# Patient Record
Sex: MALE | Race: WHITE | NOT HISPANIC OR LATINO | Employment: FULL TIME | URBAN - METROPOLITAN AREA
[De-identification: names, ages, dates, MRNs, and addresses within clinical notes are randomized per-mention and may not be internally consistent; named-entity substitution may affect disease eponyms.]

---

## 2020-10-16 ENCOUNTER — TELEPHONE (OUTPATIENT)
Dept: SCHEDULING | Facility: IMAGING CENTER | Age: 40
End: 2020-10-16

## 2020-11-09 ENCOUNTER — TELEPHONE (OUTPATIENT)
Dept: SCHEDULING | Facility: IMAGING CENTER | Age: 40
End: 2020-11-09

## 2020-11-23 ENCOUNTER — OFFICE VISIT (OUTPATIENT)
Dept: MEDICAL GROUP | Facility: LAB | Age: 40
End: 2020-11-23
Payer: COMMERCIAL

## 2020-11-23 VITALS
TEMPERATURE: 98.2 F | DIASTOLIC BLOOD PRESSURE: 82 MMHG | SYSTOLIC BLOOD PRESSURE: 122 MMHG | RESPIRATION RATE: 14 BRPM | HEART RATE: 97 BPM | WEIGHT: 146 LBS | OXYGEN SATURATION: 97 % | BODY MASS INDEX: 21.62 KG/M2 | HEIGHT: 69 IN

## 2020-11-23 DIAGNOSIS — H00.012 HORDEOLUM EXTERNUM OF RIGHT LOWER EYELID: ICD-10-CM

## 2020-11-23 DIAGNOSIS — Z76.89 ENCOUNTER TO ESTABLISH CARE: ICD-10-CM

## 2020-11-23 DIAGNOSIS — Z00.00 GENERAL MEDICAL EXAM: ICD-10-CM

## 2020-11-23 DIAGNOSIS — M25.512 CHRONIC LEFT SHOULDER PAIN: ICD-10-CM

## 2020-11-23 DIAGNOSIS — G89.29 CHRONIC LEFT SHOULDER PAIN: ICD-10-CM

## 2020-11-23 DIAGNOSIS — L98.9 SKIN LESION: ICD-10-CM

## 2020-11-23 PROCEDURE — 99203 OFFICE O/P NEW LOW 30 MIN: CPT | Performed by: PHYSICIAN ASSISTANT

## 2020-11-23 RX ORDER — CLINDAMYCIN PHOSPHATE 11.9 MG/ML
SOLUTION TOPICAL
Qty: 30 ML | Refills: 0 | Status: SHIPPED | OUTPATIENT
Start: 2020-11-23 | End: 2021-12-15

## 2020-11-23 SDOH — HEALTH STABILITY: MENTAL HEALTH: HOW MANY STANDARD DRINKS CONTAINING ALCOHOL DO YOU HAVE ON A TYPICAL DAY?: 1 OR 2

## 2020-11-23 SDOH — HEALTH STABILITY: MENTAL HEALTH: HOW OFTEN DO YOU HAVE 6 OR MORE DRINKS ON ONE OCCASION?: MONTHLY

## 2020-11-23 SDOH — HEALTH STABILITY: MENTAL HEALTH: HOW OFTEN DO YOU HAVE A DRINK CONTAINING ALCOHOL?: 4 OR MORE TIMES A WEEK

## 2020-11-23 ASSESSMENT — ENCOUNTER SYMPTOMS
CHILLS: 0
FALLS: 0
RESPIRATORY NEGATIVE: 1
GASTROINTESTINAL NEGATIVE: 1
EYES NEGATIVE: 1
WEIGHT LOSS: 0
CARDIOVASCULAR NEGATIVE: 1
BACK PAIN: 0
MYALGIAS: 1
PSYCHIATRIC NEGATIVE: 1
NECK PAIN: 0
DIAPHORESIS: 0
SORE THROAT: 0
FEVER: 0
NEUROLOGICAL NEGATIVE: 1

## 2020-11-23 ASSESSMENT — PATIENT HEALTH QUESTIONNAIRE - PHQ9: CLINICAL INTERPRETATION OF PHQ2 SCORE: 0

## 2020-11-23 NOTE — ASSESSMENT & PLAN NOTE
New to me; present for several months.   Good ROM  Feels more muscular in nature rather than stemming from joint.   No known injury or trauma.

## 2020-11-23 NOTE — PROGRESS NOTES
Raj Sharpe is a 40 y.o. male new patient here for managed of stye, shoulder pain and skin lesions.     HPI:    Hordeolum externum of right lower eyelid  New to me; stye on right lower eyelid x10 months.   Hard to the touch, denies pain.   Denies eyelid swelling.   No facial pain  No fever/chills.     Tried warm compresses early on.     Chronic left shoulder pain  New to me; present for several months.   Good ROM  Feels more muscular in nature rather than stemming from joint.   No known injury or trauma.       Skin lesion  New to me; new skin lesions x1 mo.   States that they are painless, small red raised bumps on anterior pelvic region.   His girlfriend denies any pelvic lesions as well.   No history of herpes.     Current medicines (including changes today)  Current Outpatient Medications   Medication Sig Dispense Refill   • clindamycin (CLEOCIN) 1 % Solution Apply to affected area twice per day for 1 week. 30 mL 0     No current facility-administered medications for this visit.      He  has a past medical history of No pertinent past medical history.  He  has no past surgical history on file.  Social History     Tobacco Use   • Smoking status: Current Every Day Smoker     Packs/day: 1.00     Years: 15.00     Pack years: 15.00   • Smokeless tobacco: Never Used   Substance Use Topics   • Alcohol use: Yes     Alcohol/week: 1.2 oz     Types: 2 Cans of beer per week     Frequency: 4 or more times a week     Drinks per session: 1 or 2     Binge frequency: Monthly   • Drug use: Never     Social History     Social History Narrative    Working for [employer] and [employer].     Lives alone, though girlfriend comes over often - dating for about 1.5 years.     1 cat at home.          Family History   Problem Relation Age of Onset   • No Known Problems Mother    • Hyperlipidemia Father    • No Known Problems Sister      Family Status   Relation Name Status   • Mo  Alive   • Fa  Alive   • Sis  Alive  "        ROS  Review of Systems   Constitutional: Negative for chills, diaphoresis, fever, malaise/fatigue and weight loss.   HENT: Negative for congestion, ear pain and sore throat.    Eyes: Negative.    Respiratory: Negative.    Cardiovascular: Negative.    Gastrointestinal: Negative.    Genitourinary: Negative.    Musculoskeletal: Positive for myalgias. Negative for back pain, falls, joint pain and neck pain.   Skin: Positive for itching and rash.   Neurological: Negative.    Endo/Heme/Allergies: Negative for environmental allergies.   Psychiatric/Behavioral: Negative.        All other systems reviewed and are negative     Objective:     /82 (BP Location: Left arm, Patient Position: Sitting, BP Cuff Size: Adult)   Pulse 97   Temp 36.8 °C (98.2 °F) (Temporal)   Resp 14   Ht 1.753 m (5' 9\")   Wt 66.2 kg (146 lb)   SpO2 97%  Body mass index is 21.56 kg/m².  Physical Exam:    Constitutional: Alert, no distress.  Skin: Warm, dry, good turgor, two small raised red bumps on anterior pelvic region, one slightly larger lesion distal to the aforementioned lesions.   Eye: Equal, round and reactive, conjunctiva clear, small erythematous swelling of right lower eye lid, non tender to the touch.   Neck: Trachea midline, no masses, no thyromegaly. No cervical or supraclavicular lymphadenopathy.  Respiratory: Unlabored respiratory effort, lungs clear to auscultation, no wheezes, no ronchi.  Cardiovascular: Normal S1, S2, no murmur, no edema.  MSK: L SHOULDER: No step offs or deformity. Mild TTP along medial scapular border. FROM without subjective pain.   Psych: Alert and oriented x3, normal affect and mood.        Assessment and Plan:   The following treatment plan was discussed    1. Encounter to establish care  New patient today.     2. Hordeolum externum of right lower eyelid  Since this has been present x10 months likely needs to be drained.   - REFERRAL TO OPHTHALMOLOGY    3. Skin lesion  Consider folliculitis " vs. molluscum contagiosum; will start with ABX solution, consider freezing lesions if no improvement with topical preparation.   Pt was educated on use and SEs of medication.  Continue to monitor, f/u in 2 weeks.   - clindamycin (CLEOCIN) 1 % Solution; Apply to affected area twice per day for 1 week.  Dispense: 30 mL; Refill: 0    4. 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   Will send to PT - continue to monitor  OK to use IBU/Tylenol prn pain.   - REFERRAL TO PHYSICAL THERAPY    5. General medical exam  - CBC WITH DIFFERENTIAL; Future  - Comp Metabolic Panel; Future  - Lipid Profile; Future  - HEMOGLOBIN A1C; Future  - TSH WITH REFLEX TO FT4; Future  - VITAMIN D,25 HYDROXY; Future      Records requested.  Followup: Return in about 2 weeks (around 12/7/2020).       Neha Ashford P.A.-C.  Supervising MD: Dr. Saundra Toribio MD  11/23/20

## 2020-11-23 NOTE — ASSESSMENT & PLAN NOTE
New to me; new skin lesions x1 mo.   States that they are painless, small red raised bumps on anterior pelvic region.   His girlfriend denies any pelvic lesions as well.   No history of herpes.

## 2020-11-23 NOTE — ASSESSMENT & PLAN NOTE
New to me; stye on right lower eyelid x10 months.   Hard to the touch, denies pain.   Denies eyelid swelling.   No facial pain  No fever/chills.     Tried warm compresses early on.

## 2021-02-23 ENCOUNTER — OFFICE VISIT (OUTPATIENT)
Dept: MEDICAL GROUP | Facility: LAB | Age: 41
End: 2021-02-23
Payer: COMMERCIAL

## 2021-02-23 VITALS
OXYGEN SATURATION: 95 % | TEMPERATURE: 97.5 F | HEIGHT: 69 IN | HEART RATE: 120 BPM | WEIGHT: 144.18 LBS | BODY MASS INDEX: 21.36 KG/M2 | DIASTOLIC BLOOD PRESSURE: 66 MMHG | SYSTOLIC BLOOD PRESSURE: 142 MMHG

## 2021-02-23 DIAGNOSIS — K92.1: ICD-10-CM

## 2021-02-23 DIAGNOSIS — R21 RASH AND NONSPECIFIC SKIN ERUPTION: ICD-10-CM

## 2021-02-23 PROCEDURE — 99214 OFFICE O/P EST MOD 30 MIN: CPT | Performed by: FAMILY MEDICINE

## 2021-02-23 RX ORDER — ACYCLOVIR 400 MG/1
400 TABLET ORAL 2 TIMES DAILY
Qty: 14 TABLET | Refills: 0 | Status: SHIPPED | OUTPATIENT
Start: 2021-02-23 | End: 2021-12-15

## 2021-02-23 ASSESSMENT — PATIENT HEALTH QUESTIONNAIRE - PHQ9: CLINICAL INTERPRETATION OF PHQ2 SCORE: 0

## 2021-02-24 NOTE — PROGRESS NOTES
Chief Complaint:   Chief Complaint   Patient presents with   • Rash     on sides        HPI: Established patient, new to me, accompanied with his female partner  Raj Sharpe is a 40 y.o. male who presents for concerns about rash and blood in stool, discussed the following today:      1. Rash and nonspecific skin eruption  Reports having this rash for almost 2 months now, he described it as rash in his back abdominal area, his thighs and chest area, started as itchy rash with red patches all over the described areas. Denies changes in routine or eating new type of food that might be causing him allergy. Denies burning sensation or pain in the rash area no previous history of similar rash    2. Blood in stool, luz  Patient reports that occasionally he sees fresh blood in his stool, denies pain with defecation, denies abnormal bowel movement. Denies any swelling in the anal area that might suggest hemorrhoids patient denies dizziness or abdominal pain or epigastric pain or vomiting blood. Denies chronic use of NSAIDs or any other type of medications like aspirin        Past medical history, family history, social history and medications reviewed and updated in the record. Today  Current medications, problem list and allergies reviewed in [facility] today  Health maintenance topics are reviewed and updated.    Patient Active Problem List    Diagnosis Date Noted   • Hordeolum externum of right lower eyelid 11/23/2020   • Chronic left shoulder pain 11/23/2020   • Skin lesion 11/23/2020     Family History   Problem Relation Age of Onset   • No Known Problems Mother    • Hyperlipidemia Father    • No Known Problems Sister      Social History     Socioeconomic History   • Marital status:      Spouse name: Not on file   • Number of children: Not on file   • Years of education: Not on file   • Highest education level: GED or equivalent   Occupational History   • Not on file   Tobacco Use   • Smoking status: Current Every  Day Smoker     Packs/day: 1.00     Years: 15.00     Pack years: 15.00   • Smokeless tobacco: Never Used   Substance and Sexual Activity   • Alcohol use: Yes     Alcohol/week: 1.2 oz     Types: 2 Cans of beer per week   • Drug use: Never   • Sexual activity: Yes     Partners: Female     Birth control/protection: None   Other Topics Concern   • Not on file   Social History Narrative    Working for [employer] and [employer]     Lives alone, though girlfriend comes over often - dating for about 1.5 years.     1 cat at home.          Social Determinants of Health     Financial Resource Strain: Low Risk    • Difficulty of Paying Living Expenses: Not hard at all   Food Insecurity: No Food Insecurity   • Worried About Running Out of Food in the Last Year: Never true   • Ran Out of Food in the Last Year: Never true   Transportation Needs: No Transportation Needs   • Lack of Transportation (Medical): No   • Lack of Transportation (Non-Medical): No   Physical Activity: Sufficiently Active   • Days of Exercise per Week: 7 days   • Minutes of Exercise per Session: 150+ min   Stress: Stress Concern Present   • Feeling of Stress : Very much   Social Connections: Severely Isolated   • Frequency of Communication with Friends and Family: Once a week   • Frequency of Social Gatherings with Friends and Family: Once a week   • Attends [employer] Services: Never   • Active Member of Clubs or Organizations: No   • Attends Club or Organization Meetings: Never   • Marital Status:    Intimate Partner Violence:    • Fear of Current or Ex-Partner:    • Emotionally Abused:    • Physically Abused:    • Sexually Abused:        Current Outpatient Medications   Medication Sig Dispense Refill   • hydrocortisone 2.5 % Cream topical cream Apply 1 Application topically 2 times a day. 28 g 0   • acyclovir (ZOVIRAX) 400 MG tablet Take 1 tablet by mouth 2 times a day. 14 tablet 0   • clindamycin (CLEOCIN) 1 % Solution Apply to affected area  "twice per day for 1 week. 30 mL 0     No current facility-administered medications for this visit.         Review Of Systems  As documented in HPI above  PHYSICAL EXAMINATION:    /66 (BP Location: Right arm, Patient Position: Sitting, BP Cuff Size: Adult)   Pulse (!) 120   Temp 36.4 °C (97.5 °F) (Temporal)   Ht 1.753 m (5' 9\")   Wt 65.4 kg (144 lb 2.9 oz)   SpO2 95%   BMI 21.29 kg/m²   Gen.: Well-developed, well-nourished, no apparent distress, pleasant and cooperative with the examination  HEENT: Normocephalic/atraumatic, sinuses nontender with palpation, TMs clear, nares patent with pink mucosa and clear rhinorrhea, oropharynx clear  Neck: No JVD or bruits, no adenopathy  Cor: Regular rate and rhythm without murmur gallop or rub  Skin: Rash can be described as patchy erythematous rash distributed in his chest abdomen back area and upper extremities. Very typical for herld patches, [place] tree distribution  Abdomen: Soft nontender without hepatosplenomegaly or masses appreciated, normoactive bowel sounds  Extremities: No cyanosis, clubbing or edema          ASSESSMENT/Plan:  1. Rash and nonspecific skin eruption   new concern, possibly pityriasis rosea, advised to use hydrocortisone cream for itching. Discussed with the patient the problem can resolve spontaneously, patient was given information to read about the condition, use acyclovir if not resolved by the cream    hydrocortisone 2.5 % Cream topical cream    acyclovir (ZOVIRAX) 400 MG tablet   2. Blood in stool, luz   new problem, possibly internal hemorrhoids, patient said this is an on and off problem. No red flags. Advised to follow-up with gastroenterology for further evaluation  OCCULT BLOOD FECES IMMUNOASSAY    REFERRAL TO GASTROENTEROLOGY       Please note that this dictation was created using voice recognition software. I have made every reasonable attempt to correct obvious errors but there may be errors of grammar and content that I " may have overlooked prior to finalization of this note.

## 2021-11-09 ENCOUNTER — HOSPITAL ENCOUNTER (EMERGENCY)
Facility: MEDICAL CENTER | Age: 41
End: 2021-11-09
Attending: EMERGENCY MEDICINE
Payer: COMMERCIAL

## 2021-11-09 VITALS
DIASTOLIC BLOOD PRESSURE: 76 MMHG | BODY MASS INDEX: 22.86 KG/M2 | WEIGHT: 154.32 LBS | HEART RATE: 75 BPM | TEMPERATURE: 98 F | OXYGEN SATURATION: 97 % | HEIGHT: 69 IN | RESPIRATION RATE: 16 BRPM | SYSTOLIC BLOOD PRESSURE: 128 MMHG

## 2021-11-09 DIAGNOSIS — R55 NEAR SYNCOPE: ICD-10-CM

## 2021-11-09 DIAGNOSIS — F10.10 ALCOHOL ABUSE: ICD-10-CM

## 2021-11-09 LAB
ALBUMIN SERPL BCP-MCNC: 4.6 G/DL (ref 3.2–4.9)
ALBUMIN/GLOB SERPL: 1.8 G/DL
ALP SERPL-CCNC: 96 U/L (ref 30–99)
ALT SERPL-CCNC: 101 U/L (ref 2–50)
ANION GAP SERPL CALC-SCNC: 19 MMOL/L (ref 7–16)
APPEARANCE UR: CLEAR
AST SERPL-CCNC: 144 U/L (ref 12–45)
BASOPHILS # BLD AUTO: 1.4 % (ref 0–1.8)
BASOPHILS # BLD: 0.09 K/UL (ref 0–0.12)
BILIRUB SERPL-MCNC: 0.3 MG/DL (ref 0.1–1.5)
BILIRUB UR QL STRIP.AUTO: NEGATIVE
BUN SERPL-MCNC: 6 MG/DL (ref 8–22)
CALCIUM SERPL-MCNC: 9.7 MG/DL (ref 8.4–10.2)
CHLORIDE SERPL-SCNC: 99 MMOL/L (ref 96–112)
CO2 SERPL-SCNC: 23 MMOL/L (ref 20–33)
COLOR UR: YELLOW
CREAT SERPL-MCNC: 0.96 MG/DL (ref 0.5–1.4)
EKG IMPRESSION: NORMAL
EOSINOPHIL # BLD AUTO: 0.16 K/UL (ref 0–0.51)
EOSINOPHIL NFR BLD: 2.5 % (ref 0–6.9)
ERYTHROCYTE [DISTWIDTH] IN BLOOD BY AUTOMATED COUNT: 46 FL (ref 35.9–50)
GLOBULIN SER CALC-MCNC: 2.6 G/DL (ref 1.9–3.5)
GLUCOSE SERPL-MCNC: 102 MG/DL (ref 65–99)
GLUCOSE UR STRIP.AUTO-MCNC: NEGATIVE MG/DL
HCT VFR BLD AUTO: 41.7 % (ref 42–52)
HGB BLD-MCNC: 14.2 G/DL (ref 14–18)
IMM GRANULOCYTES # BLD AUTO: 0.02 K/UL (ref 0–0.11)
IMM GRANULOCYTES NFR BLD AUTO: 0.3 % (ref 0–0.9)
KETONES UR STRIP.AUTO-MCNC: NEGATIVE MG/DL
LEUKOCYTE ESTERASE UR QL STRIP.AUTO: NEGATIVE
LIPASE SERPL-CCNC: 64 U/L (ref 7–58)
LYMPHOCYTES # BLD AUTO: 2.8 K/UL (ref 1–4.8)
LYMPHOCYTES NFR BLD: 44.2 % (ref 22–41)
MCH RBC QN AUTO: 33.1 PG (ref 27–33)
MCHC RBC AUTO-ENTMCNC: 34.1 G/DL (ref 33.7–35.3)
MCV RBC AUTO: 97.2 FL (ref 81.4–97.8)
MICRO URNS: NORMAL
MONOCYTES # BLD AUTO: 0.76 K/UL (ref 0–0.85)
MONOCYTES NFR BLD AUTO: 12 % (ref 0–13.4)
NEUTROPHILS # BLD AUTO: 2.51 K/UL (ref 1.82–7.42)
NEUTROPHILS NFR BLD: 39.6 % (ref 44–72)
NITRITE UR QL STRIP.AUTO: NEGATIVE
NRBC # BLD AUTO: 0 K/UL
NRBC BLD-RTO: 0 /100 WBC
PH UR STRIP.AUTO: 7 [PH] (ref 5–8)
PLATELET # BLD AUTO: 177 K/UL (ref 164–446)
PMV BLD AUTO: 9.6 FL (ref 9–12.9)
POTASSIUM SERPL-SCNC: 3.7 MMOL/L (ref 3.6–5.5)
PROT SERPL-MCNC: 7.2 G/DL (ref 6–8.2)
PROT UR QL STRIP: NEGATIVE MG/DL
RBC # BLD AUTO: 4.29 M/UL (ref 4.7–6.1)
RBC UR QL AUTO: NEGATIVE
SODIUM SERPL-SCNC: 141 MMOL/L (ref 135–145)
SP GR UR STRIP.AUTO: <=1.005
WBC # BLD AUTO: 6.3 K/UL (ref 4.8–10.8)

## 2021-11-09 PROCEDURE — 700111 HCHG RX REV CODE 636 W/ 250 OVERRIDE (IP): Performed by: EMERGENCY MEDICINE

## 2021-11-09 PROCEDURE — 85025 COMPLETE CBC W/AUTO DIFF WBC: CPT

## 2021-11-09 PROCEDURE — 80053 COMPREHEN METABOLIC PANEL: CPT

## 2021-11-09 PROCEDURE — 96375 TX/PRO/DX INJ NEW DRUG ADDON: CPT

## 2021-11-09 PROCEDURE — 99284 EMERGENCY DEPT VISIT MOD MDM: CPT

## 2021-11-09 PROCEDURE — 93005 ELECTROCARDIOGRAM TRACING: CPT | Performed by: EMERGENCY MEDICINE

## 2021-11-09 PROCEDURE — 96365 THER/PROPH/DIAG IV INF INIT: CPT

## 2021-11-09 PROCEDURE — 83690 ASSAY OF LIPASE: CPT

## 2021-11-09 PROCEDURE — 700101 HCHG RX REV CODE 250: Performed by: EMERGENCY MEDICINE

## 2021-11-09 PROCEDURE — 81003 URINALYSIS AUTO W/O SCOPE: CPT

## 2021-11-09 RX ORDER — LORAZEPAM 1 MG/1
1 TABLET ORAL EVERY 8 HOURS PRN
Qty: 9 TABLET | Refills: 0 | Status: SHIPPED | OUTPATIENT
Start: 2021-11-09 | End: 2021-11-12

## 2021-11-09 RX ORDER — LORAZEPAM 2 MG/ML
1 INJECTION INTRAMUSCULAR ONCE
Status: COMPLETED | OUTPATIENT
Start: 2021-11-09 | End: 2021-11-09

## 2021-11-09 RX ADMIN — LORAZEPAM 1 MG: 2 INJECTION INTRAMUSCULAR; INTRAVENOUS at 17:36

## 2021-11-09 RX ADMIN — THIAMINE HYDROCHLORIDE: 100 INJECTION, SOLUTION INTRAMUSCULAR; INTRAVENOUS at 17:36

## 2021-11-09 NOTE — ED TRIAGE NOTES
Pt comes in with mother  Pt c/o passing out while at work today  denies injury  Pt states he has a very bad alcohol issues and drinks quiet a bit vodka daily  Has had issues with withdrawals and feels he wants help with this issue   Pt calm, cooperative and A, A and O x 3  In NAD at present

## 2021-11-10 NOTE — ED NOTES
Discharge instructions given and discussed. RX for ativan  given and pt educated. Pt educated to come back to ER for new or worsening symptoms and follow up with PCP as instructed. Pt verbalized understanding. VSS. Pt  Discharged in stable condition.

## 2021-11-10 NOTE — ED NOTES
Pt alert and oriented, tremors noted. Pt states last drink was about 6 hours ago, daily vodka drinker. Pt reports to have experienced withdrawal symptoms in the past, worse on third to fourth day without ETOH.

## 2021-12-15 ENCOUNTER — OFFICE VISIT (OUTPATIENT)
Dept: MEDICAL GROUP | Facility: OTHER | Age: 41
End: 2021-12-15
Payer: COMMERCIAL

## 2021-12-15 VITALS
WEIGHT: 158 LBS | HEIGHT: 69 IN | HEART RATE: 78 BPM | RESPIRATION RATE: 14 BRPM | OXYGEN SATURATION: 97 % | BODY MASS INDEX: 23.4 KG/M2 | TEMPERATURE: 97.8 F | DIASTOLIC BLOOD PRESSURE: 80 MMHG | SYSTOLIC BLOOD PRESSURE: 130 MMHG

## 2021-12-15 DIAGNOSIS — F10.21 ALCOHOL DEPENDENCE IN REMISSION (HCC): ICD-10-CM

## 2021-12-15 DIAGNOSIS — Z00.00 GENERAL MEDICAL EXAM: ICD-10-CM

## 2021-12-15 DIAGNOSIS — R79.89 LFTS ABNORMAL: ICD-10-CM

## 2021-12-15 PROCEDURE — 99213 OFFICE O/P EST LOW 20 MIN: CPT | Performed by: FAMILY MEDICINE

## 2021-12-15 RX ORDER — ONDANSETRON 4 MG/1
TABLET, FILM COATED ORAL
COMMUNITY
Start: 2021-11-17 | End: 2021-12-15

## 2021-12-15 RX ORDER — DIAZEPAM 5 MG/1
TABLET ORAL
COMMUNITY
Start: 2021-11-17 | End: 2021-12-15

## 2021-12-15 RX ORDER — TRAZODONE HYDROCHLORIDE 50 MG/1
TABLET ORAL
COMMUNITY
Start: 2021-11-17 | End: 2021-12-15

## 2021-12-15 RX ORDER — HYDROXYZINE PAMOATE 50 MG/1
CAPSULE ORAL
COMMUNITY
Start: 2021-11-17 | End: 2021-12-15

## 2021-12-15 ASSESSMENT — ENCOUNTER SYMPTOMS
GASTROINTESTINAL NEGATIVE: 1
PSYCHIATRIC NEGATIVE: 1
CONSTITUTIONAL NEGATIVE: 1

## 2021-12-15 ASSESSMENT — FIBROSIS 4 INDEX: FIB4 SCORE: 3.24

## 2021-12-15 NOTE — PROGRESS NOTES
" Subjective:   Raj Sharpe is a 41 y.o. male here for the evaluation and management of Establish Care (Former[facility] FORMS )    History of alcohol dependency-he reports alcohol use gradually increased until Nov 4 when he had an episode at work and ultimately went to the hospital for further evaluation. He subsequently sought inpatient rehab for 1 week between Nov 15 and 21. Currently reports he is doing well and has been maintaining his sobriety. He is self managing. This is confirmed by his partner Sarah.    Elevated liver function testing on hospital evaluation with recommendation to repeat  No problems updated.    Review of Systems   Constitutional: Negative.    Gastrointestinal: Negative.    Psychiatric/Behavioral: Negative.        Current Outpatient Medications   Medication Sig Dispense Refill   • hydrocortisone 2.5 % Cream topical cream Apply 1 Application topically 2 times a day. (Patient not taking: Reported on 11/9/2021) 28 g 0   • acyclovir (ZOVIRAX) 400 MG tablet Take 1 tablet by mouth 2 times a day. (Patient not taking: Reported on 11/9/2021) 14 tablet 0   • clindamycin (CLEOCIN) 1 % Solution Apply to affected area twice per day for 1 week. (Patient not taking: Reported on 11/9/2021) 30 mL 0     No current facility-administered medications for this visit.     Allergies  Patient has no known allergies.    Past Medical History:   Diagnosis Date   • ETOH abuse    • No pertinent past medical history      Patient Active Problem List    Diagnosis Date Noted   • Hordeolum externum of right lower eyelid 11/23/2020   • Chronic left shoulder pain 11/23/2020   • Skin lesion 11/23/2020       Past Surgical History  History reviewed. No pertinent surgical history.     Objective:     Vitals:    12/15/21 0917   BP: 130/80   BP Location: Right arm   Patient Position: Sitting   BP Cuff Size: Adult   Pulse: 78   Resp: 14   Temp: 36.6 °C (97.8 °F)   TempSrc: Temporal   SpO2: 97%   Weight: 71.7 kg (158 lb)   Height: 1.753 m (5' 9\") "     Body mass index is 23.33 kg/m².     Physical Exam  Constitutional:       Appearance: Normal appearance.   HENT:      Head: Normocephalic and atraumatic.   Eyes:      Extraocular Movements: Extraocular movements intact.      Conjunctiva/sclera: Conjunctivae normal.   Cardiovascular:      Rate and Rhythm: Normal rate and regular rhythm.      Heart sounds: Normal heart sounds.   Pulmonary:      Effort: Pulmonary effort is normal.      Breath sounds: Normal breath sounds.   Abdominal:      General: Abdomen is flat. Bowel sounds are normal.      Palpations: Abdomen is soft. There is no mass.   Skin:     General: Skin is warm and dry.   Neurological:      General: No focal deficit present.      Mental Status: He is alert and oriented to person, place, and time. Mental status is at baseline.   Psychiatric:         Mood and Affect: Mood normal.         Behavior: Behavior normal.         Assessment and Plan:   Raj Sharpe is a 41 y.o. male with a Establish Care ([facility] FORMS )     The following was discussed with the patient today.    Problem List Items Addressed This Visit     None        Reviewed and discussed previous emergency evaluation on Nov 9, reviewed and discussed laboratory studies including elevated LFTs, recommended maintaining sobriety and discussed treatment options with patient electing ongoing self-management. We discussed his mental health and he reports there is no significant anxiety. Recommend we watch this and with close follow-up in 1 month. [facility] paperwork was completed for his employer and he is cleared to return to full duty work.  Followup: No follow-ups on file.    Jhony Ferrell M.D.    Please note that this dictation was created using voice recognition software. I have made every reasonable attempt to correct obvious errors, but I expect that there are errors of grammar and possibly content that I did not discover before finalizing the note.

## 2021-12-15 NOTE — LETTER
12/15/2021    Patient: Raj Sharpe  : 1980  Provider: Jhony Ferrell M.D.    RETURN TO WORK    CURRENT RESTRICTIONS: full duty with no restrictions        RETURN VISIT: Return in about 4 weeks (around 2022).    Electronically Signed: Jhony Ferrell M.D.

## 2022-01-06 ENCOUNTER — OFFICE VISIT (OUTPATIENT)
Dept: MEDICAL GROUP | Facility: OTHER | Age: 42
End: 2022-01-06
Payer: COMMERCIAL

## 2022-01-06 VITALS
RESPIRATION RATE: 16 BRPM | SYSTOLIC BLOOD PRESSURE: 115 MMHG | DIASTOLIC BLOOD PRESSURE: 82 MMHG | TEMPERATURE: 97.7 F | HEART RATE: 96 BPM

## 2022-01-06 DIAGNOSIS — F10.982 INSOMNIA DUE TO ALCOHOL (HCC): ICD-10-CM

## 2022-01-06 DIAGNOSIS — H00.012 HORDEOLUM EXTERNUM OF RIGHT LOWER EYELID: ICD-10-CM

## 2022-01-06 DIAGNOSIS — F10.230 ALCOHOL DEPENDENCE WITH UNCOMPLICATED WITHDRAWAL (HCC): ICD-10-CM

## 2022-01-06 DIAGNOSIS — R11.0 NAUSEA: ICD-10-CM

## 2022-01-06 PROCEDURE — 99213 OFFICE O/P EST LOW 20 MIN: CPT | Performed by: FAMILY MEDICINE

## 2022-01-06 RX ORDER — HYDROXYZINE HYDROCHLORIDE 25 MG/1
25 TABLET, FILM COATED ORAL EVERY 6 HOURS PRN
Qty: 60 TABLET | Refills: 0 | Status: SHIPPED | OUTPATIENT
Start: 2022-01-06 | End: 2023-11-22

## 2022-01-06 RX ORDER — ONDANSETRON 4 MG/1
4 TABLET, FILM COATED ORAL EVERY 6 HOURS PRN
Qty: 30 TABLET | Refills: 1 | Status: SHIPPED | OUTPATIENT
Start: 2022-01-06 | End: 2023-11-22

## 2022-01-06 RX ORDER — TRAZODONE HYDROCHLORIDE 50 MG/1
50 TABLET ORAL
Qty: 30 TABLET | Refills: 3 | Status: SHIPPED | OUTPATIENT
Start: 2022-01-06 | End: 2023-11-22

## 2022-01-06 RX ORDER — DIAZEPAM 5 MG/1
5 TABLET ORAL EVERY 6 HOURS PRN
Qty: 18 TABLET | Refills: 0 | Status: SHIPPED | OUTPATIENT
Start: 2022-01-06 | End: 2022-01-12

## 2022-01-06 ASSESSMENT — ENCOUNTER SYMPTOMS: CONSTITUTIONAL NEGATIVE: 1

## 2022-01-06 NOTE — LETTER
2022    Patient: Raj Sharpe  : 1980  Provider: Jhony Ferrell M.D.    RETURN TO WORK    CURRENT RESTRICTIONS: FULL DUTY, NO RESTRICTIONS AS OF 22    RETURN VISIT: Return in about 1 week (around 2022).    Electronically Signed: Jhony Ferrell M.D.

## 2022-01-06 NOTE — PROGRESS NOTES
Subjective:   Raj Sharpe is a 41 y.o. male here for the evaluation and management of Paperwork    Alcohol overuse with associated withdrawal-unfortunately since her last visit he resumed alcohol use, he reports his use escalated and he started noticing withdrawal symptoms in the morning including nausea.  We a long discussion about treatment options with the patient and his partner Sarah.  I discussed inpatient versus outpatient treatment.  We reviewed previous treatment and medications used successfully.    Stye-resolved        No problems updated.    Review of Systems   Constitutional: Negative.        No current outpatient medications on file.     No current facility-administered medications for this visit.     Allergies  Patient has no known allergies.    Past Medical History:   Diagnosis Date   • ETOH abuse    • No pertinent past medical history      Patient Active Problem List    Diagnosis Date Noted   • LFTs abnormal 12/15/2021   • Alcohol dependence in remission (HCC) 12/15/2021   • Hordeolum externum of right lower eyelid 11/23/2020   • Chronic left shoulder pain 11/23/2020   • Skin lesion 11/23/2020       Past Surgical History  No past surgical history on file.     Objective:     Vitals:    01/06/22 1322   BP: 115/82   Pulse: 96   Resp: 16   Temp: 36.5 °C (97.7 °F)     There is no height or weight on file to calculate BMI.     Physical Exam  Constitutional:       Appearance: Normal appearance.   HENT:      Head: Normocephalic.   Eyes:      Extraocular Movements: Extraocular movements intact.      Conjunctiva/sclera: Conjunctivae normal.   Neurological:      Mental Status: He is alert. Mental status is at baseline.   Psychiatric:         Mood and Affect: Mood normal.         Behavior: Behavior normal.         Assessment and Plan:   Raj Sharpe is a 41 y.o. male with a Paperwork     The following was discussed with the patient today.    Problem List Items Addressed This Visit     Alcohol dependence in  remission (Formerly [facility])        Reviewed and discussed treatment options including inpatient versus outpatient rehabilitation and use of Valium, patient is familiar with Valium and we agreed to a strict schedule with Valium used 4 times daily for 2 days then 3 times daily for 2 days and then twice daily for 2 days until he sees me back, medications will be handled by his partner Sarah.  Other medications were prescribed to help him manage his insomnia and associated nausea.  Return to work paperwork was completed for FMLA.  Close follow-up in 1 week    Followup: Return in about 1 week (around 1/13/2022).    Jhony Ferrell M.D.    Please note that this dictation was created using voice recognition software. I have made every reasonable attempt to correct obvious errors, but I expect that there are errors of grammar and possibly content that I did not discover before finalizing the note.

## 2022-01-07 ENCOUNTER — TELEPHONE (OUTPATIENT)
Dept: MEDICAL GROUP | Facility: OTHER | Age: 42
End: 2022-01-07

## 2022-01-12 ENCOUNTER — TELEPHONE (OUTPATIENT)
Dept: MEDICAL GROUP | Facility: OTHER | Age: 42
End: 2022-01-12

## 2022-01-12 NOTE — TELEPHONE ENCOUNTER
Patient called stating that you have you only prescribed for a week on his Valium, he called requesting more.please advise.

## 2022-01-12 NOTE — TELEPHONE ENCOUNTER
Pt called he needs a refill of his valium. His pharmacy is [facility] on [address]. Any questions please call [phone].

## 2022-01-13 ENCOUNTER — OFFICE VISIT (OUTPATIENT)
Dept: MEDICAL GROUP | Facility: OTHER | Age: 42
End: 2022-01-13
Payer: COMMERCIAL

## 2022-01-13 VITALS
TEMPERATURE: 98.7 F | DIASTOLIC BLOOD PRESSURE: 70 MMHG | WEIGHT: 159.7 LBS | BODY MASS INDEX: 23.65 KG/M2 | OXYGEN SATURATION: 93 % | HEART RATE: 109 BPM | SYSTOLIC BLOOD PRESSURE: 108 MMHG | HEIGHT: 69 IN

## 2022-01-13 DIAGNOSIS — F10.982 INSOMNIA DUE TO ALCOHOL (HCC): ICD-10-CM

## 2022-01-13 DIAGNOSIS — R11.0 NAUSEA: ICD-10-CM

## 2022-01-13 DIAGNOSIS — F10.21 ALCOHOL DEPENDENCE IN REMISSION (HCC): Primary | ICD-10-CM

## 2022-01-13 DIAGNOSIS — R79.89 LFTS ABNORMAL: ICD-10-CM

## 2022-01-13 PROCEDURE — 99214 OFFICE O/P EST MOD 30 MIN: CPT | Performed by: FAMILY MEDICINE

## 2022-01-13 RX ORDER — DIAZEPAM 5 MG/1
5 TABLET ORAL EVERY 12 HOURS PRN
Qty: 10 TABLET | Refills: 0 | Status: SHIPPED | OUTPATIENT
Start: 2022-01-13 | End: 2022-01-23

## 2022-01-13 ASSESSMENT — FIBROSIS 4 INDEX: FIB4 SCORE: 3.24

## 2022-01-13 ASSESSMENT — PATIENT HEALTH QUESTIONNAIRE - PHQ9: CLINICAL INTERPRETATION OF PHQ2 SCORE: 0

## 2022-01-14 PROBLEM — L98.9 SKIN LESION: Status: RESOLVED | Noted: 2020-11-23 | Resolved: 2022-01-14

## 2022-01-14 PROBLEM — H00.012 HORDEOLUM EXTERNUM OF RIGHT LOWER EYELID: Status: RESOLVED | Noted: 2020-11-23 | Resolved: 2022-01-14

## 2022-01-14 ASSESSMENT — ENCOUNTER SYMPTOMS
CONSTITUTIONAL NEGATIVE: 1
GASTROINTESTINAL NEGATIVE: 1

## 2022-01-14 NOTE — PROGRESS NOTES
Subjective:   Raj Sharpe is a 41 y.o. male here for the evaluation and management of Follow-Up (meds)    Alcohol dependency-patient reports he used Valium as instructed and dispensed by his partner Sarah.  He reports has had no alcohol today and is doing reasonably well.  We had a long discussion about treatment options and use of medications.  He has a strong desire to return to work.  He reports the Valium was helpful but he did also drink during the previous week.  I emphasized the importance of social support and professional assistance in completely discontinuing alcohol    Nausea-no current complaints today    Abnormal liver function testing-discussed previous laboratory studies and need to avoid alcohol      Problem   Hordeolum Externum of Right Lower Eyelid (Resolved)   Skin Lesion (Resolved)       Review of Systems   Constitutional: Negative.    Gastrointestinal: Negative.        Current Outpatient Medications   Medication Sig Dispense Refill   • diazePAM (VALIUM) 5 MG Tab Take 1 Tablet by mouth every 12 hours as needed for Anxiety for up to 10 days. 10 Tablet 0   • ondansetron (ZOFRAN) 4 MG Tab tablet Take 1 Tablet by mouth every 6 hours as needed for Nausea/Vomiting. 30 Tablet 1   • traZODone (DESYREL) 50 MG Tab Take 1 Tablet by mouth 1 time a day as needed for Sleep. 30 Tablet 3   • hydrOXYzine HCl (ATARAX) 25 MG Tab Take 1 Tablet by mouth every 6 hours as needed for Anxiety. 60 Tablet 0     No current facility-administered medications for this visit.     Allergies  Patient has no known allergies.    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 No pertinent  "surgical history.     Objective:     Vitals:    01/13/22 1528   BP: 108/70   BP Location: Left arm   Patient Position: Sitting   BP Cuff Size: Adult   Pulse: (!) 109   Temp: 37.1 °C (98.7 °F)   SpO2: 93%   Weight: 72.4 kg (159 lb 11.2 oz)   Height: 1.753 m (5' 9\")     Body mass index is 23.58 kg/m².     Physical Exam  Constitutional:       Appearance: Normal appearance.   HENT:      Head: Normocephalic.   Eyes:      Extraocular Movements: Extraocular movements intact.      Conjunctiva/sclera: Conjunctivae normal.   Neurological:      Mental Status: He is alert. Mental status is at baseline.   Psychiatric:         Mood and Affect: Mood normal.         Behavior: Behavior normal.         Assessment and Plan:   Raj Sharpe is a 41 y.o. male with a Follow-Up (meds)     The following was discussed with the patient today.    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  I recommended close follow-up in 1 week.  He has been accepted into an outpatient rehabilitation program with ongoing support which I encouraged him to connect with.  Followup: No follow-ups on file.    Jhony Ferrell M.D.    Please note that this dictation was created using voice recognition software. I have made every reasonable attempt to correct obvious errors, but I expect that there are errors of grammar and possibly content that I did not discover before finalizing the note.  "

## 2022-01-21 ENCOUNTER — APPOINTMENT (OUTPATIENT)
Dept: MEDICAL GROUP | Facility: OTHER | Age: 42
End: 2022-01-21
Payer: COMMERCIAL

## 2022-02-09 ENCOUNTER — OFFICE VISIT (OUTPATIENT)
Dept: MEDICAL GROUP | Facility: OTHER | Age: 42
End: 2022-02-09
Payer: COMMERCIAL

## 2022-02-09 VITALS
HEIGHT: 69 IN | DIASTOLIC BLOOD PRESSURE: 70 MMHG | BODY MASS INDEX: 24.14 KG/M2 | WEIGHT: 163 LBS | SYSTOLIC BLOOD PRESSURE: 120 MMHG | OXYGEN SATURATION: 97 % | RESPIRATION RATE: 14 BRPM | HEART RATE: 84 BPM | TEMPERATURE: 96.8 F

## 2022-02-09 DIAGNOSIS — R79.89 LFTS ABNORMAL: ICD-10-CM

## 2022-02-09 DIAGNOSIS — R11.0 NAUSEA: ICD-10-CM

## 2022-02-09 DIAGNOSIS — F10.21 ALCOHOL DEPENDENCE IN REMISSION (HCC): ICD-10-CM

## 2022-02-09 PROCEDURE — 99214 OFFICE O/P EST MOD 30 MIN: CPT | Performed by: FAMILY MEDICINE

## 2022-02-09 RX ORDER — FOLIC ACID 1 MG/1
1 TABLET ORAL DAILY
Qty: 30 TABLET | Refills: 2 | Status: SHIPPED | OUTPATIENT
Start: 2022-02-09 | End: 2023-11-22

## 2022-02-09 RX ORDER — LANOLIN ALCOHOL/MO/W.PET/CERES
100 CREAM (GRAM) TOPICAL DAILY
Qty: 30 TABLET | Refills: 3 | Status: SHIPPED | OUTPATIENT
Start: 2022-02-09 | End: 2023-11-22

## 2022-02-09 RX ORDER — NALTREXONE HYDROCHLORIDE 50 MG/1
50 TABLET, FILM COATED ORAL DAILY
Qty: 30 TABLET | Refills: 2 | Status: SHIPPED | OUTPATIENT
Start: 2022-02-09 | End: 2023-11-08 | Stop reason: SDUPTHER

## 2022-02-09 RX ORDER — DIAZEPAM 5 MG/1
5 TABLET ORAL EVERY 6 HOURS PRN
Qty: 32 TABLET | Refills: 0 | Status: SHIPPED | OUTPATIENT
Start: 2022-02-09 | End: 2022-02-17

## 2022-02-09 ASSESSMENT — ENCOUNTER SYMPTOMS: CONSTITUTIONAL NEGATIVE: 1

## 2022-02-09 ASSESSMENT — FIBROSIS 4 INDEX: FIB4 SCORE: 3.24

## 2022-02-09 NOTE — PROGRESS NOTES
Subjective:   Raj Sharpe is a 41 y.o. male here for the evaluation and management of Follow-Up (2 month check )    Alcohol dependency-ongoing challenges with remaining sober, still has been drinking, open discussion with the patient and his partner regarding treatment options including outpatient programs.    Prior history of abnormal liver function testing most likely secondary to alcohol use    Nausea-no current GI complaints  No problems updated.    Review of Systems   Constitutional: Negative.        Current Outpatient Medications   Medication Sig Dispense Refill   • diazePAM (VALIUM) 5 MG Tab Take 1 Tablet by mouth every 6 hours as needed for Anxiety for up to 32 doses. 32 Tablet 0   • thiamine (THIAMINE) 100 MG tablet Take 1 Tablet by mouth every day. 30 Tablet 3   • folic acid (FOLVITE) 1 MG Tab Take 1 Tablet by mouth every day. 30 Tablet 2   • naltrexone (DEPADE) 50 MG Tab Take 1 Tablet by mouth every day. 30 Tablet 2   • ondansetron (ZOFRAN) 4 MG Tab tablet Take 1 Tablet by mouth every 6 hours as needed for Nausea/Vomiting. 30 Tablet 1   • traZODone (DESYREL) 50 MG Tab Take 1 Tablet by mouth 1 time a day as needed for Sleep. 30 Tablet 3   • hydrOXYzine HCl (ATARAX) 25 MG Tab Take 1 Tablet by mouth every 6 hours as needed for Anxiety. 60 Tablet 0     No current facility-administered medications for this visit.     Allergies  Patient has no known allergies.    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 No pertinent surgical history.     Objective:     Vitals:    02/09/22 1107   BP: 120/70   BP Location: Right arm   Patient Position: Sitting   BP Cuff Size: Adult  "  Pulse: 84   Resp: 14   Temp: 36 °C (96.8 °F)   TempSrc: Temporal   SpO2: 97%   Weight: 73.9 kg (163 lb)   Height: 1.753 m (5' 9\")     Body mass index is 24.07 kg/m².     Physical Exam  Constitutional:       Appearance: Normal appearance.   HENT:      Head: Normocephalic.   Eyes:      Extraocular Movements: Extraocular movements intact.      Conjunctiva/sclera: Conjunctivae normal.   Neurological:      Mental Status: He is alert. Mental status is at baseline.   Psychiatric:         Mood and Affect: Mood normal.         Behavior: Behavior normal.         Assessment and Plan:   Raj Sharpe is a 41 y.o. male with a Follow-Up (2 month check )     The following was discussed with the patient today.    Problem List Items Addressed This Visit     Alcohol dependence in remission (HCC)    Relevant Medications    diazePAM (VALIUM) 5 MG Tab    thiamine (THIAMINE) 100 MG tablet    folic acid (FOLVITE) 1 MG Tab    naltrexone (DEPADE) 50 MG Tab        Open discussion about management of alcohol dependency, he requests another prescription of Valium to assist with reducing his alcohol consumption and avoiding withdrawals.  He is to new with this process and has been through it in formal outpatient rehabilitation and previously by myself.  His partner agreed to administer the medications in accordance with my instructions.  I discussed the role of naltrexone reducing cravings and provided vitamins to assist with decreasing alcohol consumption.  Close follow-up in 10 to 14 days.  We mutually agreed today of he is unable to maintain his sobriety I would not repeat an outpatient Valium prescription but instead would recommend a more formal treatment plan  Followup: No follow-ups on file.    Jhony Ferrell M.D.    Please note that this dictation was created using voice recognition software. I have made every reasonable attempt to correct obvious errors, but I expect that there are errors of grammar and possibly content that I did not " discover before finalizing the note.

## 2022-05-12 ENCOUNTER — HOSPITAL ENCOUNTER (EMERGENCY)
Facility: MEDICAL CENTER | Age: 42
End: 2022-05-12
Payer: COMMERCIAL

## 2022-05-12 VITALS
BODY MASS INDEX: 23.05 KG/M2 | OXYGEN SATURATION: 96 % | RESPIRATION RATE: 14 BRPM | HEIGHT: 69 IN | DIASTOLIC BLOOD PRESSURE: 86 MMHG | HEART RATE: 104 BPM | WEIGHT: 155.65 LBS | TEMPERATURE: 97.9 F | SYSTOLIC BLOOD PRESSURE: 118 MMHG

## 2022-05-12 PROCEDURE — 302449 STATCHG TRIAGE ONLY (STATISTIC)

## 2022-05-12 ASSESSMENT — FIBROSIS 4 INDEX: FIB4 SCORE: 3.24

## 2022-06-24 ENCOUNTER — HOSPITAL ENCOUNTER (EMERGENCY)
Facility: MEDICAL CENTER | Age: 42
End: 2022-06-24
Attending: EMERGENCY MEDICINE
Payer: COMMERCIAL

## 2022-06-24 VITALS
SYSTOLIC BLOOD PRESSURE: 121 MMHG | HEART RATE: 91 BPM | RESPIRATION RATE: 16 BRPM | DIASTOLIC BLOOD PRESSURE: 78 MMHG | WEIGHT: 151.9 LBS | TEMPERATURE: 98 F | BODY MASS INDEX: 22.5 KG/M2 | HEIGHT: 69 IN | OXYGEN SATURATION: 98 %

## 2022-06-24 DIAGNOSIS — F10.930 ALCOHOL WITHDRAWAL SYNDROME WITHOUT COMPLICATION (HCC): ICD-10-CM

## 2022-06-24 DIAGNOSIS — F41.9 ANXIETY: ICD-10-CM

## 2022-06-24 LAB
ALBUMIN SERPL BCP-MCNC: 4.5 G/DL (ref 3.2–4.9)
ALBUMIN/GLOB SERPL: 1.6 G/DL
ALP SERPL-CCNC: 128 U/L (ref 30–99)
ALT SERPL-CCNC: 158 U/L (ref 2–50)
ANION GAP SERPL CALC-SCNC: 20 MMOL/L (ref 7–16)
AST SERPL-CCNC: 155 U/L (ref 12–45)
BASOPHILS # BLD AUTO: 1.2 % (ref 0–1.8)
BASOPHILS # BLD: 0.06 K/UL (ref 0–0.12)
BILIRUB SERPL-MCNC: 0.4 MG/DL (ref 0.1–1.5)
BUN SERPL-MCNC: 6 MG/DL (ref 8–22)
CALCIUM SERPL-MCNC: 9 MG/DL (ref 8.4–10.2)
CHLORIDE SERPL-SCNC: 98 MMOL/L (ref 96–112)
CO2 SERPL-SCNC: 21 MMOL/L (ref 20–33)
CREAT SERPL-MCNC: 0.83 MG/DL (ref 0.5–1.4)
EOSINOPHIL # BLD AUTO: 0.07 K/UL (ref 0–0.51)
EOSINOPHIL NFR BLD: 1.4 % (ref 0–6.9)
ERYTHROCYTE [DISTWIDTH] IN BLOOD BY AUTOMATED COUNT: 46.1 FL (ref 35.9–50)
GFR SERPLBLD CREATININE-BSD FMLA CKD-EPI: 113 ML/MIN/1.73 M 2
GLOBULIN SER CALC-MCNC: 2.8 G/DL (ref 1.9–3.5)
GLUCOSE SERPL-MCNC: 101 MG/DL (ref 65–99)
HCT VFR BLD AUTO: 46.4 % (ref 42–52)
HGB BLD-MCNC: 16.1 G/DL (ref 14–18)
IMM GRANULOCYTES # BLD AUTO: 0.01 K/UL (ref 0–0.11)
IMM GRANULOCYTES NFR BLD AUTO: 0.2 % (ref 0–0.9)
LIPASE SERPL-CCNC: 33 U/L (ref 7–58)
LYMPHOCYTES # BLD AUTO: 2.56 K/UL (ref 1–4.8)
LYMPHOCYTES NFR BLD: 50.2 % (ref 22–41)
MAGNESIUM SERPL-MCNC: 2 MG/DL (ref 1.5–2.5)
MCH RBC QN AUTO: 32.4 PG (ref 27–33)
MCHC RBC AUTO-ENTMCNC: 34.7 G/DL (ref 33.7–35.3)
MCV RBC AUTO: 93.4 FL (ref 81.4–97.8)
MONOCYTES # BLD AUTO: 0.64 K/UL (ref 0–0.85)
MONOCYTES NFR BLD AUTO: 12.5 % (ref 0–13.4)
NEUTROPHILS # BLD AUTO: 1.76 K/UL (ref 1.82–7.42)
NEUTROPHILS NFR BLD: 34.5 % (ref 44–72)
NRBC # BLD AUTO: 0 K/UL
NRBC BLD-RTO: 0 /100 WBC
PHOSPHATE SERPL-MCNC: 2.4 MG/DL (ref 2.5–4.5)
PLATELET # BLD AUTO: 160 K/UL (ref 164–446)
PMV BLD AUTO: 10 FL (ref 9–12.9)
POC BREATHALIZER: 0.15 PERCENT (ref 0–0.01)
POTASSIUM SERPL-SCNC: 3.5 MMOL/L (ref 3.6–5.5)
PROT SERPL-MCNC: 7.3 G/DL (ref 6–8.2)
RBC # BLD AUTO: 4.97 M/UL (ref 4.7–6.1)
SODIUM SERPL-SCNC: 139 MMOL/L (ref 135–145)
WBC # BLD AUTO: 5.1 K/UL (ref 4.8–10.8)

## 2022-06-24 PROCEDURE — 700111 HCHG RX REV CODE 636 W/ 250 OVERRIDE (IP): Performed by: EMERGENCY MEDICINE

## 2022-06-24 PROCEDURE — 85025 COMPLETE CBC W/AUTO DIFF WBC: CPT

## 2022-06-24 PROCEDURE — 84100 ASSAY OF PHOSPHORUS: CPT

## 2022-06-24 PROCEDURE — 99285 EMERGENCY DEPT VISIT HI MDM: CPT

## 2022-06-24 PROCEDURE — 302970 POC BREATHALIZER

## 2022-06-24 PROCEDURE — 83690 ASSAY OF LIPASE: CPT

## 2022-06-24 PROCEDURE — 80053 COMPREHEN METABOLIC PANEL: CPT

## 2022-06-24 PROCEDURE — 36415 COLL VENOUS BLD VENIPUNCTURE: CPT

## 2022-06-24 PROCEDURE — 96374 THER/PROPH/DIAG INJ IV PUSH: CPT

## 2022-06-24 PROCEDURE — 83735 ASSAY OF MAGNESIUM: CPT

## 2022-06-24 PROCEDURE — 700105 HCHG RX REV CODE 258: Performed by: EMERGENCY MEDICINE

## 2022-06-24 PROCEDURE — 302970 POC BREATHALIZER: Performed by: EMERGENCY MEDICINE

## 2022-06-24 RX ORDER — LORAZEPAM 2 MG/ML
2 INJECTION INTRAMUSCULAR ONCE
Status: COMPLETED | OUTPATIENT
Start: 2022-06-24 | End: 2022-06-24

## 2022-06-24 RX ORDER — SODIUM CHLORIDE 9 MG/ML
1000 INJECTION, SOLUTION INTRAVENOUS ONCE
Status: COMPLETED | OUTPATIENT
Start: 2022-06-24 | End: 2022-06-24

## 2022-06-24 RX ORDER — LORAZEPAM 0.5 MG/1
0.5 TABLET ORAL EVERY 4 HOURS PRN
Qty: 10 TABLET | Refills: 0 | Status: SHIPPED | OUTPATIENT
Start: 2022-06-24 | End: 2022-06-27

## 2022-06-24 RX ADMIN — LORAZEPAM 2 MG: 2 INJECTION INTRAMUSCULAR; INTRAVENOUS at 20:08

## 2022-06-24 RX ADMIN — SODIUM CHLORIDE 1000 ML: 9 INJECTION, SOLUTION INTRAVENOUS at 20:08

## 2022-06-24 ASSESSMENT — FIBROSIS 4 INDEX: FIB4 SCORE: 3.32

## 2022-06-24 NOTE — Clinical Note
Raj Annemarie was seen and treated in our emergency department on 6/24/2022.  He may return to work on 06/25/2022.       If you have any questions or concerns, please don't hesitate to call.      Jaime Chapa D.O.

## 2022-06-25 NOTE — ED PROVIDER NOTES
"ED Provider Note    CHIEF COMPLAINT  Chief Complaint   Patient presents with   • ETOH Withdrawal     Pt cut back on his alcohol and wants a medication for his withdrawal s/s  N/V/D  Last drink today   • Body Aches     Dx with Covid 2 wks ago and has persistent body aches and cough Wants to be checked out   • Suicidal Ideation     Pt admits to having periodic suicidal thoughts  \" But I would never do it \" \" I have too many family members that love and count on me'  Pt tearful in triage       HPI  Raj Sharpe is a 42 y.o. male who presents to the emergency department with complaint of alcohol withdrawal.  He states that he has been drinking significantly since he was diagnosed with COVID 2 weeks ago and since then he has had persistent body aches, withdrawal-like symptoms, shakes, nausea, vomiting and diarrhea.  Last drink was earlier today in the form of vodka.  He states he does not want go to rehabilitation but he wants help with his alcohol abuse.  Denies fever, abdominal pain, chest pain, back pain.  He does feel anxious that when he goes to work he is can have withdrawal-like symptoms he is asking for help.  REVIEW OF SYSTEMS  Positives as above. Pertinent negatives include fever, IV drug use, hematochezia,, melena, hematuria, chest pain, abdominal pain, drug use  All other 10 review of systems are negative    PAST MEDICAL HISTORY  Past Medical History:   Diagnosis Date   • ETOH abuse    • No pertinent past medical history        FAMILY HISTORY  Noncontributory    SOCIAL HISTORY  Social History     Socioeconomic History   • Marital status:    • Highest education level: GED or equivalent   Tobacco Use   • Smoking status: Current Every Day Smoker     Packs/day: 1.00     Years: 15.00     Pack years: 15.00   • Smokeless tobacco: Never Used   Vaping Use   • Vaping Use: Former   • Substances: Nicotine   Substance and Sexual Activity   • Alcohol use: Yes     Alcohol/week: 1.2 oz     Types: 2 Cans of beer per " "week   • Drug use: Never   • Sexual activity: Yes     Partners: Female     Birth control/protection: None   Social History Narrative    Working for [employer].     Lives alone, though girlfriend comes over often - dating for about 1.5 years.     1 cat at home.            SURGICAL HISTORY  History reviewed. No pertinent surgical history.    CURRENT MEDICATIONS  Home Medications    **Home medications have not yet been reviewed for this encounter**         ALLERGIES  No Known Allergies    PHYSICAL EXAM  VITAL SIGNS: /78   Pulse 91   Temp 36.7 °C (98 °F) (Temporal)   Resp 16   Ht 1.753 m (5' 9\")   Wt 68.9 kg (151 lb 14.4 oz)   SpO2 98%   BMI 22.43 kg/m²      Constitutional: Well developed, Well nourished, slight acute distress, Non-toxic appearance.   Eyes: PERRLA, EOMI, Conjunctiva normal, No discharge.   Cardiovascular: Tachycardic, Normal rhythm, No murmurs, No rubs, No gallops, and intact distal pulses.   Thorax & Lungs:  No respiratory distress, no rales, no rhonchi, No wheezing, No chest wall tenderness.   Abdomen: Bowel sounds normal, Soft, No tenderness, No guarding, No rebound, No pulsatile masses.   Skin: Warm, Dry, No erythema, No rash.   Extremities: Full range of motion, no deformity, no edema.  Neurologic: Alert & oriented x 3, No focal deficits noted, acting appropriately on exam.  Psychiatric: anxious      LABORATORY/ECG  Results for orders placed or performed during the hospital encounter of 06/24/22   CBC WITH DIFFERENTIAL   Result Value Ref Range    WBC 5.1 4.8 - 10.8 K/uL    RBC 4.97 4.70 - 6.10 M/uL    Hemoglobin 16.1 14.0 - 18.0 g/dL    Hematocrit 46.4 42.0 - 52.0 %    MCV 93.4 81.4 - 97.8 fL    MCH 32.4 27.0 - 33.0 pg    MCHC 34.7 33.7 - 35.3 g/dL    RDW 46.1 35.9 - 50.0 fL    Platelet Count 160 (L) 164 - 446 K/uL    MPV 10.0 9.0 - 12.9 fL    Neutrophils-Polys 34.50 (L) 44.00 - 72.00 %    Lymphocytes 50.20 (H) 22.00 - 41.00 %    Monocytes 12.50 0.00 - 13.40 %    " Eosinophils 1.40 0.00 - 6.90 %    Basophils 1.20 0.00 - 1.80 %    Immature Granulocytes 0.20 0.00 - 0.90 %    Nucleated RBC 0.00 /100 WBC    Neutrophils (Absolute) 1.76 (L) 1.82 - 7.42 K/uL    Lymphs (Absolute) 2.56 1.00 - 4.80 K/uL    Monos (Absolute) 0.64 0.00 - 0.85 K/uL    Eos (Absolute) 0.07 0.00 - 0.51 K/uL    Baso (Absolute) 0.06 0.00 - 0.12 K/uL    Immature Granulocytes (abs) 0.01 0.00 - 0.11 K/uL    NRBC (Absolute) 0.00 K/uL   COMP METABOLIC PANEL   Result Value Ref Range    Sodium 139 135 - 145 mmol/L    Potassium 3.5 (L) 3.6 - 5.5 mmol/L    Chloride 98 96 - 112 mmol/L    Co2 21 20 - 33 mmol/L    Anion Gap 20.0 (H) 7.0 - 16.0    Glucose 101 (H) 65 - 99 mg/dL    Bun 6 (L) 8 - 22 mg/dL    Creatinine 0.83 0.50 - 1.40 mg/dL    Calcium 9.0 8.4 - 10.2 mg/dL    AST(SGOT) 155 (H) 12 - 45 U/L    ALT(SGPT) 158 (H) 2 - 50 U/L    Alkaline Phosphatase 128 (H) 30 - 99 U/L    Total Bilirubin 0.4 0.1 - 1.5 mg/dL    Albumin 4.5 3.2 - 4.9 g/dL    Total Protein 7.3 6.0 - 8.2 g/dL    Globulin 2.8 1.9 - 3.5 g/dL    A-G Ratio 1.6 g/dL   LIPASE   Result Value Ref Range    Lipase 33 7 - 58 U/L   MAGNESIUM   Result Value Ref Range    Magnesium 2.0 1.5 - 2.5 mg/dL   PHOSPHORUS   Result Value Ref Range    Phosphorus 2.4 (L) 2.5 - 4.5 mg/dL   ESTIMATED GFR   Result Value Ref Range    GFR (CKD-EPI) 113 >60 mL/min/1.73 m 2   POC BREATHALIZER   Result Value Ref Range    POC Breathalizer 0.152 (A) 0.00 - 0.01 Percent         COURSE & MEDICAL DECISION MAKING  Pertinent Labs & Imaging studies reviewed. (See chart for details)  This is a pleasant 42 year old gentleman presents with alcohol withdrawal.  Even though his alcohol is elevated at 152 the patient is having slight withdrawal symptoms.  For this reason, he received Ativan IV.  The patient does have evidence of acidosis with lactic acidosis consider this alcohol and dehydration.  Received 1 L normal saline IV fluid for this.  Patient has no significant lectrolyte abnormality here in  the emergency department.  He has no florid withdrawal symptoms.  He does not want to go to rehab currently.  I did give him a handout rehab possibilities, Ativan 0.5 mg x 10 and strict return precautions have been given.  The patient was discharged, he is clinically stable, is positive p.o. with no evidence of florid withdrawal.      FINAL IMPRESSION     1. Alcohol withdrawal syndrome without complication (HCC)    2. Anxiety        DISPOSITION:  Patient will be discharged home in stable condition.    FOLLOW UP:  [facility], Emergency Dept  [address]  [facility] [address]  [phone]    If symptoms worsen    Jhony Ferrell M.D.  [address] [facility] [address]  [phone]    Schedule an appointment as soon as possible for a visit   As needed      OUTPATIENT MEDICATIONS:  Discharge Medication List as of 6/24/2022  9:31 PM      START taking these medications    Details   LORazepam (ATIVAN) 0.5 MG Tab Take 1 Tablet by mouth every four hours as needed for Anxiety for up to 3 days., Disp-10 Tablet, R-0, Normal           Electronically signed by: Jaime Chapa D.O., 6/24/2022 7:30 PM

## 2022-06-25 NOTE — ED NOTES
"Pt roomed to 10, placed in hospital gown and belongings remove from room. Pt denies SI/HI to RN and states \"he is embarrassed about how much he drinks and reports he sometimes gets depressed because of that.\"    Pt reports drinking a liter of vodka a day, last drink was 4 hrs ago.     Pt denies seizures when withdrawing as well as hallucinations and/or tremors at this time.    "

## 2022-12-09 ENCOUNTER — OFFICE VISIT (OUTPATIENT)
Dept: MEDICAL GROUP | Facility: CLINIC | Age: 42
End: 2022-12-09
Payer: COMMERCIAL

## 2022-12-09 VITALS
HEIGHT: 69 IN | RESPIRATION RATE: 20 BRPM | SYSTOLIC BLOOD PRESSURE: 130 MMHG | WEIGHT: 153 LBS | HEART RATE: 106 BPM | OXYGEN SATURATION: 92 % | BODY MASS INDEX: 22.66 KG/M2 | DIASTOLIC BLOOD PRESSURE: 85 MMHG

## 2022-12-09 DIAGNOSIS — F10.930 ALCOHOL WITHDRAWAL SYNDROME WITHOUT COMPLICATION (HCC): ICD-10-CM

## 2022-12-09 PROCEDURE — 99213 OFFICE O/P EST LOW 20 MIN: CPT | Mod: GE | Performed by: STUDENT IN AN ORGANIZED HEALTH CARE EDUCATION/TRAINING PROGRAM

## 2022-12-09 RX ORDER — DIAZEPAM 5 MG/1
5 TABLET ORAL EVERY 6 HOURS PRN
Qty: 32 TABLET | Refills: 0 | Status: SHIPPED | OUTPATIENT
Start: 2022-12-09 | End: 2022-12-19

## 2022-12-09 ASSESSMENT — FIBROSIS 4 INDEX: FIB4 SCORE: 3.16

## 2022-12-09 NOTE — PROGRESS NOTES
"Subjective:     CC: Alcoholism    HPI:   Raj presents today with his partner to discuss:    Problem   Alcohol Withdrawal Syndrome Without Complication (Hcc)    Here with his partner to discuss his alcohol dependence.  Patient states that he has been to [facility] rehab [place] 3 times, and most recently was discharged right before the scheduling of this year.  Patient has had nothing but positive experiences there.  He remained sober until a recent trigger by his parents health deteriorating.  He is currently drinking around-the-clock every 3 hours to prevent withdrawal.  Patient states he has never had a seizure from withdrawing.  Patient is riddled with shame and knows that he needs to go back to [facility].  His family and partner are not supportive of this.  Patient states that he is suffered from alcoholism for several years, cannot recall the exact start date.  He states that his parents also struggled with alcoholism.         Current Outpatient Medications Ordered in Epic   Medication Sig Dispense Refill    diazePAM (VALIUM) 5 MG Tab Take 1 Tablet by mouth every 6 hours as needed for Anxiety for up to 10 days. 32 Tablet 0    thiamine (THIAMINE) 100 MG tablet Take 1 Tablet by mouth every day. 30 Tablet 3    folic acid (FOLVITE) 1 MG Tab Take 1 Tablet by mouth every day. 30 Tablet 2    naltrexone (DEPADE) 50 MG Tab Take 1 Tablet by mouth every day. 30 Tablet 2    ondansetron (ZOFRAN) 4 MG Tab tablet Take 1 Tablet by mouth every 6 hours as needed for Nausea/Vomiting. 30 Tablet 1    traZODone (DESYREL) 50 MG Tab Take 1 Tablet by mouth 1 time a day as needed for Sleep. 30 Tablet 3    hydrOXYzine HCl (ATARAX) 25 MG Tab Take 1 Tablet by mouth every 6 hours as needed for Anxiety. 60 Tablet 0     No current Epic-ordered facility-administered medications on file.         Objective:     Exam:  /85   Pulse (!) 106   Resp 20   Ht 1.753 m (5' 9\")   Wt 69.4 kg (153 lb)   SpO2 92%   BMI 22.59 kg/m²  Body mass " index is 22.59 kg/m².    General: Normal appearing. No distress.  Neck: Supple without JVD or bruit. Thyroid is not enlarged.  Pulmonary: Clear to ausculation.  Normal effort. No rales, ronchi, or wheezing.  Cardiovascular: Tachycardic, regular rhythm without murmur.   Lymph: No cervical or supraclavicular lymph nodes are palpable  Skin: Warm and dry.  No obvious lesions.  Musculoskeletal: Normal gait. No extremity cyanosis, clubbing, or edema.  Psych: Anxious mood. Tearful at times.      Assessment & Plan:     42 y.o. male with the following -     Problem List Items Addressed This Visit       Alcohol withdrawal syndrome without complication (HCC)     Patient currently intoxicated, but mild-mannered, emotionally labile, in the context of several years of alcoholism. Patient has excellent insight into his addiction. Patient has been to rehabilitation 3 separate times, most recently last month.  He appears ready to return to rehab today, with the support of his partner who is present today. Congratulated patient on his courage to return. We will provide a prescription for Valium in the event that plans to go to rehabilitation today did not come to fruition and he needs support with the detox process.  Follow-up with me in 6 weeks at which point we can discuss how to maintain sobriety and also evaluate his metabolic panel as his LFTs were grossly abnormal in June of this year.         Relevant Medications    diazePAM (VALIUM) 5 MG Tab         No follow-ups on file.    Laura Adam MD   PGY-3

## 2022-12-09 NOTE — ASSESSMENT & PLAN NOTE
Patient currently intoxicated, but mild-mannered, emotionally labile, in the context of several years of alcoholism. Patient has excellent insight into his addiction. Patient has been to rehabilitation 3 separate times, most recently last month.  He appears ready to return to rehab today, with the support of his partner who is present today. Congratulated patient on his courage to return. We will provide a prescription for Valium in the event that plans to go to rehabilitation today did not come to fruition and he needs support with the detox process.  Follow-up with me in 6 weeks at which point we can discuss how to maintain sobriety and also evaluate his metabolic panel as his LFTs were grossly abnormal in June of this year.

## 2023-01-17 ENCOUNTER — APPOINTMENT (OUTPATIENT)
Dept: MEDICAL GROUP | Facility: CLINIC | Age: 43
End: 2023-01-17
Payer: COMMERCIAL

## 2023-11-07 SDOH — ECONOMIC STABILITY: HOUSING INSECURITY
IN THE LAST 12 MONTHS, WAS THERE A TIME WHEN YOU DID NOT HAVE A STEADY PLACE TO SLEEP OR SLEPT IN A SHELTER (INCLUDING NOW)?: PATIENT REFUSED

## 2023-11-07 SDOH — ECONOMIC STABILITY: HOUSING INSECURITY

## 2023-11-07 SDOH — ECONOMIC STABILITY: FOOD INSECURITY: WITHIN THE PAST 12 MONTHS, THE FOOD YOU BOUGHT JUST DIDN'T LAST AND YOU DIDN'T HAVE MONEY TO GET MORE.: PATIENT DECLINED

## 2023-11-07 SDOH — HEALTH STABILITY: PHYSICAL HEALTH
ON AVERAGE, HOW MANY DAYS PER WEEK DO YOU ENGAGE IN MODERATE TO STRENUOUS EXERCISE (LIKE A BRISK WALK)?: PATIENT DECLINED

## 2023-11-07 SDOH — ECONOMIC STABILITY: INCOME INSECURITY: HOW HARD IS IT FOR YOU TO PAY FOR THE VERY BASICS LIKE FOOD, HOUSING, MEDICAL CARE, AND HEATING?: PATIENT DECLINED

## 2023-11-07 SDOH — ECONOMIC STABILITY: TRANSPORTATION INSECURITY
IN THE PAST 12 MONTHS, HAS LACK OF TRANSPORTATION KEPT YOU FROM MEETINGS, WORK, OR FROM GETTING THINGS NEEDED FOR DAILY LIVING?: PATIENT DECLINED

## 2023-11-07 SDOH — ECONOMIC STABILITY: TRANSPORTATION INSECURITY
IN THE PAST 12 MONTHS, HAS THE LACK OF TRANSPORTATION KEPT YOU FROM MEDICAL APPOINTMENTS OR FROM GETTING MEDICATIONS?: PATIENT DECLINED

## 2023-11-07 SDOH — HEALTH STABILITY: PHYSICAL HEALTH: ON AVERAGE, HOW MANY MINUTES DO YOU ENGAGE IN EXERCISE AT THIS LEVEL?: PATIENT DECLINED

## 2023-11-07 SDOH — HEALTH STABILITY: MENTAL HEALTH
STRESS IS WHEN SOMEONE FEELS TENSE, NERVOUS, ANXIOUS, OR CAN'T SLEEP AT NIGHT BECAUSE THEIR MIND IS TROUBLED. HOW STRESSED ARE YOU?: VERY MUCH

## 2023-11-07 SDOH — ECONOMIC STABILITY: TRANSPORTATION INSECURITY
IN THE PAST 12 MONTHS, HAS LACK OF RELIABLE TRANSPORTATION KEPT YOU FROM MEDICAL APPOINTMENTS, MEETINGS, WORK OR FROM GETTING THINGS NEEDED FOR DAILY LIVING?: PATIENT DECLINED

## 2023-11-07 SDOH — ECONOMIC STABILITY: FOOD INSECURITY: WITHIN THE PAST 12 MONTHS, YOU WORRIED THAT YOUR FOOD WOULD RUN OUT BEFORE YOU GOT MONEY TO BUY MORE.: PATIENT DECLINED

## 2023-11-07 SDOH — ECONOMIC STABILITY: INCOME INSECURITY: IN THE LAST 12 MONTHS, WAS THERE A TIME WHEN YOU WERE NOT ABLE TO PAY THE MORTGAGE OR RENT ON TIME?: PATIENT REFUSED

## 2023-11-07 ASSESSMENT — SOCIAL DETERMINANTS OF HEALTH (SDOH)
IN A TYPICAL WEEK, HOW MANY TIMES DO YOU TALK ON THE PHONE WITH FAMILY, FRIENDS, OR NEIGHBORS?: PATIENT DECLINED
HOW OFTEN DO YOU GET TOGETHER WITH FRIENDS OR RELATIVES?: PATIENT DECLINED
HOW HARD IS IT FOR YOU TO PAY FOR THE VERY BASICS LIKE FOOD, HOUSING, MEDICAL CARE, AND HEATING?: PATIENT DECLINED
HOW OFTEN DO YOU HAVE SIX OR MORE DRINKS ON ONE OCCASION: DAILY OR ALMOST DAILY
HOW OFTEN DO YOU ATTEND CHURCH OR RELIGIOUS SERVICES?: PATIENT DECLINED
HOW MANY DRINKS CONTAINING ALCOHOL DO YOU HAVE ON A TYPICAL DAY WHEN YOU ARE DRINKING: 7 TO 9
HOW OFTEN DO YOU GET TOGETHER WITH FRIENDS OR RELATIVES?: PATIENT DECLINED
ARE YOU MARRIED, WIDOWED, DIVORCED, SEPARATED, NEVER MARRIED, OR LIVING WITH A PARTNER?: PATIENT DECLINED
ARE YOU MARRIED, WIDOWED, DIVORCED, SEPARATED, NEVER MARRIED, OR LIVING WITH A PARTNER?: PATIENT DECLINED
HOW OFTEN DO YOU ATTENT MEETINGS OF THE CLUB OR ORGANIZATION YOU BELONG TO?: PATIENT DECLINED
HOW OFTEN DO YOU HAVE A DRINK CONTAINING ALCOHOL: 4 OR MORE TIMES A WEEK
HOW OFTEN DO YOU ATTEND CHURCH OR RELIGIOUS SERVICES?: PATIENT DECLINED
DO YOU BELONG TO ANY CLUBS OR ORGANIZATIONS SUCH AS CHURCH GROUPS UNIONS, FRATERNAL OR ATHLETIC GROUPS, OR SCHOOL GROUPS?: PATIENT DECLINED
HOW OFTEN DO YOU ATTENT MEETINGS OF THE CLUB OR ORGANIZATION YOU BELONG TO?: PATIENT DECLINED
WITHIN THE PAST 12 MONTHS, YOU WORRIED THAT YOUR FOOD WOULD RUN OUT BEFORE YOU GOT THE MONEY TO BUY MORE: PATIENT DECLINED
DO YOU BELONG TO ANY CLUBS OR ORGANIZATIONS SUCH AS CHURCH GROUPS UNIONS, FRATERNAL OR ATHLETIC GROUPS, OR SCHOOL GROUPS?: PATIENT DECLINED
IN A TYPICAL WEEK, HOW MANY TIMES DO YOU TALK ON THE PHONE WITH FAMILY, FRIENDS, OR NEIGHBORS?: PATIENT DECLINED

## 2023-11-07 ASSESSMENT — LIFESTYLE VARIABLES
HOW OFTEN DO YOU HAVE A DRINK CONTAINING ALCOHOL: 4 OR MORE TIMES A WEEK
SKIP TO QUESTIONS 9-10: 0
AUDIT-C TOTAL SCORE: 11
HOW MANY STANDARD DRINKS CONTAINING ALCOHOL DO YOU HAVE ON A TYPICAL DAY: 7 TO 9
HOW OFTEN DO YOU HAVE SIX OR MORE DRINKS ON ONE OCCASION: DAILY OR ALMOST DAILY

## 2023-11-08 ENCOUNTER — OFFICE VISIT (OUTPATIENT)
Dept: MEDICAL GROUP | Facility: LAB | Age: 43
End: 2023-11-08
Payer: COMMERCIAL

## 2023-11-08 VITALS
TEMPERATURE: 97.6 F | HEART RATE: 72 BPM | OXYGEN SATURATION: 98 % | DIASTOLIC BLOOD PRESSURE: 58 MMHG | WEIGHT: 156.75 LBS | HEIGHT: 69 IN | BODY MASS INDEX: 23.22 KG/M2 | SYSTOLIC BLOOD PRESSURE: 118 MMHG

## 2023-11-08 DIAGNOSIS — R79.89 LFTS ABNORMAL: ICD-10-CM

## 2023-11-08 DIAGNOSIS — F41.9 ANXIETY: ICD-10-CM

## 2023-11-08 DIAGNOSIS — F10.21 ALCOHOL DEPENDENCE IN REMISSION (HCC): ICD-10-CM

## 2023-11-08 DIAGNOSIS — Z00.00 HEALTHCARE MAINTENANCE: ICD-10-CM

## 2023-11-08 PROBLEM — F10.930 ALCOHOL WITHDRAWAL SYNDROME WITHOUT COMPLICATION (HCC): Status: RESOLVED | Noted: 2022-12-09 | Resolved: 2023-11-08

## 2023-11-08 PROBLEM — R11.0 NAUSEA: Status: RESOLVED | Noted: 2022-01-06 | Resolved: 2023-11-08

## 2023-11-08 PROCEDURE — 3074F SYST BP LT 130 MM HG: CPT | Performed by: STUDENT IN AN ORGANIZED HEALTH CARE EDUCATION/TRAINING PROGRAM

## 2023-11-08 PROCEDURE — 99214 OFFICE O/P EST MOD 30 MIN: CPT | Performed by: STUDENT IN AN ORGANIZED HEALTH CARE EDUCATION/TRAINING PROGRAM

## 2023-11-08 PROCEDURE — 3078F DIAST BP <80 MM HG: CPT | Performed by: STUDENT IN AN ORGANIZED HEALTH CARE EDUCATION/TRAINING PROGRAM

## 2023-11-08 RX ORDER — NALTREXONE HYDROCHLORIDE 50 MG/1
50 TABLET, FILM COATED ORAL DAILY
Qty: 30 TABLET | Refills: 2 | Status: SHIPPED
Start: 2023-11-08 | End: 2023-11-08

## 2023-11-08 RX ORDER — NALTREXONE HYDROCHLORIDE 50 MG/1
100 TABLET, FILM COATED ORAL DAILY
Qty: 70 TABLET | Refills: 0 | Status: SHIPPED
Start: 2023-11-08 | End: 2023-11-22

## 2023-11-08 ASSESSMENT — FIBROSIS 4 INDEX: FIB4 SCORE: 3.24

## 2023-11-08 ASSESSMENT — PATIENT HEALTH QUESTIONNAIRE - PHQ9: CLINICAL INTERPRETATION OF PHQ2 SCORE: 0

## 2023-11-08 NOTE — PROGRESS NOTES
Subjective:     CC: establishing with new provider     HPI:   Raj presents today for the following;    Problem   Lfts Abnormal    Secondary to alcohol use      Alcohol Dependence in Remission (Hcc)    -prior to rehab he was drinking roughly 1.5L of alchol in the past  -has been to rehab 6 times with the last one being 1 month ago roughly   -the longest he has gone without drinking has been 35 days  -he tried AA in the past and he did not find it helpful   -he is looking into another program   -per his girlfriend he drinks sometimes because of anxiety. He also feels he generally does feel he does enjoy certain parts in life      Alcohol Withdrawal Syndrome Without Complication (Hcc) (Resolved)    Here with his partner to discuss his alcohol dependence.  Patient states that he has been to [facility] rehab center 3 times, and most recently was discharged right before the scheduling of this year.  Patient has had nothing but positive experiences there.  He remained sober until a recent trigger by his parents health deteriorating.  He is currently drinking around-the-clock every 3 hours to prevent withdrawal.  Patient states he has never had a seizure from withdrawing.  Patient is riddled with shame and knows that he needs to go back to [facility].  His family and partner are not supportive of this.  Patient states that he is suffered from alcoholism for several years, cannot recall the exact start date.  He states that his parents also struggled with alcoholism.     Nausea (Resolved)       Current Outpatient Medications Ordered in Epic   Medication Sig Dispense Refill    naltrexone (DEPADE) 50 MG Tab Take 2 Tablets by mouth every day. 70 Tablet 0    thiamine (THIAMINE) 100 MG tablet Take 1 Tablet by mouth every day. 30 Tablet 3    folic acid (FOLVITE) 1 MG Tab Take 1 Tablet by mouth every day. 30 Tablet 2    ondansetron (ZOFRAN) 4 MG Tab tablet Take 1 Tablet by mouth every 6 hours as needed for Nausea/Vomiting. 30  "Tablet 1    traZODone (DESYREL) 50 MG Tab Take 1 Tablet by mouth 1 time a day as needed for Sleep. 30 Tablet 3    hydrOXYzine HCl (ATARAX) 25 MG Tab Take 1 Tablet by mouth every 6 hours as needed for Anxiety. 60 Tablet 0     No current Epic-ordered facility-administered medications on file.           ROS:  ROS    Objective:     Exam:  /58 (BP Location: Right arm, Patient Position: Sitting, BP Cuff Size: Adult)   Pulse 72   Temp 36.4 °C (97.6 °F)   Ht 1.753 m (5' 9\")   Wt 71.1 kg (156 lb 12 oz)   SpO2 98%   BMI 23.15 kg/m²  Body mass index is 23.15 kg/m².    Physical Exam          Assessment & Plan:     Problem List Items Addressed This Visit       Alcohol dependence in remission (HCC)    Relevant Medications    naltrexone (DEPADE) 50 MG Tab    Other Relevant Orders    Referral to Behavioral Health    LFTs abnormal     Other Visit Diagnoses       Anxiety        Relevant Orders    Referral to Behavioral Health    Healthcare maintenance        Relevant Orders    VITAMIN D,25 HYDROXY (DEFICIENCY)    HEMOGLOBIN A1C    CBC WITH DIFFERENTIAL    Comp Metabolic Panel    TSH WITH REFLEX TO FT4    Lipid Profile            1 month follow up labs         Please note that this dictation was created using voice recognition software. I have made every reasonable attempt to correct obvious errors, but I expect that there are errors of grammar and possibly content that I did not discover before finalizing the note.        "

## 2023-11-22 ENCOUNTER — OFFICE VISIT (OUTPATIENT)
Dept: URGENT CARE | Facility: CLINIC | Age: 43
End: 2023-11-22
Payer: COMMERCIAL

## 2023-11-22 VITALS
WEIGHT: 153 LBS | OXYGEN SATURATION: 99 % | DIASTOLIC BLOOD PRESSURE: 76 MMHG | HEIGHT: 69 IN | HEART RATE: 113 BPM | RESPIRATION RATE: 14 BRPM | BODY MASS INDEX: 22.66 KG/M2 | TEMPERATURE: 98.6 F | SYSTOLIC BLOOD PRESSURE: 126 MMHG

## 2023-11-22 DIAGNOSIS — M54.50 DORSALGIA OF LUMBOSACRAL REGION: ICD-10-CM

## 2023-11-22 PROCEDURE — 3078F DIAST BP <80 MM HG: CPT | Performed by: FAMILY MEDICINE

## 2023-11-22 PROCEDURE — 3074F SYST BP LT 130 MM HG: CPT | Performed by: FAMILY MEDICINE

## 2023-11-22 PROCEDURE — 99213 OFFICE O/P EST LOW 20 MIN: CPT | Performed by: FAMILY MEDICINE

## 2023-11-22 ASSESSMENT — ENCOUNTER SYMPTOMS
MYALGIAS: 1
BACK PAIN: 1

## 2023-11-22 ASSESSMENT — FIBROSIS 4 INDEX: FIB4 SCORE: 3.24

## 2023-11-22 NOTE — PROGRESS NOTES
"Subjective     Raj Sharpe is a 43 y.o. male who presents with Back Pain      - This is a very pleasant 43 y.o. who has come to the walk-in clinic today for ~5 days w/ non radiating low back pain. Worse w/ movement, better rest. Started after putting up x-mas lights and lifted or twisted wrong. No fever, no trauma, no bowel/bladder dysfunction or lower limb weakness/heaviness.           ALLERGIES:  Patient has no known allergies.     PMH:  Past Medical History:   Diagnosis Date    ETOH abuse     No pertinent past medical history         PSH:  History reviewed. No pertinent surgical history.    MEDS:  No current outpatient medications on file.    ** I have documented what I find to be significant in regards to past medical, social, family and surgical history  in my HPI or under PMH/PSH/FH review section, otherwise it is noncontributory **         HPI    Review of Systems   Musculoskeletal:  Positive for back pain and myalgias.   All other systems reviewed and are negative.             Objective     /76 (BP Location: Right arm, Patient Position: Sitting, BP Cuff Size: Adult long)   Pulse (!) 113   Temp 37 °C (98.6 °F) (Temporal)   Resp 14   Ht 1.753 m (5' 9\")   Wt 69.4 kg (153 lb)   SpO2 99%   BMI 22.59 kg/m²      Physical Exam  Vitals and nursing note reviewed.   Constitutional:       General: He is not in acute distress.     Appearance: Normal appearance. He is well-developed.   HENT:      Head: Normocephalic.   Cardiovascular:      Heart sounds: Normal heart sounds. No murmur heard.  Pulmonary:      Effort: Pulmonary effort is normal. No respiratory distress.   Musculoskeletal:        Back:       Comments: No wounds  No gross deformity  No discoloration or rash  Bilateral lower extremity strength intact.  +2 patella reflex  Negative straight leg raise.   Some pain to palp/rom    Neurological:      Mental Status: He is alert.      Motor: No abnormal muscle tone.   Psychiatric:         Mood and " Affect: Mood normal.         Behavior: Behavior normal.                             Assessment & Plan     1. Dorsalgia of lumbosacral region            - Dx, plan & d/c instructions discussed   - Rest, stay hydrated, OTC Motrin and/or Tylenol as needed      Follow up with your regular primary care providers office within a week to keep them updated and informed of this visit and for regular routine health maintenance check-ups. ER if not improving in 2-3 days or if feeling/getting worse.     Patient left in stable condition     Pertinent prior lab work and/or imaging studies in Epic have been reviewed by me today on day of this visit and taken into account for my treatment and plan today    Pertinent PMH/PSH and/or chronic conditions and medications if any were reviewed today and taken into account for my treatment and plan today    Pertinent prior office visit notes in [facility] have been reviewed by me today on day of this visit.    Please note that this dictation may have been created using voice recognition software, if so I have made every reasonable attempt to correct obvious errors, but I expect that there are errors of grammar and possibly content that I did not discover before finalizing the note.

## 2023-11-22 NOTE — LETTER
November 22, 2023         Patient: Raj Sharpe   YOB: 1980   Date of Visit: 11/22/2023           To Whom it May Concern:    Raj Sharpe was seen in my clinic on 11/22/2023. He may return to work on light duty (no lifting, pulling, pushing more than 25lbs. No back bending, squatting) until 11/30/2023. Excuse any missed days of work in past 7 days due to injury.    If you have any questions or concerns, please don't hesitate to call.        Sincerely,           Omar Jenkins M.D.  Electronically Signed

## 2023-12-12 ENCOUNTER — APPOINTMENT (OUTPATIENT)
Dept: MEDICAL GROUP | Facility: LAB | Age: 43
End: 2023-12-12
Payer: COMMERCIAL

## 2024-01-15 ENCOUNTER — HOSPITAL ENCOUNTER (OUTPATIENT)
Dept: LAB | Facility: MEDICAL CENTER | Age: 44
End: 2024-01-15
Attending: STUDENT IN AN ORGANIZED HEALTH CARE EDUCATION/TRAINING PROGRAM
Payer: COMMERCIAL

## 2024-01-15 DIAGNOSIS — Z00.00 HEALTHCARE MAINTENANCE: ICD-10-CM

## 2024-01-15 LAB
25(OH)D3 SERPL-MCNC: 15 NG/ML (ref 30–100)
ALBUMIN SERPL BCP-MCNC: 4.8 G/DL (ref 3.2–4.9)
ALBUMIN/GLOB SERPL: 1.8 G/DL
ALP SERPL-CCNC: 112 U/L (ref 30–99)
ALT SERPL-CCNC: 178 U/L (ref 2–50)
ANION GAP SERPL CALC-SCNC: 13 MMOL/L (ref 7–16)
AST SERPL-CCNC: 255 U/L (ref 12–45)
BILIRUB SERPL-MCNC: 0.3 MG/DL (ref 0.1–1.5)
BUN SERPL-MCNC: 6 MG/DL (ref 8–22)
CALCIUM ALBUM COR SERPL-MCNC: 8.2 MG/DL (ref 8.5–10.5)
CALCIUM SERPL-MCNC: 8.8 MG/DL (ref 8.5–10.5)
CHLORIDE SERPL-SCNC: 103 MMOL/L (ref 96–112)
CHOLEST SERPL-MCNC: 308 MG/DL (ref 100–199)
CO2 SERPL-SCNC: 27 MMOL/L (ref 20–33)
CREAT SERPL-MCNC: 0.88 MG/DL (ref 0.5–1.4)
EST. AVERAGE GLUCOSE BLD GHB EST-MCNC: 94 MG/DL
FASTING STATUS PATIENT QL REPORTED: NORMAL
GFR SERPLBLD CREATININE-BSD FMLA CKD-EPI: 110 ML/MIN/1.73 M 2
GLOBULIN SER CALC-MCNC: 2.6 G/DL (ref 1.9–3.5)
GLUCOSE SERPL-MCNC: 96 MG/DL (ref 65–99)
HBA1C MFR BLD: 4.9 % (ref 4–5.6)
HDLC SERPL-MCNC: 138 MG/DL
LDLC SERPL CALC-MCNC: 152 MG/DL
POTASSIUM SERPL-SCNC: 4 MMOL/L (ref 3.6–5.5)
PROT SERPL-MCNC: 7.4 G/DL (ref 6–8.2)
SODIUM SERPL-SCNC: 143 MMOL/L (ref 135–145)
TRIGL SERPL-MCNC: 92 MG/DL (ref 0–149)
TSH SERPL DL<=0.005 MIU/L-ACNC: 2.12 UIU/ML (ref 0.38–5.33)

## 2024-01-15 PROCEDURE — 84443 ASSAY THYROID STIM HORMONE: CPT

## 2024-01-15 PROCEDURE — 83036 HEMOGLOBIN GLYCOSYLATED A1C: CPT

## 2024-01-15 PROCEDURE — 36415 COLL VENOUS BLD VENIPUNCTURE: CPT

## 2024-01-15 PROCEDURE — 82306 VITAMIN D 25 HYDROXY: CPT

## 2024-01-15 PROCEDURE — 85025 COMPLETE CBC W/AUTO DIFF WBC: CPT

## 2024-01-15 PROCEDURE — 80053 COMPREHEN METABOLIC PANEL: CPT

## 2024-01-15 PROCEDURE — 80061 LIPID PANEL: CPT

## 2024-01-17 ENCOUNTER — OFFICE VISIT (OUTPATIENT)
Dept: MEDICAL GROUP | Facility: LAB | Age: 44
End: 2024-01-17
Payer: COMMERCIAL

## 2024-01-17 VITALS
TEMPERATURE: 97.8 F | HEART RATE: 88 BPM | BODY MASS INDEX: 21.8 KG/M2 | HEIGHT: 69 IN | DIASTOLIC BLOOD PRESSURE: 72 MMHG | OXYGEN SATURATION: 96 % | SYSTOLIC BLOOD PRESSURE: 124 MMHG | WEIGHT: 147.2 LBS | RESPIRATION RATE: 16 BRPM

## 2024-01-17 DIAGNOSIS — K70.10 ALCOHOLIC HEPATITIS WITHOUT ASCITES: ICD-10-CM

## 2024-01-17 DIAGNOSIS — E55.9 VITAMIN D DEFICIENCY: ICD-10-CM

## 2024-01-17 DIAGNOSIS — F10.21 ALCOHOL DEPENDENCE IN REMISSION (HCC): ICD-10-CM

## 2024-01-17 DIAGNOSIS — R79.89 LFTS ABNORMAL: ICD-10-CM

## 2024-01-17 PROCEDURE — 99214 OFFICE O/P EST MOD 30 MIN: CPT | Performed by: STUDENT IN AN ORGANIZED HEALTH CARE EDUCATION/TRAINING PROGRAM

## 2024-01-17 PROCEDURE — 3074F SYST BP LT 130 MM HG: CPT | Performed by: STUDENT IN AN ORGANIZED HEALTH CARE EDUCATION/TRAINING PROGRAM

## 2024-01-17 PROCEDURE — 3078F DIAST BP <80 MM HG: CPT | Performed by: STUDENT IN AN ORGANIZED HEALTH CARE EDUCATION/TRAINING PROGRAM

## 2024-01-17 RX ORDER — LORAZEPAM 1 MG/1
2 TABLET ORAL EVERY 4 HOURS PRN
Qty: 56 TABLET | Refills: 0 | Status: SHIPPED | OUTPATIENT
Start: 2024-01-17 | End: 2024-01-24

## 2024-01-17 RX ORDER — CHOLECALCIFEROL (VITAMIN D3) 1250 MCG
1 CAPSULE ORAL
Qty: 12 CAPSULE | Refills: 3 | Status: SHIPPED | OUTPATIENT
Start: 2024-01-17

## 2024-01-17 ASSESSMENT — PATIENT HEALTH QUESTIONNAIRE - PHQ9
5. POOR APPETITE OR OVEREATING: 3 - NEARLY EVERY DAY
SUM OF ALL RESPONSES TO PHQ QUESTIONS 1-9: 18
CLINICAL INTERPRETATION OF PHQ2 SCORE: 6

## 2024-01-17 ASSESSMENT — ENCOUNTER SYMPTOMS
SHORTNESS OF BREATH: 0
FEVER: 0
CHILLS: 0

## 2024-01-17 ASSESSMENT — FIBROSIS 4 INDEX: FIB4 SCORE: 5.02

## 2024-01-17 NOTE — PROGRESS NOTES
"Subjective:     CC: labs    HPI:   Raj presents today for the following;    Problem   Vitamin D Deficiency    Level of 15     Alcoholic Hepatitis    Secondary to alcohol use      Alcohol Dependence in Remission (Hcc)    -prior to rehab he was drinking roughly 1.5L of alchol in the past  -has been to rehab 6 times with the last one being 1 month ago roughly   -the longest he has gone without drinking has been 35 days  -he tried AA in the past and he did not find it helpful   -he is looking into another program   -per his girlfriend he drinks sometimes because of anxiety. He also feels he generally does feel he does enjoy certain parts in life   -yesterday he did relapse and did drink alcohol     -he is already on naltrexone 50mg which he reports     1/17/24  -patient is drinking 1.5L of alcohol daily, he was taking naltrexone in the past but he does not want to take this medication          Current Outpatient Medications Ordered in Epic   Medication Sig Dispense Refill    Cholecalciferol (VITAMIN D3) 1.25 MG (70745 UT) Cap Take 1 Capsule by mouth every 7 days. 12 Capsule 3    LORazepam (ATIVAN) 1 MG Tab Take 2 Tablets by mouth every four hours as needed for Anxiety for up to 7 days. Indications: Alcohol Withdrawal Syndrome 56 Tablet 0     No current Epic-ordered facility-administered medications on file.           ROS:  Review of Systems   Constitutional:  Negative for chills and fever.   Respiratory:  Negative for shortness of breath.    Cardiovascular:  Negative for chest pain.       Objective:     Exam:  /72 (BP Location: Right arm, Patient Position: Sitting, BP Cuff Size: Adult)   Pulse 88   Temp 36.6 °C (97.8 °F)   Resp 16   Ht 1.753 m (5' 9\")   Wt 66.8 kg (147 lb 3.2 oz)   SpO2 96%   BMI 21.74 kg/m²  Body mass index is 21.74 kg/m².    Physical Exam  Constitutional:       General: He is not in acute distress.     Appearance: He is not ill-appearing.   Pulmonary:      Effort: Pulmonary effort is " normal.   Neurological:      Mental Status: He is alert.   Psychiatric:         Mood and Affect: Mood normal.         Behavior: Behavior normal.         Thought Content: Thought content normal.         Judgment: Judgment normal.               Assessment & Plan:     Problem List Items Addressed This Visit       Alcohol dependence in remission (HCC)     Acute on chronic  -patient would like work on quitting alcohol   -will provide ativan  -ER precautions discussed          Relevant Medications    LORazepam (ATIVAN) 1 MG Tab    Other Relevant Orders    Controlled Substance Treatment Agreement    Alcoholic hepatitis     Chronic-worsening  -discussed with patient benefit of quitting alcohol            Vitamin D deficiency     New diagnosis  -vit d3 50,000 IUs         Relevant Medications    Cholecalciferol (VITAMIN D3) 1.25 MG (66871 UT) Cap       Follow up next week     Please note that this dictation was created using voice recognition software. I have made every reasonable attempt to correct obvious errors, but I expect that there are errors of grammar and possibly content that I did not discover before finalizing the note.

## 2024-01-18 NOTE — ASSESSMENT & PLAN NOTE
Acute on chronic  -patient would like work on quitting alcohol   -will provide ativan  -ER precautions discussed

## 2024-01-23 ENCOUNTER — APPOINTMENT (OUTPATIENT)
Dept: MEDICAL GROUP | Facility: LAB | Age: 44
End: 2024-01-23
Payer: COMMERCIAL

## 2024-08-08 ENCOUNTER — HOSPITAL ENCOUNTER (INPATIENT)
Facility: MEDICAL CENTER | Age: 44
LOS: 1 days | DRG: 897 | End: 2024-08-10
Attending: STUDENT IN AN ORGANIZED HEALTH CARE EDUCATION/TRAINING PROGRAM | Admitting: HOSPITALIST
Payer: COMMERCIAL

## 2024-08-08 DIAGNOSIS — E83.39 HYPOPHOSPHATEMIA: ICD-10-CM

## 2024-08-08 DIAGNOSIS — E87.6 HYPOKALEMIA: ICD-10-CM

## 2024-08-08 DIAGNOSIS — F10.930 ALCOHOL WITHDRAWAL SYNDROME WITHOUT COMPLICATION (HCC): ICD-10-CM

## 2024-08-08 LAB
BASOPHILS # BLD AUTO: 0.2 % (ref 0–1.8)
BASOPHILS # BLD: 0.02 K/UL (ref 0–0.12)
EOSINOPHIL # BLD AUTO: 0.01 K/UL (ref 0–0.51)
EOSINOPHIL NFR BLD: 0.1 % (ref 0–6.9)
ERYTHROCYTE [DISTWIDTH] IN BLOOD BY AUTOMATED COUNT: 53.6 FL (ref 35.9–50)
HCT VFR BLD AUTO: 44.8 % (ref 42–52)
HGB BLD-MCNC: 15.3 G/DL (ref 14–18)
IMM GRANULOCYTES # BLD AUTO: 0.06 K/UL (ref 0–0.11)
IMM GRANULOCYTES NFR BLD AUTO: 0.5 % (ref 0–0.9)
LYMPHOCYTES # BLD AUTO: 1.03 K/UL (ref 1–4.8)
LYMPHOCYTES NFR BLD: 9 % (ref 22–41)
MCH RBC QN AUTO: 34.9 PG (ref 27–33)
MCHC RBC AUTO-ENTMCNC: 34.2 G/DL (ref 32.3–36.5)
MCV RBC AUTO: 102.3 FL (ref 81.4–97.8)
MONOCYTES # BLD AUTO: 0.82 K/UL (ref 0–0.85)
MONOCYTES NFR BLD AUTO: 7.2 % (ref 0–13.4)
NEUTROPHILS # BLD AUTO: 9.5 K/UL (ref 1.82–7.42)
NEUTROPHILS NFR BLD: 83 % (ref 44–72)
NRBC # BLD AUTO: 0 K/UL
NRBC BLD-RTO: 0 /100 WBC (ref 0–0.2)
PLATELET # BLD AUTO: 164 K/UL (ref 164–446)
PMV BLD AUTO: 10.9 FL (ref 9–12.9)
RBC # BLD AUTO: 4.38 M/UL (ref 4.7–6.1)
WBC # BLD AUTO: 11.4 K/UL (ref 4.8–10.8)

## 2024-08-08 PROCEDURE — 99285 EMERGENCY DEPT VISIT HI MDM: CPT

## 2024-08-08 PROCEDURE — 80053 COMPREHEN METABOLIC PANEL: CPT

## 2024-08-08 PROCEDURE — 96365 THER/PROPH/DIAG IV INF INIT: CPT

## 2024-08-08 PROCEDURE — 85025 COMPLETE CBC W/AUTO DIFF WBC: CPT

## 2024-08-08 PROCEDURE — 83735 ASSAY OF MAGNESIUM: CPT

## 2024-08-08 PROCEDURE — 96366 THER/PROPH/DIAG IV INF ADDON: CPT

## 2024-08-08 PROCEDURE — 83690 ASSAY OF LIPASE: CPT

## 2024-08-08 PROCEDURE — 700105 HCHG RX REV CODE 258: Performed by: STUDENT IN AN ORGANIZED HEALTH CARE EDUCATION/TRAINING PROGRAM

## 2024-08-08 PROCEDURE — 700111 HCHG RX REV CODE 636 W/ 250 OVERRIDE (IP): Performed by: STUDENT IN AN ORGANIZED HEALTH CARE EDUCATION/TRAINING PROGRAM

## 2024-08-08 PROCEDURE — 82077 ASSAY SPEC XCP UR&BREATH IA: CPT

## 2024-08-08 PROCEDURE — 96375 TX/PRO/DX INJ NEW DRUG ADDON: CPT

## 2024-08-08 PROCEDURE — 700101 HCHG RX REV CODE 250: Performed by: STUDENT IN AN ORGANIZED HEALTH CARE EDUCATION/TRAINING PROGRAM

## 2024-08-08 PROCEDURE — 36415 COLL VENOUS BLD VENIPUNCTURE: CPT

## 2024-08-08 PROCEDURE — 93005 ELECTROCARDIOGRAM TRACING: CPT | Performed by: STUDENT IN AN ORGANIZED HEALTH CARE EDUCATION/TRAINING PROGRAM

## 2024-08-08 PROCEDURE — 84100 ASSAY OF PHOSPHORUS: CPT

## 2024-08-08 RX ORDER — LORAZEPAM 2 MG/ML
2 INJECTION INTRAMUSCULAR ONCE
Status: COMPLETED | OUTPATIENT
Start: 2024-08-09 | End: 2024-08-08

## 2024-08-08 RX ORDER — ONDANSETRON 2 MG/ML
4 INJECTION INTRAMUSCULAR; INTRAVENOUS ONCE
Status: COMPLETED | OUTPATIENT
Start: 2024-08-09 | End: 2024-08-08

## 2024-08-08 RX ADMIN — FOLIC ACID: 5 INJECTION, SOLUTION INTRAMUSCULAR; INTRAVENOUS; SUBCUTANEOUS at 23:51

## 2024-08-08 RX ADMIN — ONDANSETRON 4 MG: 2 INJECTION INTRAMUSCULAR; INTRAVENOUS at 23:51

## 2024-08-08 RX ADMIN — LORAZEPAM 2 MG: 2 INJECTION INTRAMUSCULAR; INTRAVENOUS at 23:52

## 2024-08-08 ASSESSMENT — LIFESTYLE VARIABLES
TREMOR: MODERATE TREMOR WITH ARMS EXTENDED
PAROXYSMAL SWEATS: NO SWEAT VISIBLE
HEADACHE, FULLNESS IN HEAD: NOT PRESENT
TOTAL SCORE: 14
AGITATION: *
ANXIETY: MILDLY ANXIOUS
AUDITORY DISTURBANCES: NOT PRESENT
ORIENTATION AND CLOUDING OF SENSORIUM: ORIENTED AND CAN DO SERIAL ADDITIONS
VISUAL DISTURBANCES: VERY MILD SENSITIVITY
NAUSEA AND VOMITING: *

## 2024-08-09 PROBLEM — E80.6 HYPERBILIRUBINEMIA: Status: ACTIVE | Noted: 2024-08-09

## 2024-08-09 PROBLEM — F10.932 ALCOHOL WITHDRAWAL WITH PERCEPTUAL DISTURBANCES (HCC): Status: ACTIVE | Noted: 2024-08-09

## 2024-08-09 PROBLEM — N17.9 AKI (ACUTE KIDNEY INJURY) (HCC): Status: ACTIVE | Noted: 2024-08-09

## 2024-08-09 PROBLEM — E87.6 HYPOKALEMIA: Status: ACTIVE | Noted: 2024-08-09

## 2024-08-09 PROBLEM — Z71.6 ENCOUNTER FOR SMOKING CESSATION COUNSELING: Status: ACTIVE | Noted: 2024-08-09

## 2024-08-09 PROBLEM — D72.829 LEUKOCYTOSIS: Status: ACTIVE | Noted: 2024-08-09

## 2024-08-09 LAB
ALBUMIN SERPL BCP-MCNC: 3.6 G/DL (ref 3.2–4.9)
ALBUMIN SERPL BCP-MCNC: 4.6 G/DL (ref 3.2–4.9)
ALBUMIN/GLOB SERPL: 1.4 G/DL
ALBUMIN/GLOB SERPL: 1.4 G/DL
ALP SERPL-CCNC: 184 U/L (ref 30–99)
ALP SERPL-CCNC: 264 U/L (ref 30–99)
ALT SERPL-CCNC: 31 U/L (ref 2–50)
ALT SERPL-CCNC: 44 U/L (ref 2–50)
ANION GAP SERPL CALC-SCNC: 14 MMOL/L (ref 7–16)
ANION GAP SERPL CALC-SCNC: 40 MMOL/L (ref 7–16)
AST SERPL-CCNC: 71 U/L (ref 12–45)
AST SERPL-CCNC: 96 U/L (ref 12–45)
BILIRUB SERPL-MCNC: 3.1 MG/DL (ref 0.1–1.5)
BILIRUB SERPL-MCNC: 3.4 MG/DL (ref 0.1–1.5)
BUN SERPL-MCNC: 11 MG/DL (ref 8–22)
BUN SERPL-MCNC: 13 MG/DL (ref 8–22)
CALCIUM ALBUM COR SERPL-MCNC: 9 MG/DL (ref 8.5–10.5)
CALCIUM ALBUM COR SERPL-MCNC: 9.5 MG/DL (ref 8.5–10.5)
CALCIUM SERPL-MCNC: 10 MG/DL (ref 8.4–10.2)
CALCIUM SERPL-MCNC: 8.7 MG/DL (ref 8.4–10.2)
CHLORIDE SERPL-SCNC: 80 MMOL/L (ref 96–112)
CHLORIDE SERPL-SCNC: 91 MMOL/L (ref 96–112)
CO2 SERPL-SCNC: 17 MMOL/L (ref 20–33)
CO2 SERPL-SCNC: 30 MMOL/L (ref 20–33)
CREAT SERPL-MCNC: 1 MG/DL (ref 0.5–1.4)
CREAT SERPL-MCNC: 1.41 MG/DL (ref 0.5–1.4)
EKG IMPRESSION: NORMAL
ETHANOL BLD-MCNC: <10.1 MG/DL
ETHANOL BLD-MCNC: <10.1 MG/DL
GFR SERPLBLD CREATININE-BSD FMLA CKD-EPI: 63 ML/MIN/1.73 M 2
GFR SERPLBLD CREATININE-BSD FMLA CKD-EPI: 96 ML/MIN/1.73 M 2
GLOBULIN SER CALC-MCNC: 2.5 G/DL (ref 1.9–3.5)
GLOBULIN SER CALC-MCNC: 3.4 G/DL (ref 1.9–3.5)
GLUCOSE SERPL-MCNC: 98 MG/DL (ref 65–99)
GLUCOSE SERPL-MCNC: 98 MG/DL (ref 65–99)
LIPASE SERPL-CCNC: 32 U/L (ref 11–82)
MAGNESIUM SERPL-MCNC: 1.6 MG/DL (ref 1.5–2.5)
MAGNESIUM SERPL-MCNC: 1.9 MG/DL (ref 1.5–2.5)
PHOSPHATE SERPL-MCNC: 6.2 MG/DL (ref 2.5–4.5)
POTASSIUM SERPL-SCNC: 3 MMOL/L (ref 3.6–5.5)
POTASSIUM SERPL-SCNC: 3.3 MMOL/L (ref 3.6–5.5)
PROT SERPL-MCNC: 6.1 G/DL (ref 6–8.2)
PROT SERPL-MCNC: 8 G/DL (ref 6–8.2)
SODIUM SERPL-SCNC: 135 MMOL/L (ref 135–145)
SODIUM SERPL-SCNC: 137 MMOL/L (ref 135–145)

## 2024-08-09 PROCEDURE — 83735 ASSAY OF MAGNESIUM: CPT

## 2024-08-09 PROCEDURE — 99222 1ST HOSP IP/OBS MODERATE 55: CPT | Mod: 25 | Performed by: HOSPITALIST

## 2024-08-09 PROCEDURE — 96376 TX/PRO/DX INJ SAME DRUG ADON: CPT

## 2024-08-09 PROCEDURE — A9270 NON-COVERED ITEM OR SERVICE: HCPCS | Performed by: HOSPITALIST

## 2024-08-09 PROCEDURE — 700102 HCHG RX REV CODE 250 W/ 637 OVERRIDE(OP): Performed by: HOSPITALIST

## 2024-08-09 PROCEDURE — 700111 HCHG RX REV CODE 636 W/ 250 OVERRIDE (IP): Performed by: INTERNAL MEDICINE

## 2024-08-09 PROCEDURE — 94760 N-INVAS EAR/PLS OXIMETRY 1: CPT

## 2024-08-09 PROCEDURE — 36415 COLL VENOUS BLD VENIPUNCTURE: CPT

## 2024-08-09 PROCEDURE — 700111 HCHG RX REV CODE 636 W/ 250 OVERRIDE (IP): Performed by: STUDENT IN AN ORGANIZED HEALTH CARE EDUCATION/TRAINING PROGRAM

## 2024-08-09 PROCEDURE — 700101 HCHG RX REV CODE 250: Performed by: HOSPITALIST

## 2024-08-09 PROCEDURE — 770020 HCHG ROOM/CARE - TELE (206)

## 2024-08-09 PROCEDURE — 99407 BEHAV CHNG SMOKING > 10 MIN: CPT | Performed by: HOSPITALIST

## 2024-08-09 PROCEDURE — 80053 COMPREHEN METABOLIC PANEL: CPT

## 2024-08-09 PROCEDURE — 82077 ASSAY SPEC XCP UR&BREATH IA: CPT

## 2024-08-09 PROCEDURE — 700111 HCHG RX REV CODE 636 W/ 250 OVERRIDE (IP): Mod: JZ | Performed by: HOSPITALIST

## 2024-08-09 RX ORDER — LORAZEPAM 0.5 MG/1
0.5 TABLET ORAL EVERY 4 HOURS PRN
Status: DISCONTINUED | OUTPATIENT
Start: 2024-08-09 | End: 2024-08-10 | Stop reason: HOSPADM

## 2024-08-09 RX ORDER — MAGNESIUM SULFATE HEPTAHYDRATE 40 MG/ML
2 INJECTION, SOLUTION INTRAVENOUS ONCE
Status: COMPLETED | OUTPATIENT
Start: 2024-08-09 | End: 2024-08-09

## 2024-08-09 RX ORDER — GAUZE BANDAGE 2" X 2"
100 BANDAGE TOPICAL DAILY
Status: DISCONTINUED | OUTPATIENT
Start: 2024-08-09 | End: 2024-08-10 | Stop reason: HOSPADM

## 2024-08-09 RX ORDER — POLYETHYLENE GLYCOL 3350 17 G/17G
1 POWDER, FOR SOLUTION ORAL
Status: DISCONTINUED | OUTPATIENT
Start: 2024-08-09 | End: 2024-08-10 | Stop reason: HOSPADM

## 2024-08-09 RX ORDER — LORAZEPAM 2 MG/ML
2 INJECTION INTRAMUSCULAR
Status: DISCONTINUED | OUTPATIENT
Start: 2024-08-09 | End: 2024-08-10 | Stop reason: HOSPADM

## 2024-08-09 RX ORDER — LORAZEPAM 2 MG/ML
0.5 INJECTION INTRAMUSCULAR EVERY 4 HOURS PRN
Status: DISCONTINUED | OUTPATIENT
Start: 2024-08-09 | End: 2024-08-10 | Stop reason: HOSPADM

## 2024-08-09 RX ORDER — LORAZEPAM 2 MG/ML
1 INJECTION INTRAMUSCULAR
Status: DISCONTINUED | OUTPATIENT
Start: 2024-08-09 | End: 2024-08-10 | Stop reason: HOSPADM

## 2024-08-09 RX ORDER — LORAZEPAM 2 MG/ML
1.5 INJECTION INTRAMUSCULAR
Status: DISCONTINUED | OUTPATIENT
Start: 2024-08-09 | End: 2024-08-10 | Stop reason: HOSPADM

## 2024-08-09 RX ORDER — PROCHLORPERAZINE EDISYLATE 5 MG/ML
5-10 INJECTION INTRAMUSCULAR; INTRAVENOUS EVERY 4 HOURS PRN
Status: DISCONTINUED | OUTPATIENT
Start: 2024-08-09 | End: 2024-08-10 | Stop reason: HOSPADM

## 2024-08-09 RX ORDER — PROMETHAZINE HYDROCHLORIDE 25 MG/1
12.5-25 TABLET ORAL EVERY 4 HOURS PRN
Status: DISCONTINUED | OUTPATIENT
Start: 2024-08-09 | End: 2024-08-10 | Stop reason: HOSPADM

## 2024-08-09 RX ORDER — LORAZEPAM 1 MG/1
1 TABLET ORAL EVERY 4 HOURS PRN
Status: DISCONTINUED | OUTPATIENT
Start: 2024-08-09 | End: 2024-08-10 | Stop reason: HOSPADM

## 2024-08-09 RX ORDER — AMOXICILLIN 250 MG
2 CAPSULE ORAL 2 TIMES DAILY
Status: DISCONTINUED | OUTPATIENT
Start: 2024-08-09 | End: 2024-08-10 | Stop reason: HOSPADM

## 2024-08-09 RX ORDER — SODIUM CHLORIDE AND POTASSIUM CHLORIDE 150; 900 MG/100ML; MG/100ML
INJECTION, SOLUTION INTRAVENOUS CONTINUOUS
Status: DISCONTINUED | OUTPATIENT
Start: 2024-08-09 | End: 2024-08-10 | Stop reason: HOSPADM

## 2024-08-09 RX ORDER — LORAZEPAM 1 MG/1
3 TABLET ORAL
Status: DISCONTINUED | OUTPATIENT
Start: 2024-08-09 | End: 2024-08-10 | Stop reason: HOSPADM

## 2024-08-09 RX ORDER — ONDANSETRON 2 MG/ML
4 INJECTION INTRAMUSCULAR; INTRAVENOUS EVERY 4 HOURS PRN
Status: DISCONTINUED | OUTPATIENT
Start: 2024-08-09 | End: 2024-08-10 | Stop reason: HOSPADM

## 2024-08-09 RX ORDER — LORAZEPAM 2 MG/ML
1 INJECTION INTRAMUSCULAR ONCE
Status: COMPLETED | OUTPATIENT
Start: 2024-08-09 | End: 2024-08-09

## 2024-08-09 RX ORDER — POTASSIUM CHLORIDE 7.45 MG/ML
10 INJECTION INTRAVENOUS
Status: DISCONTINUED | OUTPATIENT
Start: 2024-08-09 | End: 2024-08-09

## 2024-08-09 RX ORDER — LORAZEPAM 1 MG/1
4 TABLET ORAL
Status: DISCONTINUED | OUTPATIENT
Start: 2024-08-09 | End: 2024-08-10 | Stop reason: HOSPADM

## 2024-08-09 RX ORDER — LORAZEPAM 1 MG/1
2 TABLET ORAL
Status: DISCONTINUED | OUTPATIENT
Start: 2024-08-09 | End: 2024-08-10 | Stop reason: HOSPADM

## 2024-08-09 RX ORDER — ONDANSETRON 4 MG/1
4 TABLET, ORALLY DISINTEGRATING ORAL EVERY 4 HOURS PRN
Status: DISCONTINUED | OUTPATIENT
Start: 2024-08-09 | End: 2024-08-10 | Stop reason: HOSPADM

## 2024-08-09 RX ORDER — PROMETHAZINE HYDROCHLORIDE 25 MG/1
12.5-25 SUPPOSITORY RECTAL EVERY 4 HOURS PRN
Status: DISCONTINUED | OUTPATIENT
Start: 2024-08-09 | End: 2024-08-10 | Stop reason: HOSPADM

## 2024-08-09 RX ORDER — FOLIC ACID 1 MG/1
1 TABLET ORAL DAILY
Status: DISCONTINUED | OUTPATIENT
Start: 2024-08-09 | End: 2024-08-10 | Stop reason: HOSPADM

## 2024-08-09 RX ORDER — NICOTINE 21 MG/24HR
1 PATCH, TRANSDERMAL 24 HOURS TRANSDERMAL
Status: DISCONTINUED | OUTPATIENT
Start: 2024-08-09 | End: 2024-08-10 | Stop reason: HOSPADM

## 2024-08-09 RX ADMIN — ONDANSETRON 4 MG: 2 INJECTION INTRAMUSCULAR; INTRAVENOUS at 02:48

## 2024-08-09 RX ADMIN — POTASSIUM CHLORIDE AND SODIUM CHLORIDE: 900; 150 INJECTION, SOLUTION INTRAVENOUS at 12:49

## 2024-08-09 RX ADMIN — FOLIC ACID 1 MG: 1 TABLET ORAL at 06:12

## 2024-08-09 RX ADMIN — MULTIVITAMIN TABLET 1 TABLET: TABLET at 06:11

## 2024-08-09 RX ADMIN — LORAZEPAM 1 MG: 2 INJECTION INTRAMUSCULAR; INTRAVENOUS at 00:57

## 2024-08-09 RX ADMIN — LORAZEPAM 2 MG: 1 TABLET ORAL at 06:11

## 2024-08-09 RX ADMIN — LORAZEPAM 1 MG: 1 TABLET ORAL at 12:36

## 2024-08-09 RX ADMIN — NICOTINE TRANSDERMAL SYSTEM 21 MG: 21 PATCH, EXTENDED RELEASE TRANSDERMAL at 02:48

## 2024-08-09 RX ADMIN — MAGNESIUM SULFATE HEPTAHYDRATE 2 G: 2 INJECTION, SOLUTION INTRAVENOUS at 12:48

## 2024-08-09 RX ADMIN — LORAZEPAM 0.5 MG: 0.5 TABLET ORAL at 08:37

## 2024-08-09 RX ADMIN — LORAZEPAM 1 MG: 1 TABLET ORAL at 02:49

## 2024-08-09 RX ADMIN — POTASSIUM CHLORIDE AND SODIUM CHLORIDE: 900; 150 INJECTION, SOLUTION INTRAVENOUS at 22:54

## 2024-08-09 RX ADMIN — Medication 100 MG: at 06:12

## 2024-08-09 RX ADMIN — LORAZEPAM 0.5 MG: 0.5 TABLET ORAL at 17:01

## 2024-08-09 RX ADMIN — LORAZEPAM 1 MG: 1 TABLET ORAL at 20:21

## 2024-08-09 SDOH — ECONOMIC STABILITY: TRANSPORTATION INSECURITY
IN THE PAST 12 MONTHS, HAS THE LACK OF TRANSPORTATION KEPT YOU FROM MEDICAL APPOINTMENTS OR FROM GETTING MEDICATIONS?: NO

## 2024-08-09 SDOH — ECONOMIC STABILITY: TRANSPORTATION INSECURITY
IN THE PAST 12 MONTHS, HAS LACK OF RELIABLE TRANSPORTATION KEPT YOU FROM MEDICAL APPOINTMENTS, MEETINGS, WORK OR FROM GETTING THINGS NEEDED FOR DAILY LIVING?: NO

## 2024-08-09 ASSESSMENT — ENCOUNTER SYMPTOMS
FEVER: 0
BRUISES/BLEEDS EASILY: 0
DIZZINESS: 0
SORE THROAT: 0
INSOMNIA: 0
COUGH: 0
WEAKNESS: 0
SHORTNESS OF BREATH: 0
MYALGIAS: 0
PALPITATIONS: 0
DEPRESSION: 0
BLURRED VISION: 0
NERVOUS/ANXIOUS: 1
HEADACHES: 0
NAUSEA: 1
DOUBLE VISION: 0
NECK PAIN: 0
VOMITING: 1

## 2024-08-09 ASSESSMENT — LIFESTYLE VARIABLES
PAROXYSMAL SWEATS: NO SWEAT VISIBLE
VISUAL DISTURBANCES: NOT PRESENT
TOTAL SCORE: 6
HAVE PEOPLE ANNOYED YOU BY CRITICIZING YOUR DRINKING: YES
ON A TYPICAL DAY WHEN YOU DRINK ALCOHOL HOW MANY DRINKS DO YOU HAVE: 4
HEADACHE, FULLNESS IN HEAD: MILD
NAUSEA AND VOMITING: NO NAUSEA AND NO VOMITING
PAROXYSMAL SWEATS: NO SWEAT VISIBLE
TREMOR: *
PAROXYSMAL SWEATS: BARELY PERCEPTIBLE SWEATING. PALMS MOIST
AUDITORY DISTURBANCES: NOT PRESENT
HEADACHE, FULLNESS IN HEAD: VERY MILD
TOTAL SCORE: 4
PAROXYSMAL SWEATS: BARELY PERCEPTIBLE SWEATING. PALMS MOIST
AGITATION: NORMAL ACTIVITY
ORIENTATION AND CLOUDING OF SENSORIUM: ORIENTED AND CAN DO SERIAL ADDITIONS
TOTAL SCORE: 8
DOES PATIENT WANT TO TALK TO SOMEONE ABOUT QUITTING: YES
TREMOR: *
ORIENTATION AND CLOUDING OF SENSORIUM: DATE DISORIENTATION BY NO MORE THAN TWO CALENDAR DAYS
NAUSEA AND VOMITING: NO NAUSEA AND NO VOMITING
VISUAL DISTURBANCES: NOT PRESENT
NAUSEA AND VOMITING: NO NAUSEA AND NO VOMITING
ORIENTATION AND CLOUDING OF SENSORIUM: DATE DISORIENTATION BY NO MORE THAN TWO CALENDAR DAYS
AGITATION: NORMAL ACTIVITY
AUDITORY DISTURBANCES: NOT PRESENT
HEADACHE, FULLNESS IN HEAD: NOT PRESENT
TOTAL SCORE: VERY MILD ITCHING, PINS AND NEEDLES SENSATION, BURNING OR NUMBNESS
NAUSEA AND VOMITING: MILD NAUSEA WITH NO VOMITING
TOTAL SCORE: VERY MILD ITCHING, PINS AND NEEDLES SENSATION, BURNING OR NUMBNESS
AUDITORY DISTURBANCES: VERY MILD HARSHNESS OR ABILITY TO FRIGHTEN
EVER HAD A DRINK FIRST THING IN THE MORNING TO STEADY YOUR NERVES TO GET RID OF A HANGOVER: YES
AGITATION: SOMEWHAT MORE THAN NORMAL ACTIVITY
TOTAL SCORE: 9
NAUSEA AND VOMITING: NO NAUSEA AND NO VOMITING
AGITATION: SOMEWHAT MORE THAN NORMAL ACTIVITY
TREMOR: *
AUDITORY DISTURBANCES: NOT PRESENT
TOTAL SCORE: 8
ANXIETY: *
AUDITORY DISTURBANCES: NOT PRESENT
ORIENTATION AND CLOUDING OF SENSORIUM: ORIENTED AND CAN DO SERIAL ADDITIONS
ANXIETY: *
VISUAL DISTURBANCES: VERY MILD SENSITIVITY
AVERAGE NUMBER OF DAYS PER WEEK YOU HAVE A DRINK CONTAINING ALCOHOL: 7
TOTAL SCORE: 11
EVER FELT BAD OR GUILTY ABOUT YOUR DRINKING: YES
ANXIETY: MILDLY ANXIOUS
HEADACHE, FULLNESS IN HEAD: VERY MILD
TREMOR: *
ORIENTATION AND CLOUDING OF SENSORIUM: ORIENTED AND CAN DO SERIAL ADDITIONS
PAROXYSMAL SWEATS: BARELY PERCEPTIBLE SWEATING. PALMS MOIST
VISUAL DISTURBANCES: VERY MILD SENSITIVITY
HAVE YOU EVER FELT YOU SHOULD CUT DOWN ON YOUR DRINKING: YES
DOES PATIENT WANT TO STOP DRINKING: YES
TOTAL SCORE: 4
AGITATION: NORMAL ACTIVITY
AGITATION: NORMAL ACTIVITY
HEADACHE, FULLNESS IN HEAD: NOT PRESENT
TOTAL SCORE: 4
TREMOR: *
VISUAL DISTURBANCES: MILD SENSITIVITY
TOTAL SCORE: 5
ANXIETY: MILDLY ANXIOUS
ORIENTATION AND CLOUDING OF SENSORIUM: ORIENTED AND CAN DO SERIAL ADDITIONS
ALCOHOL_USE: YES
VISUAL DISTURBANCES: NOT PRESENT
AUDITORY DISTURBANCES: NOT PRESENT
CONSUMPTION TOTAL: POSITIVE
HEADACHE, FULLNESS IN HEAD: NOT PRESENT
PAROXYSMAL SWEATS: BARELY PERCEPTIBLE SWEATING. PALMS MOIST
HOW MANY TIMES IN THE PAST YEAR HAVE YOU HAD 5 OR MORE DRINKS IN A DAY: 100
TREMOR: *
ANXIETY: *
NAUSEA AND VOMITING: NO NAUSEA AND NO VOMITING
ANXIETY: *

## 2024-08-09 ASSESSMENT — PATIENT HEALTH QUESTIONNAIRE - PHQ9
SUM OF ALL RESPONSES TO PHQ QUESTIONS 1-9: 14
2. FEELING DOWN, DEPRESSED, IRRITABLE, OR HOPELESS: SEVERAL DAYS
9. THOUGHTS THAT YOU WOULD BE BETTER OFF DEAD, OR OF HURTING YOURSELF: MORE THAN HALF THE DAYS
5. POOR APPETITE OR OVEREATING: NEARLY EVERY DAY
4. FEELING TIRED OR HAVING LITTLE ENERGY: NOT AT ALL
8. MOVING OR SPEAKING SO SLOWLY THAT OTHER PEOPLE COULD HAVE NOTICED. OR THE OPPOSITE, BEING SO FIGETY OR RESTLESS THAT YOU HAVE BEEN MOVING AROUND A LOT MORE THAN USUAL: NOT AT ALL
6. FEELING BAD ABOUT YOURSELF - OR THAT YOU ARE A FAILURE OR HAVE LET YOURSELF OR YOUR FAMILY DOWN: NEARLY EVERY DAY
SUM OF ALL RESPONSES TO PHQ9 QUESTIONS 1 AND 2: 3
7. TROUBLE CONCENTRATING ON THINGS, SUCH AS READING THE NEWSPAPER OR WATCHING TELEVISION: NOT AT ALL
3. TROUBLE FALLING OR STAYING ASLEEP OR SLEEPING TOO MUCH: NEARLY EVERY DAY
1. LITTLE INTEREST OR PLEASURE IN DOING THINGS: MORE THAN HALF THE DAYS

## 2024-08-09 ASSESSMENT — PAIN DESCRIPTION - PAIN TYPE
TYPE: ACUTE PAIN

## 2024-08-09 ASSESSMENT — SOCIAL DETERMINANTS OF HEALTH (SDOH)
WITHIN THE PAST 12 MONTHS, YOU WORRIED THAT YOUR FOOD WOULD RUN OUT BEFORE YOU GOT THE MONEY TO BUY MORE: NEVER TRUE
WITHIN THE LAST YEAR, HAVE YOU BEEN HUMILIATED OR EMOTIONALLY ABUSED IN OTHER WAYS BY YOUR PARTNER OR EX-PARTNER?: NO
WITHIN THE LAST YEAR, HAVE YOU BEEN AFRAID OF YOUR PARTNER OR EX-PARTNER?: NO
WITHIN THE LAST YEAR, HAVE TO BEEN RAPED OR FORCED TO HAVE ANY KIND OF SEXUAL ACTIVITY BY YOUR PARTNER OR EX-PARTNER?: NO
WITHIN THE PAST 12 MONTHS, THE FOOD YOU BOUGHT JUST DIDN'T LAST AND YOU DIDN'T HAVE MONEY TO GET MORE: NEVER TRUE
WITHIN THE LAST YEAR, HAVE YOU BEEN KICKED, HIT, SLAPPED, OR OTHERWISE PHYSICALLY HURT BY YOUR PARTNER OR EX-PARTNER?: NO
IN THE PAST 12 MONTHS, HAS THE ELECTRIC, GAS, OIL, OR WATER COMPANY THREATENED TO SHUT OFF SERVICE IN YOUR HOME?: NO

## 2024-08-09 ASSESSMENT — COGNITIVE AND FUNCTIONAL STATUS - GENERAL
MOBILITY SCORE: 24
SUGGESTED CMS G CODE MODIFIER DAILY ACTIVITY: CH
SUGGESTED CMS G CODE MODIFIER MOBILITY: CH
DAILY ACTIVITIY SCORE: 24

## 2024-08-09 ASSESSMENT — FIBROSIS 4 INDEX
FIB4 SCORE: 3.79
FIB4 SCORE: 3.79

## 2024-08-09 NOTE — ASSESSMENT & PLAN NOTE
-Observation status on telemetry unit.  -Patient tachycardic and alcohol withdrawal also with TRISTAN.  -Patient received 2 mg of IV Ativan in the emergency department and I am starting him on CIWA protocol.  -He denies prior history of alcohol withdrawal seizures.  -Patient was given a rally bag in the emergency department.

## 2024-08-09 NOTE — PROGRESS NOTES
Please see the H&P Dictated After Midnight for full details    Interval Progress Note:  Patient seen and examined    44-year-old male with history of alcohol abuse admitted 1 week after Tritus of alcohol with nausea vomiting weakness found to have alcohol withdrawal.  He also demonstrated leukocytosis, hypokalemia and acute kidney injury with creatinine of 1.4.  Elevated alk phos 264, elevated bilirubin 3.4 and anion gap of 40.    He was admitted for aggressive IV hydration and alcohol withdrawal protocol.    Plan:  Magnesium low this morning, replace  Continue nicotine patch  Multivitamins  NS with 20 of potassium, trend K  Continue CIWA protocol    Tia Espinoza D.O.

## 2024-08-09 NOTE — ED TRIAGE NOTES
"Chief Complaint   Patient presents with    ETOH Withdrawal     Stopped drinking 1 week ago.. States \"states vomiting and feeling bad\". Last drink today \"2 shots\" states usually drink 1 liter vodka per day.     Pulse (!) 141   Temp 37.1 °C (98.7 °F) (Temporal)   Resp 18   Ht 1.753 m (5' 9\")   Wt 59 kg (130 lb 1.1 oz)   SpO2 98%   BMI 19.21 kg/m²     To room 9 with girlfriend. To monitor, Nibp and cont pulse ox. See triage note / assessment.    "

## 2024-08-09 NOTE — ASSESSMENT & PLAN NOTE
-Patient reports smoking 1-1/2 cigarettes a day.  He does considers quit smoking but feels like at this point his priority is to focus on quit drinking.  Patient considers nicotine patch. For Smoking cessation efficacy, varenicline (Chantix) for smoking in combination with Naltrexone (for ETOH abuse) may be a good combination for him to consider. I encouraged him to look into both smoking and ETOH cessation strategies    Total amount of time dedicated to was 14 min

## 2024-08-09 NOTE — ED PROVIDER NOTES
ED Provider Note    CHIEF COMPLAINT  Vomiting    EXTERNAL RECORDS REVIEWED  Outpatient Notes patient has history of alcohol dependence, was last seen by PCP December 2022 and was prescribed Valium for alcohol withdrawal.    HPI/ROS  LIMITATION TO HISTORY   Select: : None  OUTSIDE HISTORIAN(S):  Partner at bedside who provides further history    Raj Sharpe is a 44 y.o. male with history of alcohol use disorder who presents for evaluation of vomiting that he attributes to alcohol withdrawal.  He notes that his history of alcohol use disorder.  He drinks approximately 1.5 L of vodka daily.  He states that he stopped drinking last Wednesday therefore 8 days ago but his partner states that he still been drinking daily.  His last drink was 2 shots of vodka at 2 PM.  He has no abdominal pain.  He is feeling shaky.  He has been in alcohol withdrawal before but has never been admitted to the hospital however has been in a detox center.  He has never had an alcohol withdrawal seizure in the past.  He has been drinking alcohol since he was 16 years old.  He has been in rehab several times.  He was last at [facility] 9 months ago and stayed there for 30 days however relapsed the same day that he was released from rehab.  He denies any other drug use.    PAST MEDICAL HISTORY   has a past medical history of ETOH abuse and No pertinent past medical history.    SURGICAL HISTORY  patient denies any surgical history    FAMILY HISTORY  Family History   Problem Relation Age of Onset    No Known Problems Mother     Hyperlipidemia Father     No Known Problems Sister        SOCIAL HISTORY  Social History     Tobacco Use    Smoking status: Every Day     Current packs/day: 1.00     Average packs/day: 1 pack/day for 15.0 years (15.0 ttl pk-yrs)     Types: Cigarettes    Smokeless tobacco: Never   Vaping Use    Vaping status: Former    Substances: Nicotine   Substance and Sexual Activity    Alcohol use: Yes     Alcohol/week: 1.2  "oz     Types: 2 Cans of beer per week    Drug use: Never    Sexual activity: Yes     Partners: Female     Birth control/protection: None       CURRENT MEDICATIONS  Home Medications    **Home medications have not yet been reviewed for this encounter**         ALLERGIES  No Known Allergies    PHYSICAL EXAM  VITAL SIGNS: Pulse (!) 141   Temp 37.1 °C (98.7 °F) (Temporal)   Resp 18   Ht 1.753 m (5' 9\")   Wt 59 kg (130 lb 1.1 oz)   SpO2 98%   BMI 19.21 kg/m²      Constitutional: AlertTremulous, actively dry heaving in emesis bag  HEENT: Normocephalic, Atraumatic,  external ears normal, pharynx pink,  Mucous  Membranes dry, No rhinorrhea or mucosal edema  Eyes: PERRL, EOMI, Conjunctiva normal, No discharge.   Cardiovascular: Tachycardic, Regular Rhythm, No murmurs,  rubs, or gallops.   Thorax & Lungs: Lungs clear to auscultation bilaterally, No respiratory distress, No wheezes, rhales or rhonchi, No chest wall tenderness.   Abdomen: Soft, non tender, non distended,  No pulsatile masses., no rebound guarding or peritoneal signs.   Skin: Warm, Dry, No erythema, No rash,   Back:  No CVA tenderness,  No spinal tenderness, bony crepitance step offs or instability.   Extremities: Equal, intact distal pulses, No cyanosis, clubbing or edema,  No tenderness.   Neurologic: Alert & oriented, tremulous, tongue fasciculations noted  Psychiatric: Affect normal, Judgment normal, Mood normal.      EKG/LABS  Results for orders placed or performed during the hospital encounter of 08/08/24   CBC WITH DIFFERENTIAL   Result Value Ref Range    WBC 11.4 (H) 4.8 - 10.8 K/uL    RBC 4.38 (L) 4.70 - 6.10 M/uL    Hemoglobin 15.3 14.0 - 18.0 g/dL    Hematocrit 44.8 42.0 - 52.0 %    .3 (H) 81.4 - 97.8 fL    MCH 34.9 (H) 27.0 - 33.0 pg    MCHC 34.2 32.3 - 36.5 g/dL    RDW 53.6 (H) 35.9 - 50.0 fL    Platelet Count 164 164 - 446 K/uL    MPV 10.9 9.0 - 12.9 fL    Neutrophils-Polys 83.00 (H) 44.00 - 72.00 %    Lymphocytes 9.00 (L) 22.00 - 41.00 " %    Monocytes 7.20 0.00 - 13.40 %    Eosinophils 0.10 0.00 - 6.90 %    Basophils 0.20 0.00 - 1.80 %    Immature Granulocytes 0.50 0.00 - 0.90 %    Nucleated RBC 0.00 0.00 - 0.20 /100 WBC    Neutrophils (Absolute) 9.50 (H) 1.82 - 7.42 K/uL    Lymphs (Absolute) 1.03 1.00 - 4.80 K/uL    Monos (Absolute) 0.82 0.00 - 0.85 K/uL    Eos (Absolute) 0.01 0.00 - 0.51 K/uL    Baso (Absolute) 0.02 0.00 - 0.12 K/uL    Immature Granulocytes (abs) 0.06 0.00 - 0.11 K/uL    NRBC (Absolute) 0.00 K/uL   COMP METABOLIC PANEL   Result Value Ref Range    Sodium 137 135 - 145 mmol/L    Potassium 3.0 (L) 3.6 - 5.5 mmol/L    Chloride 80 (L) 96 - 112 mmol/L    Co2 17 (L) 20 - 33 mmol/L    Anion Gap 40.0 (H) 7.0 - 16.0    Glucose 98 65 - 99 mg/dL    Bun 11 8 - 22 mg/dL    Creatinine 1.41 (H) 0.50 - 1.40 mg/dL    Calcium 10.0 8.4 - 10.2 mg/dL    Correct Calcium 9.5 8.5 - 10.5 mg/dL    AST(SGOT) 96 (H) 12 - 45 U/L    ALT(SGPT) 44 2 - 50 U/L    Alkaline Phosphatase 264 (H) 30 - 99 U/L    Total Bilirubin 3.4 (H) 0.1 - 1.5 mg/dL    Albumin 4.6 3.2 - 4.9 g/dL    Total Protein 8.0 6.0 - 8.2 g/dL    Globulin 3.4 1.9 - 3.5 g/dL    A-G Ratio 1.4 g/dL   LIPASE   Result Value Ref Range    Lipase 32 11 - 82 U/L   ETHYL ALCOHOL (BLOOD)   Result Value Ref Range    Diagnostic Alcohol <10.1 <10.1 mg/dL   ESTIMATED GFR   Result Value Ref Range    GFR (CKD-EPI) 63 >60 mL/min/1.73 m 2   Magnesium   Result Value Ref Range    Magnesium 1.6 1.5 - 2.5 mg/dL   Phosphorus   Result Value Ref Range    Phosphorus 6.2 (H) 2.5 - 4.5 mg/dL   EKG   Result Value Ref Range    Report       [facility] Emergency Dept.    Test Date:  2024  Pt Name:    SAMAN HERNANDEZ                Department: [facility]  MRN:        [id number]                      [facility]  Gender:     Male                         Technician: VICK  :        [id number]                   Requested By:ARIA ADRIAN  Order #:    [id number]                    Reading MD: Aria  Evie    Measurements  Intervals                                Axis  Rate:       121                          P:          40  CO:         111                          QRS:        58  QRSD:       88                           T:          28  QT:         338  QTc:        480    Interpretive Statements  Sinus tachycardia  Borderline ST depression, anterolateral leads, likely rate dependent  Borderline prolonged QT interval  Normal axis  No ST elevation  Compared to ECG 11/09/2021 16:26:12  ST (T wave) deviation now present  Sinus rhythm no longer present  Electronically Signed On 08- 00:48:20  PDT by Meagan Case         I have independently interpreted this EKG          COURSE & MEDICAL DECISION MAKING    ASSESSMENT, COURSE AND PLAN  Care Narrative:   This is a 44-year-old male with history of alcohol use disorder who is presenting for evaluation of nausea, vomiting, tremors in the setting of drinking alcohol.  He has long history of drinking alcohol.  He is never had any alcohol withdrawal seizures in the past..  He was last sober 9 months ago after rehab but he drank the day that he was released from rehab.  He now wants to be sober.  He states that he has been cutting back and has not drink for the past 8 days however his girlfriend states that he has been drinking.  He had 2 shots of vodka earlier today at 2 PM.  On arrival, he is tachycardic to the 140s and tremulous.  He is not hallucinating.  He is dry heaving.  CIWA is 14.  Alcohol level is undetectable.    IV was established, rally bag given.  2 mg of Ativan given as well.  Labs were obtained.  Mild leukocytosis 11,400.  I do not think that this represents infection.  He was hypokalemic with potassium 3.0.  He does have anion gap metabolic acidosis which is likely secondary to alcoholic versus starvation ketosis.  Has mild TRISTAN with creatinine 1.41.  Elevation in bilirubin is normal at 3.4 but he has no right upper quadrant tenderness palpation  to raise concern for cholangitis or other biliary pathology.    12:41 AM patient reevaluated.  He remains nauseated but is no longer dry heaving.  His heart rate has improved to the 110s.  I will order him more Ativan.  At this point, I do think that he needs to be hospitalized for alcohol withdrawal.  Patient voices understanding and is agreeable to plan.    I discussed with the hospitalist, Dr. Vazquez, who agreed to admit the patient to the hospital.      Hydration: Based on the patient's presentation of Tachycardia the patient was given IV fluids. IV Hydration was used because oral hydration was not adequate alone. Upon recheck following hydration, the patient was somewhat improved with a less fast heart rate.          ADDITIONAL PROBLEMS MANAGED  Hypokalemia -discussed repletion with hospitalist however she will give potassium containing IV fluids    DISPOSITION AND DISCUSSIONS  I have discussed management of the patient with the following physicians and TARAH's:    Hospitalist - Dr. Vazquez    Discussion of management with other QHP or appropriate source(s): None     Escalation of care considered, and ultimately not performed: None    Barriers to care at this time, including but not limited to:  None .     Decision tools and prescription drugs considered including, but not limited to:  None .    DISPOSITION:  Patient will be hospitalized by Dr. Vazquez, hospitalist, in guarded condition.      FINAL DIAGNOSIS  1. Alcohol withdrawal syndrome without complication (HCC)    2. Hypokalemia         Electronically signed by: Meagan Case M.D., 8/8/2024 11:24 PM

## 2024-08-09 NOTE — DIETARY
"Nutrition Services: Initial Assessment     Day 0 of admit. Raj Sharpe is a 44 y.o. male with admitting DX of Alcohol withdrawal with perceptual disturbances (HCC) [F10.932]     Consult received for MST score >/= 2.     Nutrition Assessment:      Height: 175.3 cm (5' 9\")  Weight: 60.3 kg (132 lb 15 oz)  Weight taken via bed scale  Body mass index is 19.63 kg/m². BMI classification: Normal.  Wt Readings from Last 6 Encounters:   08/09/24 60.3 kg (132 lb 15 oz)   01/17/24 66.8 kg (147 lb 3.2 oz)   11/22/23 69.4 kg (153 lb)   11/08/23 71.1 kg (156 lb 12 oz)   12/09/22 69.4 kg (153 lb)   06/24/22 68.9 kg (151 lb 14.4 oz)      Objective:  Pertinent medical hx:   Past Medical History:   Diagnosis Date    ETOH abuse     No pertinent past medical history      Nutrition support: Not indicated  Enteral access: not present  Pertinent labs:   Lab Results   Component Value Date/Time    SODIUM 137 08/08/2024 11:30 PM    POTASSIUM 3.0 (L) 08/08/2024 11:30 PM    CO2 17 (L) 08/08/2024 11:30 PM    CHLORIDE 80 (L) 08/08/2024 11:30 PM    BUN 11 08/08/2024 11:30 PM    CREATININE 1.41 (H) 08/08/2024 11:30 PM    CALCIUM 10.0 08/08/2024 11:30 PM    PHOSPHORUS 6.2 (H) 08/08/2024 11:30 PM    MAGNESIUM 1.6 08/08/2024 11:30 PM    GLUCOSE 98 08/08/2024 11:30 PM      Pertinent meds:   Scheduled Medications   Medication Dose Frequency    senna-docusate  2 Tablet BID    thiamine  100 mg DAILY    And    multivitamin  1 Tablet DAILY    And    folic acid  1 mg DAILY    nicotine  1 Patch Daily-0600       Last BM:  (prior to admit) per flowsheets     Current diet order:   Cardiac diet      Subjective:   Nurse told this RD that it was not a good time to visit patient.   Pt is currently on a cardiac diet. Based on pt's current dx and medical hx, liberalization of diet to regular is appropriate.   Pt also has hx of ETOH abuse with possible poor nutrition. He may benefit from Ensure Plus supplement BID.   Patient reported UBW: unknown "   Dietary recall/energy intake: PO intake was 25-50% at breakfast this morning. RD was not able to discuss PO hx PTA.     Nutrition Focused Physical Exam (NFPE)   Weight loss: Based on review of wt hx in Epic, pt with wt loss of 6.5 kg (9.7%) x 7 months Wt loss is not significant. .  Muscle mass: unknown  Subcutaneous fat: unknown  Fluid Accumulation: no edema noted.  Reduced  Strength: N/A in acute care setting.     Nutrition Diagnosis:      RD is not able to determine if malnutrition is present at this time.      Nutrition Interventions:      Liberalize diet from cardiac to regular.   Recommend Ensure Plus (provides 350 calories, 13 g protein per 8 fl oz) BID.  Patient aware of active plan of care as appropriate.     Nutrition Monitoring and Evaluation:     Monitor nutrition POC  Additional fluids per MD/DO  Monitor vital signs pertinent to nutrition       RD following and will provide updated recommendations as indicated.     Delphine Jovel R.D.

## 2024-08-09 NOTE — ASSESSMENT & PLAN NOTE
-Hyperbilirubinemia with initial total bilirubin of 3.4 that is likely secondary to alcohol use.  -Patient denies abdominal pain.  -Will repeat his bilirubin in the morning.

## 2024-08-09 NOTE — PROGRESS NOTES
4 Eyes Skin Assessment Completed by DAMARIS Metz and DAMARIS Cheek.    Head Redness  Ears WDL  Nose WDL  Mouth WDL  Neck WDL  Breast/Chest Bruising  Shoulder Blades WDL  Spine WDL  (R) Arm/Elbow/Hand WDL  (L) Arm/Elbow/Hand WDL  Abdomen WDL  Groin WDL  Scrotum/Coccyx/Buttocks WDL  (R) Leg WDL  (L) Leg WDL  (R) Heel/Foot/Toe WDL  (L) Heel/Foot/Toe Scab          Devices In Places Tele Box      Interventions In Place Pillows    Possible Skin Injury No    Pictures Uploaded Into Epic Yes  Wound Consult Placed N/A  RN Wound Prevention Protocol Ordered No

## 2024-08-09 NOTE — PROGRESS NOTES
Telemetry Shift Summary     Rhythm: ST/SR  Rate:   Measurements: .12/.08/.42  Ectopy (reported by Monitor Tech): none     Normal Values  Rhythm: Sinus  HR:   Measurements: 0.12-0.20/0.06-0.10/0.30-0.52

## 2024-08-09 NOTE — H&P
"Hospital Medicine History & Physical Note    Date of Service  8/9/2024    Primary Care Physician  Drew Daly D.O.    Consultants  None    Code Status  Full Code    Chief Complaint  Chief Complaint   Patient presents with    ETOH Withdrawal     Stopped drinking 1 week ago.. States \"states vomiting and feeling bad\". Last drink today \"2 shots\" states usually drink 1 liter vodka per day.       History of Presenting Illness  Raj Sharpe is a 44 y.o. male with history of EtOH abuse who reports drinking 1.5 L of vodka daily and multiple relapse after rehabilitation from EtOH, who presented to the emergency department on 8/8/2024 with symptoms of alcohol withdrawal.  Patient states that over the past week or so is starting to cut back on alcohol and today, has ongoing nausea and vomiting, feeling very unwell..    I discussed the plan of care with patient and ERP Dr. Case .    Review of Systems  Review of Systems   Constitutional:  Positive for malaise/fatigue. Negative for fever.   HENT:  Negative for congestion and sore throat.    Eyes:  Negative for blurred vision and double vision.   Respiratory:  Negative for cough and shortness of breath.    Cardiovascular:  Negative for chest pain and palpitations.   Gastrointestinal:  Positive for nausea and vomiting.   Genitourinary:  Negative for dysuria and urgency.   Musculoskeletal:  Negative for myalgias and neck pain.   Skin:  Negative for itching and rash.   Neurological:  Negative for dizziness, weakness and headaches.   Endo/Heme/Allergies:  Does not bruise/bleed easily.   Psychiatric/Behavioral:  Negative for depression. The patient is nervous/anxious. The patient does not have insomnia.        Past Medical History   has a past medical history of ETOH abuse and No pertinent past medical history.    Surgical History  Denies recent surgical history.    Family History  family history includes Hyperlipidemia in his father; No Known Problems in his " mother and sister.   Family history reviewed with patient. There is no family history that is pertinent to the chief complaint.     Social History   reports that he has been smoking cigarettes. He has a 15 pack-year smoking history. He has never used smokeless tobacco. He reports current alcohol use of about 1.2 oz of alcohol per week. He reports that he does not use drugs.    Allergies  No Known Allergies    Medications  Prior to Admission Medications   Prescriptions Last Dose Informant Patient Reported? Taking?   Cholecalciferol (VITAMIN D3) 1.25 MG (22282 UT) Cap   No No   Sig: Take 1 Capsule by mouth every 7 days.      Facility-Administered Medications: None       Physical Exam  Temp:  [37.1 °C (98.7 °F)] 37.1 °C (98.7 °F)  Pulse:  [135-141] 135  Resp:  [18-20] 20  BP: (136)/(92) 136/92  SpO2:  [95 %-98 %] 95 %  Blood Pressure: (!) 136/92   Temperature: 37.1 °C (98.7 °F)   Pulse: (!) 135   Respiration: 20   Pulse Oximetry: 95 %       Physical Exam  Constitutional:       Appearance: Normal appearance.   HENT:      Head: Normocephalic and atraumatic.      Nose: Nose normal.      Mouth/Throat:      Mouth: Mucous membranes are moist.      Pharynx: Oropharynx is clear.   Eyes:      General: Scleral icterus present.      Extraocular Movements: Extraocular movements intact.      Pupils: Pupils are equal, round, and reactive to light.   Cardiovascular:      Rate and Rhythm: Regular rhythm. Tachycardia present.      Pulses: Normal pulses.      Heart sounds: Normal heart sounds.   Pulmonary:      Effort: Pulmonary effort is normal.      Breath sounds: Normal breath sounds.   Abdominal:      General: Abdomen is flat. Bowel sounds are normal.      Palpations: Abdomen is soft.   Musculoskeletal:      Cervical back: Normal range of motion and neck supple.   Skin:     General: Skin is warm and dry.      Coloration: Skin is jaundiced.   Neurological:      General: No focal deficit present.      Mental Status: He is alert and  oriented to person, place, and time.   Psychiatric:         Mood and Affect: Mood normal.         Behavior: Behavior normal.         Laboratory:  Recent Labs     08/08/24  2330   WBC 11.4*   RBC 4.38*   HEMOGLOBIN 15.3   HEMATOCRIT 44.8   .3*   MCH 34.9*   MCHC 34.2   RDW 53.6*   PLATELETCT 164   MPV 10.9     Recent Labs     08/08/24  2330   SODIUM 137   POTASSIUM 3.0*   CHLORIDE 80*   CO2 17*   GLUCOSE 98   BUN 11   CREATININE 1.41*   CALCIUM 10.0     Recent Labs     08/08/24  2330   ALTSGPT 44   ASTSGOT 96*   ALKPHOSPHAT 264*   TBILIRUBIN 3.4*   LIPASE 32   GLUCOSE 98           no X-Ray or EKG requiring interpretation    Assessment/Plan:  Justification for Admission Status  I anticipate this patient is appropriate for observation status at this time because 44-year-old male, coming with alcohol withdrawal and TRISTAN      * Alcohol withdrawal with perceptual disturbances (HCC)- (present on admission)  Assessment & Plan  -Observation status on telemetry unit.  -Patient tachycardic and alcohol withdrawal also with TRISTAN.  -Patient received 2 mg of IV Ativan in the emergency department and I am starting him on CIWA protocol.  -He denies prior history of alcohol withdrawal seizures.  -Patient was given a rally bag in the emergency department.        TRISTAN (acute kidney injury) (HCC)  Assessment & Plan  -Secondary to ongoing nausea and vomiting as he is undergoing alcohol withdrawal.  -On admission, his creatinine is 1.41.  -He received a rally bag in the emergency department, I will continue with NS with 20 mEq of potassium overnight and recheck chemistry panel in the morning.    Hyperbilirubinemia  Assessment & Plan  -Hyperbilirubinemia with initial total bilirubin of 3.4 that is likely secondary to alcohol use.  -Patient denies abdominal pain.  -Will repeat his bilirubin in the morning.    Encounter for smoking cessation counseling  Assessment & Plan  -Patient reports smoking 1-1/2 cigarettes a day.  He does  considers quit smoking but feels like at this point his priority is to focus on quit drinking.  Patient considers nicotine patch. For Smoking cessation efficacy, varenicline (Chantix) for smoking in combination with Naltrexone (for ETOH abuse) may be a good combination for him to consider. I encouraged him to look into both smoking and ETOH cessation strategies    Total amount of time dedicated to was 14 min    Leukocytosis  Assessment & Plan  -WBC 11.4 on admission.  This is likely reactive secondary to above.  Monitor CBC in the morning.    Hypokalemia  Assessment & Plan  -Potassium is 3.0 on admission.  Since his creatinine is also 1.41 and I am starting him on IV fluids, will add 20 mEq of potassium.  -Recheck potassium in the morning.        VTE prophylaxis: SCDs/TEDs

## 2024-08-09 NOTE — ASSESSMENT & PLAN NOTE
-Secondary to ongoing nausea and vomiting as he is undergoing alcohol withdrawal.  -On admission, his creatinine is 1.41.  -He received a rally bag in the emergency department, I will continue with NS with 20 mEq of potassium overnight and recheck chemistry panel in the morning.

## 2024-08-09 NOTE — PROGRESS NOTES
Patient brought to the unit on a gurney from the ER. Patient's significant other, Sarah, bedside. Patient's vitals are stable and reports no pain. Patient's scored a CIWA score of 9 and medication was administered according to medication parameters. Seizure precautions in place.

## 2024-08-09 NOTE — CARE PLAN
The patient is Stable - Low risk of patient condition declining or worsening    Shift Goals  Clinical Goals: safety; CIWA; monitor vitals and labs  Patient Goals: rest and comfort    Progress made toward(s) clinical / shift goals: Patient is progressing towards goals of ETOH cessation and expresses desire to cease ETOH consumption, as well as nicotine.      Problem: Optimal Care for Alcohol Withdrawal  Goal: Optimal Care for the alcohol withdrawal patient  Outcome: Progressing  Note: Patient agrees to interventions, medical and non-medical.        Patient is not progressing towards the following goals:      Problem: Knowledge Deficit - Standard  Goal: Patient and family/care givers will demonstrate understanding of plan of care, disease process/condition, diagnostic tests and medications  Outcome: Not Progressing  Note: Patient exhibiting confusion and disorientation. Patient's family expresses understanding of plan of care.

## 2024-08-09 NOTE — ASSESSMENT & PLAN NOTE
-Potassium is 3.0 on admission.  Since his creatinine is also 1.41 and I am starting him on IV fluids, will add 20 mEq of potassium.  -Recheck potassium in the morning.

## 2024-08-10 VITALS
WEIGHT: 132.94 LBS | HEART RATE: 96 BPM | BODY MASS INDEX: 19.69 KG/M2 | RESPIRATION RATE: 17 BRPM | SYSTOLIC BLOOD PRESSURE: 115 MMHG | HEIGHT: 69 IN | DIASTOLIC BLOOD PRESSURE: 80 MMHG | TEMPERATURE: 98.5 F | OXYGEN SATURATION: 98 %

## 2024-08-10 LAB
ALBUMIN SERPL BCP-MCNC: 3.3 G/DL (ref 3.2–4.9)
ALBUMIN/GLOB SERPL: 1.6 G/DL
ALP SERPL-CCNC: 165 U/L (ref 30–99)
ALT SERPL-CCNC: 30 U/L (ref 2–50)
ANION GAP SERPL CALC-SCNC: 8 MMOL/L (ref 7–16)
AST SERPL-CCNC: 84 U/L (ref 12–45)
BILIRUB SERPL-MCNC: 1.8 MG/DL (ref 0.1–1.5)
BUN SERPL-MCNC: 21 MG/DL (ref 8–22)
CALCIUM ALBUM COR SERPL-MCNC: 9 MG/DL (ref 8.5–10.5)
CALCIUM SERPL-MCNC: 8.4 MG/DL (ref 8.4–10.2)
CHLORIDE SERPL-SCNC: 100 MMOL/L (ref 96–112)
CO2 SERPL-SCNC: 29 MMOL/L (ref 20–33)
CREAT SERPL-MCNC: 0.8 MG/DL (ref 0.5–1.4)
ERYTHROCYTE [DISTWIDTH] IN BLOOD BY AUTOMATED COUNT: 51.7 FL (ref 35.9–50)
GFR SERPLBLD CREATININE-BSD FMLA CKD-EPI: 112 ML/MIN/1.73 M 2
GLOBULIN SER CALC-MCNC: 2.1 G/DL (ref 1.9–3.5)
GLUCOSE SERPL-MCNC: 88 MG/DL (ref 65–99)
HCT VFR BLD AUTO: 32.8 % (ref 42–52)
HCT VFR BLD AUTO: 34.5 % (ref 42–52)
HGB BLD-MCNC: 11.6 G/DL (ref 14–18)
HGB BLD-MCNC: 11.7 G/DL (ref 14–18)
MAGNESIUM SERPL-MCNC: 2.3 MG/DL (ref 1.5–2.5)
MCH RBC QN AUTO: 35.2 PG (ref 27–33)
MCHC RBC AUTO-ENTMCNC: 33.9 G/DL (ref 32.3–36.5)
MCV RBC AUTO: 103.9 FL (ref 81.4–97.8)
PHOSPHATE SERPL-MCNC: 1.6 MG/DL (ref 2.5–4.5)
PLATELET # BLD AUTO: 112 K/UL (ref 164–446)
PLATELETS.RETICULATED NFR BLD AUTO: 11.5 % (ref 0.6–13.1)
PMV BLD AUTO: 11.3 FL (ref 9–12.9)
POTASSIUM SERPL-SCNC: 3.6 MMOL/L (ref 3.6–5.5)
PROT SERPL-MCNC: 5.4 G/DL (ref 6–8.2)
RBC # BLD AUTO: 3.32 M/UL (ref 4.7–6.1)
SODIUM SERPL-SCNC: 137 MMOL/L (ref 135–145)
WBC # BLD AUTO: 7.1 K/UL (ref 4.8–10.8)

## 2024-08-10 PROCEDURE — 700102 HCHG RX REV CODE 250 W/ 637 OVERRIDE(OP): Performed by: HOSPITALIST

## 2024-08-10 PROCEDURE — RXMED WILLOW AMBULATORY MEDICATION CHARGE: Performed by: INTERNAL MEDICINE

## 2024-08-10 PROCEDURE — 83735 ASSAY OF MAGNESIUM: CPT

## 2024-08-10 PROCEDURE — 85014 HEMATOCRIT: CPT

## 2024-08-10 PROCEDURE — A9270 NON-COVERED ITEM OR SERVICE: HCPCS | Performed by: HOSPITALIST

## 2024-08-10 PROCEDURE — 36415 COLL VENOUS BLD VENIPUNCTURE: CPT

## 2024-08-10 PROCEDURE — 700102 HCHG RX REV CODE 250 W/ 637 OVERRIDE(OP): Performed by: INTERNAL MEDICINE

## 2024-08-10 PROCEDURE — 85055 RETICULATED PLATELET ASSAY: CPT

## 2024-08-10 PROCEDURE — A9270 NON-COVERED ITEM OR SERVICE: HCPCS | Performed by: INTERNAL MEDICINE

## 2024-08-10 PROCEDURE — 85027 COMPLETE CBC AUTOMATED: CPT

## 2024-08-10 PROCEDURE — 85018 HEMOGLOBIN: CPT

## 2024-08-10 PROCEDURE — 99239 HOSP IP/OBS DSCHRG MGMT >30: CPT | Performed by: INTERNAL MEDICINE

## 2024-08-10 PROCEDURE — 84100 ASSAY OF PHOSPHORUS: CPT

## 2024-08-10 PROCEDURE — 700101 HCHG RX REV CODE 250: Performed by: HOSPITALIST

## 2024-08-10 PROCEDURE — 80053 COMPREHEN METABOLIC PANEL: CPT

## 2024-08-10 RX ORDER — CHLORDIAZEPOXIDE HYDROCHLORIDE 10 MG/1
CAPSULE, GELATIN COATED ORAL
Qty: 6 CAPSULE | Refills: 0 | Status: SHIPPED | OUTPATIENT
Start: 2024-08-10 | End: 2024-08-18

## 2024-08-10 RX ADMIN — LORAZEPAM 1 MG: 1 TABLET ORAL at 04:56

## 2024-08-10 RX ADMIN — NICOTINE TRANSDERMAL SYSTEM 21 MG: 21 PATCH, EXTENDED RELEASE TRANSDERMAL at 04:56

## 2024-08-10 RX ADMIN — POTASSIUM CHLORIDE AND SODIUM CHLORIDE: 900; 150 INJECTION, SOLUTION INTRAVENOUS at 07:58

## 2024-08-10 RX ADMIN — MULTIVITAMIN TABLET 1 TABLET: TABLET at 04:56

## 2024-08-10 RX ADMIN — SENNOSIDES AND DOCUSATE SODIUM 2 TABLET: 50; 8.6 TABLET ORAL at 04:55

## 2024-08-10 RX ADMIN — Medication 100 MG: at 04:55

## 2024-08-10 RX ADMIN — DIBASIC SODIUM PHOSPHATE, MONOBASIC POTASSIUM PHOSPHATE AND MONOBASIC SODIUM PHOSPHATE 500 MG: 852; 155; 130 TABLET ORAL at 07:58

## 2024-08-10 RX ADMIN — FOLIC ACID 1 MG: 1 TABLET ORAL at 04:55

## 2024-08-10 RX ADMIN — LORAZEPAM 0.5 MG: 0.5 TABLET ORAL at 09:46

## 2024-08-10 ASSESSMENT — LIFESTYLE VARIABLES
TREMOR: TREMOR NOT VISIBLE BUT CAN BE FELT, FINGERTIP TO FINGERTIP
ORIENTATION AND CLOUDING OF SENSORIUM: CANNOT DO SERIAL ADDITIONS OR IS UNCERTAIN ABOUT DATE
TOTAL SCORE: 4
NAUSEA AND VOMITING: NO NAUSEA AND NO VOMITING
PAROXYSMAL SWEATS: *
PAROXYSMAL SWEATS: *
NAUSEA AND VOMITING: NO NAUSEA AND NO VOMITING
NAUSEA AND VOMITING: NO NAUSEA AND NO VOMITING
AUDITORY DISTURBANCES: NOT PRESENT
AUDITORY DISTURBANCES: NOT PRESENT
ANXIETY: NO ANXIETY (AT EASE)
TREMOR: *
VISUAL DISTURBANCES: NOT PRESENT
AGITATION: *
ORIENTATION AND CLOUDING OF SENSORIUM: ORIENTED AND CAN DO SERIAL ADDITIONS
TOTAL SCORE: 7
VISUAL DISTURBANCES: NOT PRESENT
HEADACHE, FULLNESS IN HEAD: NOT PRESENT
PAROXYSMAL SWEATS: NO SWEAT VISIBLE
ANXIETY: NO ANXIETY (AT EASE)
AGITATION: *
TOTAL SCORE: 9
AGITATION: NORMAL ACTIVITY
VISUAL DISTURBANCES: NOT PRESENT
HEADACHE, FULLNESS IN HEAD: NOT PRESENT
ANXIETY: *
HEADACHE, FULLNESS IN HEAD: NOT PRESENT
TREMOR: *
ORIENTATION AND CLOUDING OF SENSORIUM: CANNOT DO SERIAL ADDITIONS OR IS UNCERTAIN ABOUT DATE
AUDITORY DISTURBANCES: NOT PRESENT

## 2024-08-10 ASSESSMENT — PAIN DESCRIPTION - PAIN TYPE: TYPE: ACUTE PAIN

## 2024-08-10 NOTE — PROGRESS NOTES
1300: Discharge order placed by provider. Discharge instructions reviewed with patient at bedside, patient verbalized understanding of instructions. Patient discharged to home with his parents.

## 2024-08-10 NOTE — CARE PLAN
The patient is Watcher - Medium risk of patient condition declining or worsening    Shift Goals  Clinical Goals: CIWA, fall risk, monitor mental status  Patient Goals: go home    Progress made toward(s) clinical / shift goals:  CIWA continued, bed alarm and strip alarm activated, patient A&Ox3 with int confusion.       Problem: Optimal Care for Alcohol Withdrawal  Goal: Optimal Care for the alcohol withdrawal patient  Outcome: Progressing     Problem: Provide Safe Environment  Goal: Suicide environmental safety, protocols, policies, and practices will be implemented  Outcome: Progressing     Problem: Seizure Precautions  Goal: Implementation of seizure precautions  Outcome: Progressing       Patient is not progressing towards the following goals:

## 2024-08-10 NOTE — PROGRESS NOTES
Telemetry Shift Summary    Rhythm SR  HR Range   Ectopy none  Measurements 0.14/0.08/0.42      Normal Values  Rhythm SR  HR Range:   Measurements: 0.12-0.20/0.06-0.10/0.30-0.52

## 2024-08-10 NOTE — PROGRESS NOTES
Telemetry Shift Summary     Rhythm: SR  Rate: 69-75  Measurements: .16/.08/.42  Ectopy (reported by Monitor Tech): none     Normal Values  Rhythm: Sinus  HR:   Measurements: 0.12-0.20/0.06-0.10/0.30-0.52

## 2024-08-10 NOTE — CARE PLAN
The patient is Stable - Low risk of patient condition declining or worsening    Shift Goals  Clinical Goals: CIWA; safety; monitor vitals and labs  Patient Goals: comfort; get better    Progress made toward(s) clinical / shift goals: Patient is progressing towards goals and participates in care.     Problem: Lifestyle Changes  Goal: Patient's ability to identify lifestyle changes and available resources to help reduce recurrence of condition will improve  Outcome: Progressing  Note: Patient expresses desire to participate in smoking cessation and alcohol cessation.      Problem: Fall Risk  Goal: Patient will remain free from falls  Outcome: Progressing  Note: Fall precautions are in place. Patient is reoriented often.      Patient is not progressing towards the following goals:

## 2024-08-10 NOTE — DISCHARGE SUMMARY
"Discharge Summary    CHIEF COMPLAINT ON ADMISSION  Chief Complaint   Patient presents with    ETOH Withdrawal     Stopped drinking 1 week ago.. States \"states vomiting and feeling bad\". Last drink today \"2 shots\" states usually drink 1 liter vodka per day.       Reason for Admission  Vomiting     Admission Date  8/8/2024    CODE STATUS  Full Code    HPI & HOSPITAL COURSE  This is a 44 y.o. male with a history of alcohol abuse with multiple admissions to rehab in the past status post relapse who presented after trying to stop alcohol with shakiness, nausea and vomiting.  He was treated with aggressive IV fluids and IV Ativan per [facility] protocol.  He had some electrolyte abnormalities including hypokalemia and hypophosphatemia which were replaced.  He was noted to have macrocytosis and his hemoglobin was dropped from 15-11 with IV hydration however he had no evidence of bleeding therefore this thought to be secondary to dilution.    His Ativan requirements decreased, his nausea resolved and he was able to tolerate a diet.  He was determined stable for discharge.  He will discharge in stable condition to continue pursuing support to maintain abstinence from alcohol    Therefore, he is discharged in fair and stable condition to home with close outpatient follow-up.    He made MRI with any specialist for this and his initial presentation    Discharge Date  8/10/2024    FOLLOW UP ITEMS POST DISCHARGE  Follow-up    DISCHARGE DIAGNOSES  Principal Problem:    Alcohol withdrawal with perceptual disturbances (HCC) (POA: Yes)  Active Problems:    TRISTAN (acute kidney injury) (HCC) (POA: Unknown)    Hypokalemia (POA: Unknown)    Leukocytosis (POA: Unknown)    Hyperbilirubinemia (POA: Unknown)    Encounter for smoking cessation counseling (POA: Unknown)  Resolved Problems:    * No resolved hospital problems. *      FOLLOW UP  Follow-up with PCP    MEDICATIONS ON DISCHARGE     Medication List        START taking these medications       "  Instructions   chlordiazepoxide 10 MG capsule  Commonly known as: Librium   Doctor's comments: Take 1 tab 3 times daily for 1 day  1 tab twice daily for 1 day  1 tab on day 3  Take 1 Capsule by mouth 3 times a day as needed for Anxiety for up to 3 days.  Dose: 10 mg     phosphorus 250 MG tablet  Commonly known as: K-Phos-Neutral   Take 2 Tablets by mouth 2 times a day for 2 days.  Dose: 2 Tablet            CONTINUE taking these medications        Instructions   Vitamin D3 1.25 MG (22317 UT) Caps   Take 1 Capsule by mouth every 7 days.  Dose: 1 Capsule              Allergies  No Known Allergies    DIET  Orders Placed This Encounter   Procedures    Diet Order Diet: Regular     Standing Status:   Standing     Number of Occurrences:   1     Order Specific Question:   Diet:     Answer:   Regular [1]       ACTIVITY  As tolerated.  Weight bearing as tolerated    CONSULTATIONS  None    PROCEDURES  None    LABORATORY  Lab Results   Component Value Date    SODIUM 137 08/10/2024    POTASSIUM 3.6 08/10/2024    CHLORIDE 100 08/10/2024    CO2 29 08/10/2024    GLUCOSE 88 08/10/2024    BUN 21 08/10/2024    CREATININE 0.80 08/10/2024        Lab Results   Component Value Date    WBC 7.1 08/10/2024    HEMOGLOBIN 11.6 (L) 08/10/2024    HEMATOCRIT 32.8 (L) 08/10/2024    PLATELETCT 112 (L) 08/10/2024        Total time of the discharge process exceeds 39 minutes.

## 2024-08-10 NOTE — PROGRESS NOTES
Patient's sister, Reny, called to get updates on patient. Patient gave permission to communicate with his sister. Patient spoke to Reny on speaker phone after getting update from the nurse.

## 2024-08-10 NOTE — CARE PLAN
The patient is Stable - Low risk of patient condition declining or worsening    Shift Goals  Clinical Goals: CIWA, safety,  Patient Goals: Discharge home    Progress made toward(s) clinical / shift goals: Patient's CIWA score improved from the last assessment. Discharge order placed by provider.       Problem: Knowledge Deficit - Standard  Goal: Patient and family/care givers will demonstrate understanding of plan of care, disease process/condition, diagnostic tests and medications  Outcome: Progressing     Problem: Optimal Care for Alcohol Withdrawal  Goal: Optimal Care for the alcohol withdrawal patient  Outcome: Progressing     Problem: Provide Safe Environment  Goal: Suicide environmental safety, protocols, policies, and practices will be implemented  Outcome: Progressing     Problem: Fall Risk  Goal: Patient will remain free from falls  Outcome: Progressing

## 2024-08-10 NOTE — PROGRESS NOTES
Lab called regarding a drop in Hgb from 15.3 (admission) to 11.7 (this am). Lab results read back to . Notified MD Lista of change. New orders in place.

## 2024-08-15 ENCOUNTER — PHARMACY VISIT (OUTPATIENT)
Dept: PHARMACY | Facility: MEDICAL CENTER | Age: 44
End: 2024-08-15
Payer: MEDICARE

## 2024-11-03 ENCOUNTER — APPOINTMENT (OUTPATIENT)
Dept: RADIOLOGY | Facility: MEDICAL CENTER | Age: 44
DRG: 897 | End: 2024-11-03
Attending: STUDENT IN AN ORGANIZED HEALTH CARE EDUCATION/TRAINING PROGRAM
Payer: MEDICAID

## 2024-11-03 ENCOUNTER — HOSPITAL ENCOUNTER (INPATIENT)
Facility: MEDICAL CENTER | Age: 44
LOS: 7 days | DRG: 897 | End: 2024-11-11
Attending: STUDENT IN AN ORGANIZED HEALTH CARE EDUCATION/TRAINING PROGRAM
Payer: MEDICAID

## 2024-11-03 DIAGNOSIS — R41.82 ALTERED MENTAL STATUS, UNSPECIFIED ALTERED MENTAL STATUS TYPE: Primary | ICD-10-CM

## 2024-11-03 DIAGNOSIS — E87.6 HYPOKALEMIA: ICD-10-CM

## 2024-11-03 DIAGNOSIS — F10.90 ALCOHOL USE DISORDER: ICD-10-CM

## 2024-11-03 LAB
ALBUMIN SERPL BCP-MCNC: 4.1 G/DL (ref 3.2–4.9)
ALBUMIN/GLOB SERPL: 1.3 G/DL
ALP SERPL-CCNC: 318 U/L (ref 30–99)
ALT SERPL-CCNC: 58 U/L (ref 2–50)
AMMONIA PLAS-SCNC: 21 UMOL/L (ref 11–45)
AMPHET UR QL SCN: NEGATIVE
ANION GAP SERPL CALC-SCNC: 22 MMOL/L (ref 7–16)
APPEARANCE UR: CLEAR
AST SERPL-CCNC: 124 U/L (ref 12–45)
BACTERIA #/AREA URNS HPF: ABNORMAL /HPF
BARBITURATES UR QL SCN: NEGATIVE
BASOPHILS # BLD AUTO: 0 % (ref 0–1.8)
BASOPHILS # BLD: 0 K/UL (ref 0–0.12)
BENZODIAZ UR QL SCN: NEGATIVE
BILIRUB SERPL-MCNC: 1.6 MG/DL (ref 0.1–1.5)
BILIRUB UR QL STRIP.AUTO: ABNORMAL
BUN SERPL-MCNC: 31 MG/DL (ref 8–22)
BZE UR QL SCN: NEGATIVE
CALCIUM ALBUM COR SERPL-MCNC: 10.3 MG/DL (ref 8.5–10.5)
CALCIUM SERPL-MCNC: 10.4 MG/DL (ref 8.5–10.5)
CANNABINOIDS UR QL SCN: NEGATIVE
CASTS URNS QL MICRO: ABNORMAL /LPF (ref 0–2)
CHLORIDE SERPL-SCNC: 89 MMOL/L (ref 96–112)
CK SERPL-CCNC: 929 U/L (ref 0–154)
CO2 SERPL-SCNC: 26 MMOL/L (ref 20–33)
COLOR UR: ABNORMAL
CREAT SERPL-MCNC: 0.86 MG/DL (ref 0.5–1.4)
EOSINOPHIL # BLD AUTO: 0 K/UL (ref 0–0.51)
EOSINOPHIL NFR BLD: 0 % (ref 0–6.9)
EPITHELIAL CELLS 1715: ABNORMAL /HPF (ref 0–5)
ERYTHROCYTE [DISTWIDTH] IN BLOOD BY AUTOMATED COUNT: 47.8 FL (ref 35.9–50)
ETHANOL BLD-MCNC: <10.1 MG/DL
FENTANYL UR QL: NEGATIVE
FLUAV RNA SPEC QL NAA+PROBE: NEGATIVE
FLUBV RNA SPEC QL NAA+PROBE: NEGATIVE
GFR SERPLBLD CREATININE-BSD FMLA CKD-EPI: 110 ML/MIN/1.73 M 2
GLOBULIN SER CALC-MCNC: 3.1 G/DL (ref 1.9–3.5)
GLUCOSE BLD STRIP.AUTO-MCNC: 396 MG/DL (ref 65–99)
GLUCOSE SERPL-MCNC: 143 MG/DL (ref 65–99)
GLUCOSE UR STRIP.AUTO-MCNC: NEGATIVE MG/DL
HCT VFR BLD AUTO: 35.6 % (ref 42–52)
HGB BLD-MCNC: 12.9 G/DL (ref 14–18)
KETONES UR STRIP.AUTO-MCNC: ABNORMAL MG/DL
LACTATE SERPL-SCNC: 4.3 MMOL/L (ref 0.5–2)
LEUKOCYTE ESTERASE UR QL STRIP.AUTO: ABNORMAL
LIPASE SERPL-CCNC: 79 U/L (ref 11–82)
LYMPHOCYTES # BLD AUTO: 1.01 K/UL (ref 1–4.8)
LYMPHOCYTES NFR BLD: 6 % (ref 22–41)
MAGNESIUM SERPL-MCNC: 2.4 MG/DL (ref 1.5–2.5)
MANUAL DIFF BLD: NORMAL
MCH RBC QN AUTO: 34.6 PG (ref 27–33)
MCHC RBC AUTO-ENTMCNC: 36.2 G/DL (ref 32.3–36.5)
MCV RBC AUTO: 95.4 FL (ref 81.4–97.8)
METHADONE UR QL SCN: NEGATIVE
MICRO URNS: ABNORMAL
MONOCYTES # BLD AUTO: 0 K/UL (ref 0–0.85)
MONOCYTES NFR BLD AUTO: 0 % (ref 0–13.4)
MORPHOLOGY BLD-IMP: NORMAL
MUCOUS THREADS URNS QL MICRO: PRESENT /HPF
NEUTROPHILS # BLD AUTO: 15.79 K/UL (ref 1.82–7.42)
NEUTROPHILS NFR BLD: 94 % (ref 44–72)
NITRITE UR QL STRIP.AUTO: POSITIVE
NRBC # BLD AUTO: 0.02 K/UL
NRBC BLD-RTO: 0.1 /100 WBC (ref 0–0.2)
OPIATES UR QL SCN: NEGATIVE
OVALOCYTES BLD QL SMEAR: NORMAL
OXYCODONE UR QL SCN: NEGATIVE
PCP UR QL SCN: NEGATIVE
PH UR STRIP.AUTO: 7 [PH] (ref 5–8)
PHOSPHATE SERPL-MCNC: 4.3 MG/DL (ref 2.5–4.5)
PLATELET BLD QL SMEAR: NORMAL
PLATELETS.RETICULATED NFR BLD AUTO: 13.4 % (ref 0.6–13.1)
PMV BLD AUTO: 11 FL (ref 9–12.9)
POIKILOCYTOSIS BLD QL SMEAR: NORMAL
POTASSIUM SERPL-SCNC: 2.2 MMOL/L (ref 3.6–5.5)
PROPOXYPH UR QL SCN: NEGATIVE
PROT SERPL-MCNC: 7.2 G/DL (ref 6–8.2)
PROT UR QL STRIP: 30 MG/DL
RBC # BLD AUTO: 3.73 M/UL (ref 4.7–6.1)
RBC # URNS HPF: ABNORMAL /HPF (ref 0–2)
RBC BLD AUTO: PRESENT
RBC UR QL AUTO: NEGATIVE
RSV RNA SPEC QL NAA+PROBE: NEGATIVE
SARS-COV-2 RNA RESP QL NAA+PROBE: NOTDETECTED
SODIUM SERPL-SCNC: 137 MMOL/L (ref 135–145)
SP GR UR STRIP.AUTO: 1.03
SPECIMEN SOURCE: NORMAL
TARGETS BLD QL SMEAR: NORMAL
UROBILINOGEN UR STRIP.AUTO-MCNC: 4 EU/DL
WBC # BLD AUTO: 16.8 K/UL (ref 4.8–10.8)
WBC #/AREA URNS HPF: ABNORMAL /HPF

## 2024-11-03 PROCEDURE — 85055 RETICULATED PLATELET ASSAY: CPT

## 2024-11-03 PROCEDURE — 82962 GLUCOSE BLOOD TEST: CPT

## 2024-11-03 PROCEDURE — 96366 THER/PROPH/DIAG IV INF ADDON: CPT

## 2024-11-03 PROCEDURE — 82550 ASSAY OF CK (CPK): CPT

## 2024-11-03 PROCEDURE — 71045 X-RAY EXAM CHEST 1 VIEW: CPT

## 2024-11-03 PROCEDURE — 87040 BLOOD CULTURE FOR BACTERIA: CPT

## 2024-11-03 PROCEDURE — 70450 CT HEAD/BRAIN W/O DYE: CPT

## 2024-11-03 PROCEDURE — 93005 ELECTROCARDIOGRAM TRACING: CPT | Performed by: STUDENT IN AN ORGANIZED HEALTH CARE EDUCATION/TRAINING PROGRAM

## 2024-11-03 PROCEDURE — 83690 ASSAY OF LIPASE: CPT

## 2024-11-03 PROCEDURE — 82077 ASSAY SPEC XCP UR&BREATH IA: CPT

## 2024-11-03 PROCEDURE — 81001 URINALYSIS AUTO W/SCOPE: CPT

## 2024-11-03 PROCEDURE — 85027 COMPLETE CBC AUTOMATED: CPT

## 2024-11-03 PROCEDURE — 83605 ASSAY OF LACTIC ACID: CPT

## 2024-11-03 PROCEDURE — 87086 URINE CULTURE/COLONY COUNT: CPT

## 2024-11-03 PROCEDURE — 84100 ASSAY OF PHOSPHORUS: CPT

## 2024-11-03 PROCEDURE — 96365 THER/PROPH/DIAG IV INF INIT: CPT

## 2024-11-03 PROCEDURE — 83735 ASSAY OF MAGNESIUM: CPT

## 2024-11-03 PROCEDURE — 700105 HCHG RX REV CODE 258: Performed by: STUDENT IN AN ORGANIZED HEALTH CARE EDUCATION/TRAINING PROGRAM

## 2024-11-03 PROCEDURE — 81015 MICROSCOPIC EXAM OF URINE: CPT

## 2024-11-03 PROCEDURE — 85610 PROTHROMBIN TIME: CPT

## 2024-11-03 PROCEDURE — 80053 COMPREHEN METABOLIC PANEL: CPT

## 2024-11-03 PROCEDURE — 80307 DRUG TEST PRSMV CHEM ANLYZR: CPT

## 2024-11-03 PROCEDURE — 81003 URINALYSIS AUTO W/O SCOPE: CPT

## 2024-11-03 PROCEDURE — 36415 COLL VENOUS BLD VENIPUNCTURE: CPT

## 2024-11-03 PROCEDURE — 0241U HCHG SARS-COV-2 COVID-19 NFCT DS RESP RNA 4 TRGT MIC: CPT

## 2024-11-03 PROCEDURE — 82140 ASSAY OF AMMONIA: CPT

## 2024-11-03 PROCEDURE — 96368 THER/DIAG CONCURRENT INF: CPT

## 2024-11-03 PROCEDURE — 85007 BL SMEAR W/DIFF WBC COUNT: CPT

## 2024-11-03 PROCEDURE — 700111 HCHG RX REV CODE 636 W/ 250 OVERRIDE (IP): Mod: JZ | Performed by: STUDENT IN AN ORGANIZED HEALTH CARE EDUCATION/TRAINING PROGRAM

## 2024-11-03 PROCEDURE — 700101 HCHG RX REV CODE 250: Performed by: STUDENT IN AN ORGANIZED HEALTH CARE EDUCATION/TRAINING PROGRAM

## 2024-11-03 PROCEDURE — 99291 CRITICAL CARE FIRST HOUR: CPT

## 2024-11-03 PROCEDURE — 96375 TX/PRO/DX INJ NEW DRUG ADDON: CPT

## 2024-11-03 RX ORDER — POTASSIUM CHLORIDE 7.45 MG/ML
10 INJECTION INTRAVENOUS
Status: DISCONTINUED | OUTPATIENT
Start: 2024-11-04 | End: 2024-11-04

## 2024-11-03 RX ORDER — LORAZEPAM 2 MG/ML
1 INJECTION INTRAMUSCULAR ONCE
Status: COMPLETED | OUTPATIENT
Start: 2024-11-03 | End: 2024-11-03

## 2024-11-03 RX ORDER — THIAMINE HYDROCHLORIDE 100 MG/ML
200 INJECTION, SOLUTION INTRAMUSCULAR; INTRAVENOUS ONCE
Status: COMPLETED | OUTPATIENT
Start: 2024-11-03 | End: 2024-11-03

## 2024-11-03 RX ORDER — CEFTRIAXONE 2 G/1
2000 INJECTION, POWDER, FOR SOLUTION INTRAMUSCULAR; INTRAVENOUS ONCE
Status: COMPLETED | OUTPATIENT
Start: 2024-11-03 | End: 2024-11-03

## 2024-11-03 RX ADMIN — THIAMINE HYDROCHLORIDE 200 MG: 100 INJECTION, SOLUTION INTRAMUSCULAR; INTRAVENOUS at 23:09

## 2024-11-03 RX ADMIN — CEFTRIAXONE SODIUM 2000 MG: 2 INJECTION, POWDER, FOR SOLUTION INTRAMUSCULAR; INTRAVENOUS at 23:12

## 2024-11-03 RX ADMIN — FOLIC ACID: 5 INJECTION, SOLUTION INTRAMUSCULAR; INTRAVENOUS; SUBCUTANEOUS at 22:23

## 2024-11-03 RX ADMIN — THIAMINE HYDROCHLORIDE 200 MG: 100 INJECTION, SOLUTION INTRAMUSCULAR; INTRAVENOUS at 23:07

## 2024-11-03 RX ADMIN — LORAZEPAM 1 MG: 2 INJECTION INTRAMUSCULAR; INTRAVENOUS at 22:40

## 2024-11-03 RX ADMIN — POTASSIUM CHLORIDE 10 MEQ: 7.46 INJECTION, SOLUTION INTRAVENOUS at 23:45

## 2024-11-03 ASSESSMENT — FIBROSIS 4 INDEX: FIB4 SCORE: 5.89

## 2024-11-04 PROBLEM — D64.9 ANEMIA: Status: ACTIVE | Noted: 2024-11-04

## 2024-11-04 PROBLEM — R94.31 PROLONGED QT INTERVAL: Status: ACTIVE | Noted: 2024-11-04

## 2024-11-04 PROBLEM — Z78.9 ALCOHOL USE: Status: ACTIVE | Noted: 2024-11-04

## 2024-11-04 PROBLEM — E87.20 LACTIC ACIDOSIS: Status: ACTIVE | Noted: 2024-11-04

## 2024-11-04 PROBLEM — Z72.0 TOBACCO USE: Status: ACTIVE | Noted: 2024-11-04

## 2024-11-04 PROBLEM — R74.01 TRANSAMINITIS: Status: ACTIVE | Noted: 2024-11-04

## 2024-11-04 PROBLEM — R41.82 ALTERED MENTAL STATUS: Status: ACTIVE | Noted: 2024-11-04

## 2024-11-04 LAB
ALBUMIN SERPL BCP-MCNC: 3.1 G/DL (ref 3.2–4.9)
ALBUMIN/GLOB SERPL: 1.3 G/DL
ALP SERPL-CCNC: 245 U/L (ref 30–99)
ALT SERPL-CCNC: 41 U/L (ref 2–50)
AMMONIA PLAS-SCNC: 19 UMOL/L (ref 11–45)
ANION GAP SERPL CALC-SCNC: 15 MMOL/L (ref 7–16)
ANION GAP SERPL CALC-SCNC: 17 MMOL/L (ref 7–16)
APAP SERPL-MCNC: <5 UG/ML (ref 10–30)
AST SERPL-CCNC: 74 U/L (ref 12–45)
BASE EXCESS BLDA CALC-SCNC: 2 MMOL/L (ref -4–3)
BASE EXCESS BLDV CALC-SCNC: 3 MMOL/L
BASOPHILS # BLD AUTO: 0.2 % (ref 0–1.8)
BASOPHILS # BLD AUTO: 0.2 % (ref 0–1.8)
BASOPHILS # BLD: 0.03 K/UL (ref 0–0.12)
BASOPHILS # BLD: 0.03 K/UL (ref 0–0.12)
BILIRUB SERPL-MCNC: 0.9 MG/DL (ref 0.1–1.5)
BODY TEMPERATURE: 32.4 CENTIGRADE
BODY TEMPERATURE: ABNORMAL DEGREES
BUN SERPL-MCNC: 20 MG/DL (ref 8–22)
BUN SERPL-MCNC: 26 MG/DL (ref 8–22)
CALCIUM ALBUM COR SERPL-MCNC: 9.2 MG/DL (ref 8.5–10.5)
CALCIUM SERPL-MCNC: 8.5 MG/DL (ref 8.5–10.5)
CALCIUM SERPL-MCNC: 9 MG/DL (ref 8.5–10.5)
CHLORIDE SERPL-SCNC: 98 MMOL/L (ref 96–112)
CHLORIDE SERPL-SCNC: 99 MMOL/L (ref 96–112)
CO2 BLDA-SCNC: 26 MMOL/L (ref 20–33)
CO2 SERPL-SCNC: 22 MMOL/L (ref 20–33)
CO2 SERPL-SCNC: 24 MMOL/L (ref 20–33)
CREAT SERPL-MCNC: 0.63 MG/DL (ref 0.5–1.4)
CREAT SERPL-MCNC: 0.72 MG/DL (ref 0.5–1.4)
DELSYS IDSYS: ABNORMAL
EKG IMPRESSION: NORMAL
EOSINOPHIL # BLD AUTO: 0 K/UL (ref 0–0.51)
EOSINOPHIL # BLD AUTO: 0.02 K/UL (ref 0–0.51)
EOSINOPHIL NFR BLD: 0 % (ref 0–6.9)
EOSINOPHIL NFR BLD: 0.1 % (ref 0–6.9)
ERYTHROCYTE [DISTWIDTH] IN BLOOD BY AUTOMATED COUNT: 46.3 FL (ref 35.9–50)
ERYTHROCYTE [DISTWIDTH] IN BLOOD BY AUTOMATED COUNT: 48.7 FL (ref 35.9–50)
GFR SERPLBLD CREATININE-BSD FMLA CKD-EPI: 116 ML/MIN/1.73 M 2
GFR SERPLBLD CREATININE-BSD FMLA CKD-EPI: 121 ML/MIN/1.73 M 2
GLOBULIN SER CALC-MCNC: 2.4 G/DL (ref 1.9–3.5)
GLUCOSE BLD STRIP.AUTO-MCNC: 101 MG/DL (ref 65–99)
GLUCOSE BLD STRIP.AUTO-MCNC: 101 MG/DL (ref 65–99)
GLUCOSE BLD STRIP.AUTO-MCNC: 127 MG/DL (ref 65–99)
GLUCOSE BLD STRIP.AUTO-MCNC: 143 MG/DL (ref 65–99)
GLUCOSE BLD STRIP.AUTO-MCNC: 166 MG/DL (ref 65–99)
GLUCOSE BLD STRIP.AUTO-MCNC: 172 MG/DL (ref 65–99)
GLUCOSE BLD STRIP.AUTO-MCNC: 177 MG/DL (ref 65–99)
GLUCOSE BLD STRIP.AUTO-MCNC: 209 MG/DL (ref 65–99)
GLUCOSE BLD STRIP.AUTO-MCNC: 255 MG/DL (ref 65–99)
GLUCOSE BLD STRIP.AUTO-MCNC: 26 MG/DL (ref 65–99)
GLUCOSE BLD STRIP.AUTO-MCNC: 48 MG/DL (ref 65–99)
GLUCOSE BLD STRIP.AUTO-MCNC: 70 MG/DL (ref 65–99)
GLUCOSE BLD STRIP.AUTO-MCNC: 74 MG/DL (ref 65–99)
GLUCOSE BLD STRIP.AUTO-MCNC: 83 MG/DL (ref 65–99)
GLUCOSE BLD STRIP.AUTO-MCNC: 84 MG/DL (ref 65–99)
GLUCOSE BLD STRIP.AUTO-MCNC: 87 MG/DL (ref 65–99)
GLUCOSE BLD STRIP.AUTO-MCNC: 90 MG/DL (ref 65–99)
GLUCOSE SERPL-MCNC: 38 MG/DL (ref 65–99)
GLUCOSE SERPL-MCNC: 69 MG/DL (ref 65–99)
HCO3 BLDA-SCNC: 25.4 MMOL/L (ref 17–25)
HCO3 BLDV-SCNC: 27 MMOL/L (ref 24–28)
HCT VFR BLD AUTO: 28.6 % (ref 42–52)
HCT VFR BLD AUTO: 30.5 % (ref 42–52)
HGB BLD-MCNC: 10 G/DL (ref 14–18)
HGB BLD-MCNC: 10.8 G/DL (ref 14–18)
IMM GRANULOCYTES # BLD AUTO: 0.07 K/UL (ref 0–0.11)
IMM GRANULOCYTES # BLD AUTO: 0.16 K/UL (ref 0–0.11)
IMM GRANULOCYTES NFR BLD AUTO: 0.5 % (ref 0–0.9)
IMM GRANULOCYTES NFR BLD AUTO: 0.9 % (ref 0–0.9)
INHALED O2 FLOW RATE: 100 L/MIN
INR PPP: 0.99 (ref 0.87–1.13)
LACTATE BLD-SCNC: 1.4 MMOL/L (ref 0.5–2)
LACTATE SERPL-SCNC: 2.4 MMOL/L (ref 0.5–2)
LACTATE SERPL-SCNC: 2.8 MMOL/L (ref 0.5–2)
LACTATE SERPL-SCNC: 4.1 MMOL/L (ref 0.5–2)
LACTATE SERPL-SCNC: 4.3 MMOL/L (ref 0.5–2)
LACTATE SERPL-SCNC: 5.1 MMOL/L (ref 0.5–2)
LPM ILPM: 2 LPM
LYMPHOCYTES # BLD AUTO: 1.06 K/UL (ref 1–4.8)
LYMPHOCYTES # BLD AUTO: 1.6 K/UL (ref 1–4.8)
LYMPHOCYTES NFR BLD: 7.2 % (ref 22–41)
LYMPHOCYTES NFR BLD: 8.9 % (ref 22–41)
MAGNESIUM SERPL-MCNC: 2.3 MG/DL (ref 1.5–2.5)
MAGNESIUM SERPL-MCNC: 2.4 MG/DL (ref 1.5–2.5)
MCH RBC QN AUTO: 33.8 PG (ref 27–33)
MCH RBC QN AUTO: 34 PG (ref 27–33)
MCHC RBC AUTO-ENTMCNC: 35 G/DL (ref 32.3–36.5)
MCHC RBC AUTO-ENTMCNC: 35.4 G/DL (ref 32.3–36.5)
MCV RBC AUTO: 95.3 FL (ref 81.4–97.8)
MCV RBC AUTO: 97.3 FL (ref 81.4–97.8)
MONOCYTES # BLD AUTO: 0.21 K/UL (ref 0–0.85)
MONOCYTES # BLD AUTO: 0.89 K/UL (ref 0–0.85)
MONOCYTES NFR BLD AUTO: 1.4 % (ref 0–13.4)
MONOCYTES NFR BLD AUTO: 5 % (ref 0–13.4)
NEUTROPHILS # BLD AUTO: 13.3 K/UL (ref 1.82–7.42)
NEUTROPHILS # BLD AUTO: 15.23 K/UL (ref 1.82–7.42)
NEUTROPHILS NFR BLD: 84.9 % (ref 44–72)
NEUTROPHILS NFR BLD: 90.7 % (ref 44–72)
NRBC # BLD AUTO: 0 K/UL
NRBC # BLD AUTO: 0.03 K/UL
NRBC BLD-RTO: 0 /100 WBC (ref 0–0.2)
NRBC BLD-RTO: 0.2 /100 WBC (ref 0–0.2)
PCO2 BLDA: 35.2 MMHG (ref 26–37)
PCO2 BLDV: 39.9 MMHG (ref 41–51)
PCO2 TEMP ADJ BLDA: 32 MMHG (ref 26–37)
PCO2 TEMP ADJ BLDV: 32.6 MMHG (ref 41–51)
PH BLDA: 7.46 [PH] (ref 7.4–7.5)
PH BLDV: 7.45 [PH] (ref 7.31–7.45)
PH TEMP ADJ BLDA: 7.5 [PH] (ref 7.4–7.5)
PH TEMP ADJ BLDV: 7.51 [PH] (ref 7.31–7.45)
PHOSPHATE SERPL-MCNC: 4.8 MG/DL (ref 2.5–4.5)
PHOSPHATE SERPL-MCNC: 5.2 MG/DL (ref 2.5–4.5)
PLATELET # BLD AUTO: 151 K/UL (ref 164–446)
PLATELET # BLD AUTO: 170 K/UL (ref 164–446)
PLATELET # BLD AUTO: 173 K/UL (ref 164–446)
PMV BLD AUTO: 11.1 FL (ref 9–12.9)
PMV BLD AUTO: 11.7 FL (ref 9–12.9)
PO2 BLDA: 150 MMHG (ref 64–87)
PO2 BLDV: 46 MMHG (ref 25–40)
PO2 TEMP ADJ BLDA: 137 MMHG (ref 64–87)
PO2 TEMP ADJ BLDV: 33.3 MMHG (ref 25–40)
POTASSIUM SERPL-SCNC: 2.4 MMOL/L (ref 3.6–5.5)
POTASSIUM SERPL-SCNC: 2.9 MMOL/L (ref 3.6–5.5)
POTASSIUM SERPL-SCNC: 3.4 MMOL/L (ref 3.6–5.5)
POTASSIUM SERPL-SCNC: 3.4 MMOL/L (ref 3.6–5.5)
PROT SERPL-MCNC: 5.5 G/DL (ref 6–8.2)
PROTHROMBIN TIME: 13.1 SEC (ref 12–14.6)
RBC # BLD AUTO: 2.94 M/UL (ref 4.7–6.1)
RBC # BLD AUTO: 3.2 M/UL (ref 4.7–6.1)
SALICYLATES SERPL-MCNC: <1 MG/DL (ref 15–25)
SAO2 % BLDA: 99 % (ref 93–99)
SAO2 % BLDV: 71.1 %
SODIUM SERPL-SCNC: 135 MMOL/L (ref 135–145)
SODIUM SERPL-SCNC: 140 MMOL/L (ref 135–145)
SPECIMEN DRAWN FROM PATIENT: ABNORMAL
VIT B12 SERPL-MCNC: 2912 PG/ML (ref 211–911)
WBC # BLD AUTO: 14.7 K/UL (ref 4.8–10.8)
WBC # BLD AUTO: 17.9 K/UL (ref 4.8–10.8)

## 2024-11-04 PROCEDURE — 700101 HCHG RX REV CODE 250

## 2024-11-04 PROCEDURE — 80053 COMPREHEN METABOLIC PANEL: CPT

## 2024-11-04 PROCEDURE — 97602 WOUND(S) CARE NON-SELECTIVE: CPT

## 2024-11-04 PROCEDURE — 36600 WITHDRAWAL OF ARTERIAL BLOOD: CPT

## 2024-11-04 PROCEDURE — 85025 COMPLETE CBC W/AUTO DIFF WBC: CPT

## 2024-11-04 PROCEDURE — 84132 ASSAY OF SERUM POTASSIUM: CPT

## 2024-11-04 PROCEDURE — 82962 GLUCOSE BLOOD TEST: CPT | Mod: 91

## 2024-11-04 PROCEDURE — 700111 HCHG RX REV CODE 636 W/ 250 OVERRIDE (IP): Performed by: STUDENT IN AN ORGANIZED HEALTH CARE EDUCATION/TRAINING PROGRAM

## 2024-11-04 PROCEDURE — 82607 VITAMIN B-12: CPT

## 2024-11-04 PROCEDURE — 80179 DRUG ASSAY SALICYLATE: CPT

## 2024-11-04 PROCEDURE — 36415 COLL VENOUS BLD VENIPUNCTURE: CPT

## 2024-11-04 PROCEDURE — 700102 HCHG RX REV CODE 250 W/ 637 OVERRIDE(OP): Mod: JZ | Performed by: STUDENT IN AN ORGANIZED HEALTH CARE EDUCATION/TRAINING PROGRAM

## 2024-11-04 PROCEDURE — 96367 TX/PROPH/DG ADDL SEQ IV INF: CPT

## 2024-11-04 PROCEDURE — 302009 SKINTEGRITY WOUND CLEANSER: Performed by: STUDENT IN AN ORGANIZED HEALTH CARE EDUCATION/TRAINING PROGRAM

## 2024-11-04 PROCEDURE — 82803 BLOOD GASES ANY COMBINATION: CPT

## 2024-11-04 PROCEDURE — 84100 ASSAY OF PHOSPHORUS: CPT

## 2024-11-04 PROCEDURE — 700111 HCHG RX REV CODE 636 W/ 250 OVERRIDE (IP)

## 2024-11-04 PROCEDURE — 51798 US URINE CAPACITY MEASURE: CPT

## 2024-11-04 PROCEDURE — 700111 HCHG RX REV CODE 636 W/ 250 OVERRIDE (IP): Performed by: HOSPITALIST

## 2024-11-04 PROCEDURE — 700105 HCHG RX REV CODE 258: Performed by: STUDENT IN AN ORGANIZED HEALTH CARE EDUCATION/TRAINING PROGRAM

## 2024-11-04 PROCEDURE — A9270 NON-COVERED ITEM OR SERVICE: HCPCS | Mod: JZ | Performed by: STUDENT IN AN ORGANIZED HEALTH CARE EDUCATION/TRAINING PROGRAM

## 2024-11-04 PROCEDURE — 700105 HCHG RX REV CODE 258: Performed by: HOSPITALIST

## 2024-11-04 PROCEDURE — 83735 ASSAY OF MAGNESIUM: CPT | Mod: 91

## 2024-11-04 PROCEDURE — 770020 HCHG ROOM/CARE - TELE (206)

## 2024-11-04 PROCEDURE — HZ2ZZZZ DETOXIFICATION SERVICES FOR SUBSTANCE ABUSE TREATMENT: ICD-10-PCS

## 2024-11-04 PROCEDURE — 80143 DRUG ASSAY ACETAMINOPHEN: CPT

## 2024-11-04 PROCEDURE — 99233 SBSQ HOSP IP/OBS HIGH 50: CPT

## 2024-11-04 PROCEDURE — 700105 HCHG RX REV CODE 258

## 2024-11-04 PROCEDURE — 83605 ASSAY OF LACTIC ACID: CPT

## 2024-11-04 PROCEDURE — 82140 ASSAY OF AMMONIA: CPT

## 2024-11-04 PROCEDURE — 80048 BASIC METABOLIC PNL TOTAL CA: CPT

## 2024-11-04 RX ORDER — LORAZEPAM 2 MG/ML
1.5 INJECTION INTRAMUSCULAR
Status: DISCONTINUED | OUTPATIENT
Start: 2024-11-04 | End: 2024-11-11

## 2024-11-04 RX ORDER — FOLIC ACID 1 MG/1
1 TABLET ORAL DAILY
Status: DISCONTINUED | OUTPATIENT
Start: 2024-11-05 | End: 2024-11-05

## 2024-11-04 RX ORDER — GAUZE BANDAGE 2" X 2"
100 BANDAGE TOPICAL DAILY
Status: DISCONTINUED | OUTPATIENT
Start: 2024-11-05 | End: 2024-11-05

## 2024-11-04 RX ORDER — MAGNESIUM SULFATE HEPTAHYDRATE 40 MG/ML
2 INJECTION, SOLUTION INTRAVENOUS ONCE
Status: COMPLETED | OUTPATIENT
Start: 2024-11-04 | End: 2024-11-04

## 2024-11-04 RX ORDER — LORAZEPAM 2 MG/ML
1 INJECTION INTRAMUSCULAR
Status: DISCONTINUED | OUTPATIENT
Start: 2024-11-04 | End: 2024-11-11

## 2024-11-04 RX ORDER — DEXTROSE AND SODIUM CHLORIDE 10; .45 G/100ML; G/100ML
INJECTION, SOLUTION INTRAVENOUS CONTINUOUS
Status: DISCONTINUED | OUTPATIENT
Start: 2024-11-04 | End: 2024-11-04

## 2024-11-04 RX ORDER — LORAZEPAM 2 MG/1
4 TABLET ORAL
Status: DISCONTINUED | OUTPATIENT
Start: 2024-11-04 | End: 2024-11-11

## 2024-11-04 RX ORDER — LORAZEPAM 2 MG/ML
2 INJECTION INTRAMUSCULAR
Status: DISCONTINUED | OUTPATIENT
Start: 2024-11-04 | End: 2024-11-11

## 2024-11-04 RX ORDER — POTASSIUM CHLORIDE 7.45 MG/ML
10 INJECTION INTRAVENOUS
Status: DISPENSED | OUTPATIENT
Start: 2024-11-04 | End: 2024-11-04

## 2024-11-04 RX ORDER — LORAZEPAM 1 MG/1
1 TABLET ORAL EVERY 4 HOURS PRN
Status: DISCONTINUED | OUTPATIENT
Start: 2024-11-04 | End: 2024-11-11

## 2024-11-04 RX ORDER — ENOXAPARIN SODIUM 100 MG/ML
40 INJECTION SUBCUTANEOUS DAILY
Status: DISCONTINUED | OUTPATIENT
Start: 2024-11-04 | End: 2024-11-11 | Stop reason: HOSPADM

## 2024-11-04 RX ORDER — NICOTINE 21 MG/24HR
21 PATCH, TRANSDERMAL 24 HOURS TRANSDERMAL
Status: DISCONTINUED | OUTPATIENT
Start: 2024-11-04 | End: 2024-11-11 | Stop reason: HOSPADM

## 2024-11-04 RX ORDER — LORAZEPAM 2 MG/1
2 TABLET ORAL
Status: DISCONTINUED | OUTPATIENT
Start: 2024-11-04 | End: 2024-11-11

## 2024-11-04 RX ORDER — POTASSIUM CHLORIDE 7.45 MG/ML
10 INJECTION INTRAVENOUS
Status: DISCONTINUED | OUTPATIENT
Start: 2024-11-04 | End: 2024-11-04

## 2024-11-04 RX ORDER — DEXTROSE MONOHYDRATE 25 G/50ML
25 INJECTION, SOLUTION INTRAVENOUS
Status: DISCONTINUED | OUTPATIENT
Start: 2024-11-04 | End: 2024-11-04

## 2024-11-04 RX ORDER — NICOTINE 21 MG/24HR
21 PATCH, TRANSDERMAL 24 HOURS TRANSDERMAL
Status: DISCONTINUED | OUTPATIENT
Start: 2024-11-05 | End: 2024-11-04

## 2024-11-04 RX ORDER — DEXTROSE MONOHYDRATE 25 G/50ML
INJECTION, SOLUTION INTRAVENOUS
Status: COMPLETED
Start: 2024-11-04 | End: 2024-11-04

## 2024-11-04 RX ORDER — LORAZEPAM 1 MG/1
0.5 TABLET ORAL EVERY 4 HOURS PRN
Status: DISCONTINUED | OUTPATIENT
Start: 2024-11-04 | End: 2024-11-11

## 2024-11-04 RX ORDER — LORAZEPAM 2 MG/ML
0.5 INJECTION INTRAMUSCULAR EVERY 4 HOURS PRN
Status: DISCONTINUED | OUTPATIENT
Start: 2024-11-04 | End: 2024-11-11

## 2024-11-04 RX ORDER — DEXTROSE MONOHYDRATE 50 MG/ML
INJECTION, SOLUTION INTRAVENOUS CONTINUOUS
Status: DISCONTINUED | OUTPATIENT
Start: 2024-11-04 | End: 2024-11-04

## 2024-11-04 RX ORDER — POTASSIUM CHLORIDE 7.45 MG/ML
10 INJECTION INTRAVENOUS
Status: COMPLETED | OUTPATIENT
Start: 2024-11-04 | End: 2024-11-04

## 2024-11-04 RX ORDER — POTASSIUM CHLORIDE 1500 MG/1
40 TABLET, EXTENDED RELEASE ORAL EVERY 4 HOURS
Status: COMPLETED | OUTPATIENT
Start: 2024-11-04 | End: 2024-11-05

## 2024-11-04 RX ORDER — SODIUM CHLORIDE 9 MG/ML
1000 INJECTION, SOLUTION INTRAVENOUS ONCE
Status: COMPLETED | OUTPATIENT
Start: 2024-11-04 | End: 2024-11-04

## 2024-11-04 RX ORDER — DEXTROSE MONOHYDRATE 25 G/50ML
25 INJECTION, SOLUTION INTRAVENOUS
Status: DISCONTINUED | OUTPATIENT
Start: 2024-11-04 | End: 2024-11-11 | Stop reason: HOSPADM

## 2024-11-04 RX ADMIN — DEXTROSE MONOHYDRATE: 50 INJECTION, SOLUTION INTRAVENOUS at 05:34

## 2024-11-04 RX ADMIN — NICOTINE TRANSDERMAL SYSTEM 21 MG: 21 PATCH, EXTENDED RELEASE TRANSDERMAL at 19:52

## 2024-11-04 RX ADMIN — DEXTROSE MONOHYDRATE 25 G: 25 INJECTION, SOLUTION INTRAVENOUS at 03:49

## 2024-11-04 RX ADMIN — SODIUM CHLORIDE 1000 ML: 9 INJECTION, SOLUTION INTRAVENOUS at 01:14

## 2024-11-04 RX ADMIN — DEXTROSE MONOHYDRATE 25 G: 25 INJECTION, SOLUTION INTRAVENOUS at 08:14

## 2024-11-04 RX ADMIN — DEXTROSE MONOHYDRATE 25 G: 25 INJECTION, SOLUTION INTRAVENOUS at 13:01

## 2024-11-04 RX ADMIN — DEXTROSE MONOHYDRATE 25 G: 25 INJECTION, SOLUTION INTRAVENOUS at 11:23

## 2024-11-04 RX ADMIN — POTASSIUM CHLORIDE 10 MEQ: 7.46 INJECTION, SOLUTION INTRAVENOUS at 02:10

## 2024-11-04 RX ADMIN — ENOXAPARIN SODIUM 40 MG: 100 INJECTION SUBCUTANEOUS at 18:15

## 2024-11-04 RX ADMIN — POTASSIUM CHLORIDE 10 MEQ: 7.46 INJECTION, SOLUTION INTRAVENOUS at 10:42

## 2024-11-04 RX ADMIN — DEXTROSE AND SODIUM CHLORIDE: 10; .45 INJECTION, SOLUTION INTRAVENOUS at 13:44

## 2024-11-04 RX ADMIN — POTASSIUM CHLORIDE 10 MEQ: 7.46 INJECTION, SOLUTION INTRAVENOUS at 05:47

## 2024-11-04 RX ADMIN — POTASSIUM CHLORIDE 10 MEQ: 7.46 INJECTION, SOLUTION INTRAVENOUS at 01:11

## 2024-11-04 RX ADMIN — ENOXAPARIN SODIUM 40 MG: 100 INJECTION SUBCUTANEOUS at 02:11

## 2024-11-04 RX ADMIN — DEXTROSE MONOHYDRATE 25 G: 25 INJECTION, SOLUTION INTRAVENOUS at 05:23

## 2024-11-04 RX ADMIN — MAGNESIUM SULFATE HEPTAHYDRATE 2 G: 2 INJECTION, SOLUTION INTRAVENOUS at 19:56

## 2024-11-04 RX ADMIN — THIAMINE HYDROCHLORIDE 500 MG: 100 INJECTION, SOLUTION INTRAMUSCULAR; INTRAVENOUS at 22:52

## 2024-11-04 RX ADMIN — POTASSIUM CHLORIDE 10 MEQ: 7.46 INJECTION, SOLUTION INTRAVENOUS at 04:31

## 2024-11-04 RX ADMIN — THIAMINE HYDROCHLORIDE 500 MG: 100 INJECTION, SOLUTION INTRAMUSCULAR; INTRAVENOUS at 08:10

## 2024-11-04 RX ADMIN — DEXTROSE MONOHYDRATE 25 G: 25 INJECTION, SOLUTION INTRAVENOUS at 09:50

## 2024-11-04 RX ADMIN — THIAMINE HYDROCHLORIDE 500 MG: 100 INJECTION, SOLUTION INTRAMUSCULAR; INTRAVENOUS at 15:12

## 2024-11-04 RX ADMIN — POTASSIUM CHLORIDE 40 MEQ: 1500 TABLET, EXTENDED RELEASE ORAL at 21:39

## 2024-11-04 RX ADMIN — POTASSIUM CHLORIDE 10 MEQ: 7.46 INJECTION, SOLUTION INTRAVENOUS at 03:13

## 2024-11-04 RX ADMIN — POTASSIUM CHLORIDE 10 MEQ: 7.46 INJECTION, SOLUTION INTRAVENOUS at 12:16

## 2024-11-04 RX ADMIN — POTASSIUM CHLORIDE 10 MEQ: 7.46 INJECTION, SOLUTION INTRAVENOUS at 09:40

## 2024-11-04 ASSESSMENT — LIFESTYLE VARIABLES
PAROXYSMAL SWEATS: NO SWEAT VISIBLE
AUDITORY DISTURBANCES: NOT PRESENT
HEADACHE, FULLNESS IN HEAD: NOT PRESENT
HAVE PEOPLE ANNOYED YOU BY CRITICIZING YOUR DRINKING: YES
EVER HAD A DRINK FIRST THING IN THE MORNING TO STEADY YOUR NERVES TO GET RID OF A HANGOVER: YES
NAUSEA AND VOMITING: NO NAUSEA AND NO VOMITING
NAUSEA AND VOMITING: NO NAUSEA AND NO VOMITING
DOES PATIENT WANT TO TALK TO SOMEONE ABOUT QUITTING: YES
TOTAL SCORE: 1
ANXIETY: NO ANXIETY (AT EASE)
ORIENTATION AND CLOUDING OF SENSORIUM: CANNOT DO SERIAL ADDITIONS OR IS UNCERTAIN ABOUT DATE
VISUAL DISTURBANCES: NOT PRESENT
TOTAL SCORE: 4
EVER FELT BAD OR GUILTY ABOUT YOUR DRINKING: YES
NAUSEA AND VOMITING: NO NAUSEA AND NO VOMITING
TOTAL SCORE: 4
PAROXYSMAL SWEATS: NO SWEAT VISIBLE
HEADACHE, FULLNESS IN HEAD: NOT PRESENT
HEADACHE, FULLNESS IN HEAD: NOT PRESENT
AGITATION: NORMAL ACTIVITY
DOES PATIENT WANT TO STOP DRINKING: YES
ANXIETY: NO ANXIETY (AT EASE)
ANXIETY: NO ANXIETY (AT EASE)
ALCOHOL_USE: YES
VISUAL DISTURBANCES: NOT PRESENT
AUDITORY DISTURBANCES: NOT PRESENT
TREMOR: NO TREMOR
HOW MANY TIMES IN THE PAST YEAR HAVE YOU HAD 5 OR MORE DRINKS IN A DAY: 365
AUDITORY DISTURBANCES: NOT PRESENT
ON A TYPICAL DAY WHEN YOU DRINK ALCOHOL HOW MANY DRINKS DO YOU HAVE: 10
AVERAGE NUMBER OF DAYS PER WEEK YOU HAVE A DRINK CONTAINING ALCOHOL: 7
TOTAL SCORE: 1
TREMOR: NO TREMOR
ORIENTATION AND CLOUDING OF SENSORIUM: DISORIENTED FOR PLACE AND / OR PERSON
TREMOR: NO TREMOR
HAVE YOU EVER FELT YOU SHOULD CUT DOWN ON YOUR DRINKING: YES
AGITATION: NORMAL ACTIVITY
TOTAL SCORE: 4
ORIENTATION AND CLOUDING OF SENSORIUM: CANNOT DO SERIAL ADDITIONS OR IS UNCERTAIN ABOUT DATE
AGITATION: NORMAL ACTIVITY
TOTAL SCORE: 4
PAROXYSMAL SWEATS: NO SWEAT VISIBLE
VISUAL DISTURBANCES: NOT PRESENT
CONSUMPTION TOTAL: POSITIVE

## 2024-11-04 ASSESSMENT — PAIN DESCRIPTION - PAIN TYPE
TYPE: ACUTE PAIN

## 2024-11-04 ASSESSMENT — COGNITIVE AND FUNCTIONAL STATUS - GENERAL
TURNING FROM BACK TO SIDE WHILE IN FLAT BAD: A LITTLE
SUGGESTED CMS G CODE MODIFIER MOBILITY: CL
CLIMB 3 TO 5 STEPS WITH RAILING: TOTAL
PERSONAL GROOMING: A LITTLE
DRESSING REGULAR UPPER BODY CLOTHING: A LOT
MOVING FROM LYING ON BACK TO SITTING ON SIDE OF FLAT BED: A LITTLE
STANDING UP FROM CHAIR USING ARMS: A LOT
HELP NEEDED FOR BATHING: A LOT
SUGGESTED CMS G CODE MODIFIER DAILY ACTIVITY: CK
MOBILITY SCORE: 12
DAILY ACTIVITIY SCORE: 16
MOVING TO AND FROM BED TO CHAIR: A LOT
DRESSING REGULAR LOWER BODY CLOTHING: A LITTLE
WALKING IN HOSPITAL ROOM: TOTAL
TOILETING: A LOT

## 2024-11-04 ASSESSMENT — SOCIAL DETERMINANTS OF HEALTH (SDOH)
WITHIN THE LAST YEAR, HAVE YOU BEEN KICKED, HIT, SLAPPED, OR OTHERWISE PHYSICALLY HURT BY YOUR PARTNER OR EX-PARTNER?: NO
WITHIN THE PAST 12 MONTHS, THE FOOD YOU BOUGHT JUST DIDN'T LAST AND YOU DIDN'T HAVE MONEY TO GET MORE: NEVER TRUE
WITHIN THE LAST YEAR, HAVE YOU BEEN AFRAID OF YOUR PARTNER OR EX-PARTNER?: NO
WITHIN THE LAST YEAR, HAVE TO BEEN RAPED OR FORCED TO HAVE ANY KIND OF SEXUAL ACTIVITY BY YOUR PARTNER OR EX-PARTNER?: NO
WITHIN THE LAST YEAR, HAVE YOU BEEN HUMILIATED OR EMOTIONALLY ABUSED IN OTHER WAYS BY YOUR PARTNER OR EX-PARTNER?: NO
WITHIN THE PAST 12 MONTHS, YOU WORRIED THAT YOUR FOOD WOULD RUN OUT BEFORE YOU GOT THE MONEY TO BUY MORE: NEVER TRUE
IN THE PAST 12 MONTHS, HAS THE ELECTRIC, GAS, OIL, OR WATER COMPANY THREATENED TO SHUT OFF SERVICE IN YOUR HOME?: NO

## 2024-11-04 ASSESSMENT — FIBROSIS 4 INDEX
FIB4 SCORE: 2.87
FIB4 SCORE: 4.12

## 2024-11-04 NOTE — PROGRESS NOTES
Update provided to friend Jennifer over phone. Jennifer to call back tomorrow to talk to pt. Expressed concerns over pt going back to drinking after hospital stay. Writer reassured her that staff will advocate for what is best for patient and will encourage his full recovery, however pt may choose to leave AMA and that staff cannot hold him in the hospital if he chooses to do so. Jennifer understandable with same.

## 2024-11-04 NOTE — H&P
[facility] History & Physical Note    Date of Service  11/4/2024    Attending: Tyrese Guerrero D.o.  Senior Resident: Dr. Ap Castro DO  Contact Number: [phone]    Primary Care Physician  Drew Daly D.O.    Consultants  critical care      Code Status  Full Code    Chief Complaint  Chief Complaint   Patient presents with    ALOC     BIBA pt was found down on the floor in his bathroom today by family checking on him. He lives alone, LKW this Thursday. Pt altered, GCS 11, some bruising to torso, no other obvious signs of trauma. NSR, 158BGL PTA. Pt has a hx of EtOH abuse.        History of Presenting Illness (HPI):   Raj Sharpe is a 44 y.o. male with history of significant alcohol abuse who presented 11/3/2024 with altered mental status.  Girlfriend is coming bedside who is able to provide much of the history.  Patient was last noted to be normal this past Thursday.  His girlfriend unfortunately left town, found him down in the bathroom and minimally responsive.  Therefore EMS was called and patient was brought into the ED.  Per history, patient was having difficulty walking as he was unsteady on his feet, and also complained of double vision around 2 weeks ago.  He has been drinking about 1.5 L of vodka on a daily basis.  He has had multiple admissions in the past requiring aggressive CIWA protocol measures.  ED course, vital signs are stable patient afebrile saturating adequate on RA.  Remarkable labs include leukocytosis of 16.8 with neutrophilia, H&H of 12.9 and 35.6 respectively, potassium of 2.2, chloride of 89, anion gap of 22 (bicarb WNL), lactic acid of 4.3 (recheck 4.3), , , , ALT 58, alk phos 318.  Alcohol levels negative, UDS also negative.  UA demonstrating moderate bilirubin, nitrate positivity, small leukocyte esterase, WBC 0-2, RBC 3-5, urine casts 3-5.  COVID/flu/RSV was negative. VBG 7.45/39.9/46.  CT head was without acute abnormality,  CXR also without acute abnormality.  EKG demonstrating NSR with prolonged QTc of 543.  Received 500 mg of IV thiamine, 1 rally bag, 1 L NS, 1 mg IV Ativan, and ceftriaxone in ED.  Patient will be admitted to IMCU for encephalopathy with severe hypokalemia.    I discussed the plan of care with family.    Review of Systems  Review of Systems   Unable to perform ROS: Mental acuity       Past Medical History   has a past medical history of ETOH abuse and No pertinent past medical history.    Surgical History   has no past surgical history on file.     Family History  family history includes Hyperlipidemia in his father; No Known Problems in his mother and sister.   Family history reviewed with patient.     Social History  Tobacco: 1 PPD smoker x 30 years  Alcohol: Drinks 1.5 L of liquor daily  Recreational drugs (illegal or prescription): None  Employment: Manager at [employer]  Living Situation: Lives with girlfriend in [place]  Recent Travel: None      Allergies  No Known Allergies    Medications  None       Physical Exam  Pulse:  [56-72] 72  Resp:  [14-23] 23  BP: (101-120)/(74-82) 120/82  SpO2:  [97 %-100 %] 100 %  Blood Pressure: 120/82       Pulse: 72   Respiration: (!) 23   Pulse Oximetry: 100 %       Physical Exam  Constitutional:       Appearance: He is ill-appearing.   HENT:      Head: Normocephalic and atraumatic.      Nose: Nose normal.      Mouth/Throat:      Mouth: Mucous membranes are moist.   Eyes:      General: No scleral icterus.     Conjunctiva/sclera: Conjunctivae normal.      Pupils: Pupils are equal, round, and reactive to light.      Comments: Unable to assess for abdominal plegia due to lethargy   Cardiovascular:      Rate and Rhythm: Normal rate and regular rhythm.      Pulses: Normal pulses.      Heart sounds: Normal heart sounds. No murmur heard.     No friction rub. No gallop.   Pulmonary:      Effort: Pulmonary effort is normal.      Breath sounds: Normal breath sounds. No wheezing, rhonchi or  "rales.   Musculoskeletal:      Right lower leg: No edema.      Left lower leg: No edema.   Skin:     Capillary Refill: Capillary refill takes less than 2 seconds.   Neurological:      Mental Status: He is alert.      Comments: Significantly lethargic with spontaneous movements of all extremities.  Asterixis noted in bilateral feet         Laboratory:  Recent Labs     11/03/24 2115 11/04/24  0053   WBC 16.8* 14.7*   RBC 3.73* 3.20*   HEMOGLOBIN 12.9* 10.8*   HEMATOCRIT 35.6* 30.5*   MCV 95.4 95.3   MCH 34.6* 33.8*   MCHC 36.2 35.4   RDW 47.8 46.3   PLATELETCT  --  170   MPV 11.0 11.1     Recent Labs     11/03/24 2115   SODIUM 137   POTASSIUM 2.2*   CHLORIDE 89*   CO2 26   GLUCOSE 143*   BUN 31*   CREATININE 0.86   CALCIUM 10.4     Recent Labs     11/03/24 2115   ALTSGPT 58*   ASTSGOT 124*   ALKPHOSPHAT 318*   TBILIRUBIN 1.6*   LIPASE 79   GLUCOSE 143*     Recent Labs     11/03/24 2115   INR 0.99     No results for input(s): \"NTPROBNP\" in the last 72 hours.      No results for input(s): \"TROPONINT\" in the last 72 hours.    Imaging:  CT-HEAD W/O   Final Result      1.  No evidence of acute intracranial process.      2.  Atrophy.               DX-CHEST-PORTABLE (1 VIEW)   Final Result      No evidence of acute cardiopulmonary process.      US-RUQ    (Results Pending)       X-Ray:  I have personally reviewed the images and compared with prior images.  EKG:  I have personally reviewed the images and compared with prior images.    Assessment/Plan:  Problem Representation:   I anticipate this patient will require at least two midnights for appropriate medical management, necessitating inpatient admission because AMS and severe hypokalemia    Patient will need a Intermediate Care (Adult and Pediatrics) bed on MEDICAL service .  The need is secondary to AMS with severe hypokalemia.    * Altered mental status- (present on admission)  Assessment & Plan  Presenting ED significantly lethargic and encephalopathic.  Per history " from girlfriend, was experiencing double vision as well as unsteady gait for the past couple weeks as well.  Last known well was this past Thursday.  Based off signs and symptoms concern for underlying Warnicke's encephalopathy given extensive alcohol use (1.5 L of liquor daily).  Differential also includes hepatic encephalopathy in the setting of possible underlying cirrhosis.  CT head without acute abnormality.  S/p 1 rally bag and 500 mg IV thiamine in the ED    -Neurochecks every 4 hours  -Will continue high-dose IV thiamine IV X8 additional doses  -Checking ammonia levels, if elevated will consider NG tube and lactulose/rifaximin  -Continue monitor mentation    Lactic acidosis  Assessment & Plan  Lactic acidosis on presentation of 4.3, following 1 L rally bag still persistent at 4.3.  Currently receiving 1 L NS    -Continue to monitor/trend until normalization  -Maintain hemodynamics with goal MAP greater than 65    Hypokalemia- (present on admission)  Assessment & Plan  Severe hypokalemia of 2.2 noted on presentation, EKG with prolonged QT otherwise unremarkable.  Currently receiving repletion with IV KCl given significant lethargy    -Telemetry monitoring  -Continue IV KCl 80 mEq, will likely need more supplementation  -Continue monitor/trend    Anemia  Assessment & Plan  History of anemia, stable compared to previously with MCV high-end normal.  Likely in the setting of alcohol use with resultant B vitamin deficiencies (i.e. B9/B12)    -Continue monitor/trend  -Obtaining B9 and B12 levels  -Transfuse if Hgb less than 7  -Monitor for signs of bleeding    Tobacco use  Assessment & Plan  Patient is a 30-pack-year history, still smoking 1 pack/day.    -Will need extensive counseling when clinically appropriate    Alcohol use  Assessment & Plan  Significant alcohol abuse with multiple admissions in the past for severe withdrawals.  Currently drinking 1.5 L daily of liquor.  Alcohol levels on presentation  negative.    -Will need extensive counseling on cessation when clinically appropriate  -Monitor for withdrawals, CIWA protocol initiated    Prolonged QT interval  Assessment & Plan  Prolonged QTc of 543 noted on EKG, likely sequela of hyperkalemia.    -Telemetry monitoring  -Continue to optimize electrolytes (i.e. K and mag)    Transaminitis  Assessment & Plan  Mild transaminitis with AST of 124, ALT of 58 on presentation.  Alk phos also elevated at 318.  Likely alcoholic hepatitis in the setting of significant alcohol use.  Uncertain however likely that patient might have some form of liver disease secondary chronic alcohol abuse.    -Continue monitor/trend  -Obtaining RUQ ultrasound for further evaluation    Leukocytosis- (present on admission)  Assessment & Plan  Leukocytosis of 16.8 followed by 14.7 noted on presentation, neutrophilic predominant.  No signs of active infection at this time, possible stress demargination in the setting of acute illness.  Received IV ceftriaxone in ED.    -Continue monitor/trend  -Low threshold to initiate antibiotics if concern for infection        VTE prophylaxis: enoxaparin ppx

## 2024-11-04 NOTE — ASSESSMENT & PLAN NOTE
Prolonged QTc of 543 noted on EKG, likely sequela of hyperkalemia.    -Telemetry monitoring  -Continue to optimize electrolytes (i.e. K and mag)

## 2024-11-04 NOTE — ED NOTES
Pharmacy Medication Reconciliation      ~Medication reconciliation updated and complete per patient Girlfriend at [facility]  ~Allergies have been verified and updated   ~No oral ABX within the last 30 days  ~[facility] :  [facility] [phone]      ~Anticoagulants (rivaroxaban, apixaban, edoxaban, dabigatran, warfarin, enoxaparin) taken in the last 14 days? No

## 2024-11-04 NOTE — ASSESSMENT & PLAN NOTE
Patient is a 30-pack-year history, still smoking 1 pack/day.    -Will need extensive counseling when clinically appropriate

## 2024-11-04 NOTE — ED NOTES
Assist RN: Lab called with critical result of Lactic Acid of 4.3. Critical lab result read back to lab.  Dr. Case notified of critical lab result.

## 2024-11-04 NOTE — PROGRESS NOTES
Assumed care of PT A&O 1. Pt resting in bed with no signs of labored breathing. On RA. Tele monitor in place, cardiac rhythm being monitored. Call light within reach, bed in lowest position, upper bed rails up.

## 2024-11-04 NOTE — CARE PLAN
The patient is Stable - Low risk of patient condition declining or worsening    Shift Goals  Clinical Goals: Improved wakefulness, normaliza K and BG  Patient Goals: None  Family Goals: Updates    Progress made toward(s) clinical / shift goals:    Problem: Optimal Care for Alcohol Withdrawal  Goal: Optimal Care for the alcohol withdrawal patient  Outcome: Progressing     Problem: Seizure Precautions  Goal: Implementation of seizure precautions  Outcome: Met     Problem: Skin Integrity  Goal: Skin integrity is maintained or improved  Outcome: Progressing

## 2024-11-04 NOTE — ED NOTES
Assist RN: Lab called with critical result of potassium at 2.2.   Dr. Case notified of critical lab result at [phone].

## 2024-11-04 NOTE — ED PROVIDER NOTES
ED Provider Note    CHIEF COMPLAINT  Chief Complaint   Patient presents with    ALOC     BIBA pt was found down on the floor in his bathroom today by family checking on him. He lives alone, LKW this Thursday. Pt altered, GCS 11, some bruising to torso, no other obvious signs of trauma. NSR, 158BGL PTA. Pt has a hx of EtOH abuse.        EXTERNAL RECORDS REVIEWED  Inpatient Notes patient was admitted for alcohol withdrawal for 2 days August 8, 2024    HPI/ROS  LIMITATION TO HISTORY   Select: Altered mental status / Confusion  OUTSIDE HISTORIAN(S):  Girlfriend    Raj Sharpe is a 44 y.o. male with history of alcohol use disorder who presents for evaluation of altered mental status.  The patient was last seen normal on Thursday.  His girlfriend went out of town.  They do not live together.  His girlfriend's mom brought him food on Friday and he answered the door but just took the food.  His girlfriend did not talk to him on Saturday.  His girlfriend got back into town today, went to his apartment and found that he was in the bathroom laying on the ground.  He was not acting himself.  EMS was then called.  His girlfriend states that he has history of alcohol use disorder.  He was last sober in August but has been drinking 1.5 liters of vodka since September.  His girlfriend notes that he had double vision 2 weeks ago and has been having a wobbling gait.  When they found him today, there was alcohol out of his house.  When patient arrived, he was hypothermic.  Unable to obtain rectal temperature.  His girlfriend states that he does leave the wound open in his apartment to smoke.  She denies any other drug use that she is aware of.  Patient does awaken, states his name and states that he is at the hospital but quickly falls back asleep.    Further HPI limited secondary to patient's altered mental status    PAST MEDICAL HISTORY   has a past medical history of ETOH abuse and No pertinent past medical  "history.    SURGICAL HISTORY  patient denies any surgical history    FAMILY HISTORY  Family History   Problem Relation Age of Onset    No Known Problems Mother     Hyperlipidemia Father     No Known Problems Sister        SOCIAL HISTORY  Social History     Tobacco Use    Smoking status: Every Day     Current packs/day: 1.00     Average packs/day: 1 pack/day for 15.0 years (15.0 ttl pk-yrs)     Types: Cigarettes    Smokeless tobacco: Never   Vaping Use    Vaping status: Former    Substances: Nicotine   Substance and Sexual Activity    Alcohol use: Yes     Alcohol/week: 1.2 oz     Types: 2 Cans of beer per week    Drug use: Never    Sexual activity: Yes     Partners: Female     Birth control/protection: None       CURRENT MEDICATIONS  Home Medications    **Home medications have not yet been reviewed for this encounter**         ALLERGIES  No Known Allergies    PHYSICAL EXAM  VITAL SIGNS: /81   Pulse 61   Resp 14   Ht 1.753 m (5' 9\")   Wt 63 kg (139 lb)   SpO2 98%   BMI 20.53 kg/m²      Constitutional: Cachetic, ill appearing, eyes open looking around the room  HEENT: Normocephalic, Atraumatic,  external ears normal, pharynx pink,  Mucous  Membranes dry, Clear rhinorrhea noted to nose  Eyes: PERRL, EOMI, Conjunctiva normal, No discharge.   Neck: Normal range of motion, No tenderness, Supple, No stridor.   Cardiovascular: Regular Rate and Rhythm, No murmurs,  rubs, or gallops.   Thorax & Lungs: Lungs clear to auscultation bilaterally, No respiratory distress, No wheezes, rhales or rhonchi, No chest wall tenderness.   Abdomen:  Soft, non tender, non distended,  No pulsatile masses., no rebound guarding or peritoneal signs.   Skin: Warm, Dry, abrasion just under left knee but no obvious deformities  Back:  No CVA tenderness,  No spinal tenderness, bony crepitance step offs or instability.   Extremities: Equal, intact distal pulses, No cyanosis, clubbing or edema,  No tenderness.   Neurologic: Eyes open, moving " all four extremities, able to state his name and that he is at the hospital    EKG/LABS  Results for orders placed or performed during the hospital encounter of 11/03/24   AMMONIA    Collection Time: 11/03/24  9:15 PM   Result Value Ref Range    Ammonia 21 11 - 45 umol/L   DIAGNOSTIC ALCOHOL    Collection Time: 11/03/24  9:15 PM   Result Value Ref Range    Diagnostic Alcohol <10.1 <10.1 mg/dL   MAGNESIUM    Collection Time: 11/03/24  9:15 PM   Result Value Ref Range    Magnesium 2.4 1.5 - 2.5 mg/dL   PHOSPHORUS    Collection Time: 11/03/24  9:15 PM   Result Value Ref Range    Phosphorus 4.3 2.5 - 4.5 mg/dL   Lactic Acid    Collection Time: 11/03/24  9:15 PM   Result Value Ref Range    Lactic Acid 4.3 (HH) 0.5 - 2.0 mmol/L   CREATINE KINASE    Collection Time: 11/03/24  9:15 PM   Result Value Ref Range    CPK Total 929 (H) 0 - 154 U/L   CBC WITH DIFFERENTIAL    Collection Time: 11/03/24  9:15 PM   Result Value Ref Range    WBC 16.8 (H) 4.8 - 10.8 K/uL    RBC 3.73 (L) 4.70 - 6.10 M/uL    Hemoglobin 12.9 (L) 14.0 - 18.0 g/dL    Hematocrit 35.6 (L) 42.0 - 52.0 %    MCV 95.4 81.4 - 97.8 fL    MCH 34.6 (H) 27.0 - 33.0 pg    MCHC 36.2 32.3 - 36.5 g/dL    RDW 47.8 35.9 - 50.0 fL    MPV 11.0 9.0 - 12.9 fL    Neutrophils-Polys 94.00 (H) 44.00 - 72.00 %    Lymphocytes 6.00 (L) 22.00 - 41.00 %    Monocytes 0.00 0.00 - 13.40 %    Eosinophils 0.00 0.00 - 6.90 %    Basophils 0.00 0.00 - 1.80 %    Nucleated RBC 0.10 0.00 - 0.20 /100 WBC    Neutrophils (Absolute) 15.79 (H) 1.82 - 7.42 K/uL    Lymphs (Absolute) 1.01 1.00 - 4.80 K/uL    Monos (Absolute) 0.00 0.00 - 0.85 K/uL    Eos (Absolute) 0.00 0.00 - 0.51 K/uL    Baso (Absolute) 0.00 0.00 - 0.12 K/uL    NRBC (Absolute) 0.02 K/uL   Comp Metabolic Panel    Collection Time: 11/03/24  9:15 PM   Result Value Ref Range    Sodium 137 135 - 145 mmol/L    Potassium 2.2 (LL) 3.6 - 5.5 mmol/L    Chloride 89 (L) 96 - 112 mmol/L    Co2 26 20 - 33 mmol/L    Anion Gap 22.0 (H) 7.0 - 16.0     Glucose 143 (H) 65 - 99 mg/dL    Bun 31 (H) 8 - 22 mg/dL    Creatinine 0.86 0.50 - 1.40 mg/dL    Calcium 10.4 8.5 - 10.5 mg/dL    Correct Calcium 10.3 8.5 - 10.5 mg/dL    AST(SGOT) 124 (H) 12 - 45 U/L    ALT(SGPT) 58 (H) 2 - 50 U/L    Alkaline Phosphatase 318 (H) 30 - 99 U/L    Total Bilirubin 1.6 (H) 0.1 - 1.5 mg/dL    Albumin 4.1 3.2 - 4.9 g/dL    Total Protein 7.2 6.0 - 8.2 g/dL    Globulin 3.1 1.9 - 3.5 g/dL    A-G Ratio 1.3 g/dL   LIPASE    Collection Time: 11/03/24  9:15 PM   Result Value Ref Range    Lipase 79 11 - 82 U/L   ESTIMATED GFR    Collection Time: 11/03/24  9:15 PM   Result Value Ref Range    GFR (CKD-EPI) 110 >60 mL/min/1.73 m 2   DIFFERENTIAL MANUAL    Collection Time: 11/03/24  9:15 PM   Result Value Ref Range    Manual Diff Status PERFORMED    PERIPHERAL SMEAR REVIEW    Collection Time: 11/03/24  9:15 PM   Result Value Ref Range    Peripheral Smear Review see below    PLATELET ESTIMATE    Collection Time: 11/03/24  9:15 PM   Result Value Ref Range    Plt Estimation Decreased    MORPHOLOGY    Collection Time: 11/03/24  9:15 PM   Result Value Ref Range    RBC Morphology Present     Poikilocytosis 1+     Ovalocytes 1+     Target Cells 1+    IMMATURE PLT FRACTION    Collection Time: 11/03/24  9:15 PM   Result Value Ref Range    Imm. Plt Fraction 13.4 (H) 0.6 - 13.1 %   Urinalysis    Collection Time: 11/03/24  9:24 PM    Specimen: Urine   Result Value Ref Range    Color Orange (A)     Character Clear     Specific Gravity 1.032 <1.035    Ph 7.0 5.0 - 8.0    Glucose Negative Negative mg/dL    Ketones Trace (A) Negative mg/dL    Protein 30 (A) Negative mg/dL    Bilirubin Moderate (A) Negative    Urobilinogen, Urine 4.0 (A) <=1.0 EU/dL    Nitrite Positive (A) Negative    Leukocyte Esterase Small (A) Negative    Occult Blood Negative Negative    Micro Urine Req Microscopic    URINE DRUG SCREEN    Collection Time: 11/03/24  9:24 PM   Result Value Ref Range    Amphetamines Urine Negative Negative     Barbiturates Negative Negative    Benzodiazepines Negative Negative    Cocaine Metabolite Negative Negative    Fentanyl, Urine Negative Negative    Methadone Negative Negative    Opiates Negative Negative    Oxycodone Negative Negative    Phencyclidine -Pcp Negative Negative    Propoxyphene Negative Negative    Cannabinoid Metab Negative Negative   URINE MICROSCOPIC (W/UA)    Collection Time: 24  9:24 PM   Result Value Ref Range    WBC 0-2 /hpf    RBC 3-5 (A) 0 - 2 /hpf    Bacteria None Seen None /hpf    Epithelial Cells 0-2 0 - 5 /hpf    Mucous Threads Present /hpf    Urine Casts 3-5 (A) 0 - 2 /lpf   EKG    Collection Time: 24  9:29 PM   Result Value Ref Range    Report       [facility] Emergency Dept.    Test Date:  2024  Pt Name:    SAMAN HERNANDEZ                Department: ER  MRN:        [id number]                      Room:       [facility]  Gender:     Male                         Technician: [id number]  :        1980                   Requested By:ARIA CASE  Order #:    [id number]                    Reading MD: Aria Case    Measurements  Intervals                                Axis  Rate:       61                           P:          77  MT:         161                          QRS:        73  QRSD:       94                           T:          258  QT:         539  QTc:        543    Interpretive Statements  Sinus rhythm  Probable left atrial enlargement  Prolonged QT interval  Compared to ECG 2024 23:47:50  No significant changes  Electronically Signed On 2024 00:39:17 PST by Aria Case     Blood Culture - Draw one from central line and one from peripheral site    Collection Time: 24 10:21 PM    Specimen: Peripheral; Blood   Result Value Ref Range    Significant Indicator NEG     Source BLD     Site PERIPHERAL     Culture Result       No Growth  Note: Blood cultures are incubated for 5 days and  are monitored continuously.Positive  blood cultures  are called to the RN and reported as soon as  they are identified.     CoV-2, Flu A/B, And RSV by PCR ([facility])    Collection Time: 11/03/24 10:21 PM    Specimen: Nasal; Respirate   Result Value Ref Range    Influenza virus A RNA Negative Negative    Influenza virus B, PCR Negative Negative    RSV, PCR Negative Negative    SARS-CoV-2 by PCR NotDetected     SARS-CoV-2 Source NP Swab    POCT glucose device results    Collection Time: 11/03/24 10:44 PM   Result Value Ref Range    POC Glucose, Blood 396 (H) 65 - 99 mg/dL   Lactic Acid    Collection Time: 11/03/24 11:04 PM   Result Value Ref Range    Lactic Acid 4.3 (HH) 0.5 - 2.0 mmol/L       I have independently interpreted this EKG    RADIOLOGY/PROCEDURES   I have independently interpreted the diagnostic imaging associated with this visit and am waiting the final reading from the radiologist.   My preliminary interpretation is as follows: no ICH    Radiologist interpretation:  CT-HEAD W/O   Final Result      1.  No evidence of acute intracranial process.      2.  Atrophy.               DX-CHEST-PORTABLE (1 VIEW)   Final Result      No evidence of acute cardiopulmonary process.          COURSE & MEDICAL DECISION MAKING    ASSESSMENT, COURSE AND PLAN  Care Narrative:   This is a 44-year-old male with history of alcohol use disorder who is presenting for evaluation of altered mental status.  Patient was last seen normal this past Thursday.  His girlfriend just got back into town and therefore she went to his apartment and he was found laying in the bathroom.  On arrival, his eyes are opening, he is able to say his name but is delirious.  He has no tachycardia or hypertensive and is not tremulous although I did consider DTs.  Also consider ICH, Wernicke's encephalopathy, infectious process, abnormal electrolytes, rhabdomyolysis, dehydration, hypoglycemia, hepatic encephalopathy.    CT head with no acute findings.  Chest x-ray negative for infection.  Labs  with leukocytosis of 16,800.  Morelos catheter was placed, urine is nitrite positive but there is only 0-2 white cells.  Blood cultures x 2 were obtained.  Initial lactate 4.3 and after getting a rally bag, lactate still elevated at 4.3.  He was given another liter of NS.  I did consider alcohol withdrawal, was given Ativan however this just made him more somnolent.  I have strong suspicion for Wernicke encephalopathy as his girlfriend does state that he was complaining of double vision 2 weeks ago.  In discussing with her further, she states that his gait has been ataxic lately as well.  Patient was given 500 mg of thiamine.  He was found to be hypokalemic, IV repletion was ordered.  There is no TRISTAN. Ammonia is wnl. He was empirically given ceftriaxone to cover for acute infectious process.    9:50 PM patient reevaluated bedside.  His family is now at bedside and provides additional history.    11:30 PM discussed with pharmacy regarding thiamine order    12:29 AM Patient reevaluated no improvement in mental status after Ativan.    12:36 AM Discussed with intensivist, Dr. Nash, states appropriate with IMCU. Recommend to add blood gas and INR. These were ordered.     12:50 AM I discussed with the [facility] internal medicine resident working under hospitalist, Dr. Guerrero, who agreed to admit the patient to the hospital.       Hydration: Based on the patient's presentation of Dehydration the patient was given IV fluids. IV Hydration was used because oral hydration was not adequate alone. Upon recheck following hydration, the patient was still dehydrated.    CRITICAL CARE  The very real possibilty of a deterioration of this patient's condition required the highest level of my preparedness for sudden, emergent intervention.  I provided critical care services, which included medication orders, frequent reevaluations of the patient's condition and response to treatment, ordering and reviewing test results, and discussing the  case with various consultants.  The critical care time associated with the care of the patient was 42 minutes. Review chart for interventions. This time is exclusive of any other billable procedures.             ADDITIONAL PROBLEMS MANAGED  Hypokalemia  Alcohol use disorder    DISPOSITION AND DISCUSSIONS  I have discussed management of the patient with the following physicians and TARAH's:    Intensivist - Dr. Nash  Hospitalist - Dr. Guerrero    Discussion of management with other [facility] or appropriate source(s): Pharmacy regarding thiamine dosing      Escalation of care considered, and ultimately not performed: None    Barriers to care at this time, including but not limited to:  None .     Decision tools and prescription drugs considered including, but not limited to:  None .    DISPOSITION:  Patient will be hospitalized by Dr. Guerrero, hospitalist, in guarded condition.      FINAL DIAGNOSIS  1. Altered mental status, unspecified altered mental status type Acute   2. Hypokalemia Acute   3. Alcohol use disorder Acute        Electronically signed by: Meagan Case M.D., 11/3/2024 9:11 PM

## 2024-11-04 NOTE — ASSESSMENT & PLAN NOTE
Mild transaminitis with AST of 124, ALT of 58 on presentation.  Alk phos also elevated at 318.  Likely alcoholic hepatitis in the setting of significant alcohol use.  Uncertain however likely that patient might have some form of liver disease secondary chronic alcohol abuse.    -Continue monitor/trend  -Obtaining RUQ ultrasound for further evaluation

## 2024-11-04 NOTE — ASSESSMENT & PLAN NOTE
Presenting ED significantly lethargic and encephalopathic.  Per history from girlfriend, was experiencing double vision as well as unsteady gait for the past couple weeks as well.  Last known well was this past Thursday.  Based off signs and symptoms concern for underlying Warnicke's encephalopathy given extensive alcohol use (1.5 L of liquor daily).  Differential also includes hepatic encephalopathy in the setting of possible underlying cirrhosis.  CT head without acute abnormality.  S/p 1 rally bag and 500 mg IV thiamine in the ED    -Neurochecks every 4 hours  -Will continue high-dose IV thiamine IV X8 additional doses  -Checking ammonia levels, if elevated will consider NG tube and lactulose/rifaximin  -Continue monitor mentation

## 2024-11-04 NOTE — PROGRESS NOTES
44 y.o. male with history of alcohol use.  He was LKW Thursday when he was seen by his girlfriend.  She found him unresponsive and summoned EMS.  In the ED he was found to be hypothermic to 31C.  Girlfriend noted recent diplopia.  He was given high dose thiamine. CT head unremarkable.   Given encephalopathy, plan to admit to IMCU.    Fox Nash MD, E-[facility]  Critical Care Medicine  1:56 AM

## 2024-11-04 NOTE — PROGRESS NOTES
Lab called with critical result of glucose of 38 and k of 2.4 at 343. Critical lab result read back.   Dr. lafleur notified of critical lab result at 0344.  Critical lab result read back by Dr. Lafleur.

## 2024-11-04 NOTE — PROGRESS NOTES
4 Eyes Skin Assessment Completed by DAMARIS Zuniga and DAMARIS Ann.    Head- L eyebrow scabbed laceration  Ears WDL  Nose WDL  Mouth- Dry  Neck WDL  Breast/Chest Redness and Blanching  Shoulder Blades- R shoulder blade- purple, discoloraton, bruising  Spine Redness and Blanching  (R) Arm/Elbow/Hand Redness and Non-Blanching  (L) Arm/Elbow/Hand Redness and Non-Blanching  Abdomen-WDL  Groin WDL  Scrotum/Coccyx/Buttocks Redness, Blanching, and Excoriation  (R) Leg Redness, Blanching, and Abrasion  (L) Leg Redness, Non-Blanching, and Abrasion  (R) Heel/Foot/Toe Redness, Blanching, and Boggy  (L) Heel/Foot/Toe Redness, Blanching, and Boggy          Devices In Places ECG, Blood Pressure Cuff, Pulse Ox, and SCD's      Interventions In Place Pillows, Q2 Turns, ZFlo Pillow, and Heels Loaded W/Pillows    Possible Skin Injury Yes    Pictures Uploaded Into Epic Yes  Wound Consult Placed Yes  RN Wound Prevention Protocol Ordered Yes

## 2024-11-04 NOTE — ASSESSMENT & PLAN NOTE
Lactic acidosis on presentation of 4.3, following 1 L rally bag still persistent at 4.3.  Currently receiving 1 L NS    -Continue to monitor/trend until normalization  -Maintain hemodynamics with goal MAP greater than 65

## 2024-11-04 NOTE — ASSESSMENT & PLAN NOTE
Severe hypokalemia of 2.2 noted on presentation, EKG with prolonged QT otherwise unremarkable.  Currently receiving repletion with IV KCl given significant lethargy    -Telemetry monitoring  -Continue IV KCl 80 mEq, will likely need more supplementation  -Continue monitor/trend

## 2024-11-04 NOTE — PROGRESS NOTES
Updated pt's sister and father of patient's condition. Per sister, pt has been admitted to the hospital for ETOH use in the past many times and always leaves AMA. Sister and father both concerned with pt's chronic ETOH use.

## 2024-11-04 NOTE — ASSESSMENT & PLAN NOTE
History of anemia, stable compared to previously with MCV high-end normal.  Likely in the setting of alcohol use with resultant B vitamin deficiencies (i.e. B9/B12)    -Continue monitor/trend  -Obtaining B9 and B12 levels  -Transfuse if Hgb less than 7  -Monitor for signs of bleeding

## 2024-11-04 NOTE — ED TRIAGE NOTES
Chief Complaint   Patient presents with    ALOC     BIBA pt was found down on the floor in his bathroom today by family checking on him. He lives alone, LKW this Thursday. Pt altered, GCS 11, some bruising to torso, no other obvious signs of trauma. NSR, 158BGL PTA. Pt has a hx of EtOH abuse.

## 2024-11-04 NOTE — ASSESSMENT & PLAN NOTE
Leukocytosis of 16.8 followed by 14.7 noted on presentation, neutrophilic predominant.  No signs of active infection at this time, possible stress demargination in the setting of acute illness.  Received IV ceftriaxone in ED.    -Continue monitor/trend  -Low threshold to initiate antibiotics if concern for infection

## 2024-11-04 NOTE — WOUND TEAM
"[facility]  Inpatient Services  Initial Wound and Skin Care Evaluation    Admission Date: 11/3/2024     Last order of IP CONSULT TO WOUND CARE was found on 11/4/2024 from Hospital Encounter on 11/3/2024     HPI, PMH, SH: Reviewed    No past surgical history on file.  Social History     Tobacco Use    Smoking status: Every Day     Current packs/day: 1.00     Average packs/day: 1 pack/day for 15.0 years (15.0 ttl pk-yrs)     Types: Cigarettes    Smokeless tobacco: Never   Substance Use Topics    Alcohol use: Yes     Alcohol/week: 1.2 oz     Types: 2 Cans of beer per week     Chief Complaint   Patient presents with    ALOC     BIBA pt was found down on the floor in his bathroom today by family checking on him. He lives alone, LKW this Thursday. Pt altered, GCS 11, some bruising to torso, no other obvious signs of trauma. NSR, 158BGL PTA. Pt has a hx of EtOH abuse.      Diagnosis: Altered mental status [R41.82]    Unit where seen by Wound Team: R106/00     WOUND CONSULT RELATED TO:  multiple areas of sDTI vs ecchymosis r/t pt being found down unknown time    WOUND TEAM PLAN OF CARE - Frequency of Follow-up:   Nursing to follow dressing orders written for wound care. Contact wound team if area fails to progress, deteriorates or with any questions/concerns if something comes up before next scheduled follow up (See below as to whether wound is following and frequency of wound follow up)   Not following, consult as needed  - shoulders, BLE,     WOUND HISTORY:   Per ED MD note\" BIBA pt was found down on the floor in his bathroom today by family checking on him. He lives alone, LKW this Thursday. Pt altered, GCS 11, some bruising to torso, no other obvious signs of trauma. NSR, 158BGL PTA. Pt has a hx of EtOH abuse. \"       WOUND ASSESSMENT/LDA  Wound 11/04/24 Pressure Injury Shoulder Right POA DTI (Active)   Date First Assessed/Time First Assessed: 11/04/24 0157   Primary Wound Type: Pressure Injury  " Location: Shoulder  Laterality: Right  Wound Description (Comments): POA DTI      Assessments 11/4/2024 10:00 AM   Wound Image     Site Assessment Light Purple;Pink   Periwound Assessment Intact   Margins Attached edges;Defined edges   Closure Open to air   Drainage Amount None   Periwound Protectant Not Applicable   Dressing Status Intact   Dressing Changed New   Dressing Options Offloading Dressing - Sacral   Dressing Change/Treatment Frequency Every 72 hrs, and As Needed   NEXT Dressing Change/Treatment Date 11/07/24   Wound Team Following Not following   Pressure Injury Stage Deep Tissue   Wound Length (cm) 3.5 cm   Wound Width (cm) 7.2 cm   Wound Surface Area (cm^2) 25.2 cm^2   Shape circular       Wound 11/03/24 Pressure Injury Shoulder Left POA St 2 or revealing DTI (Active)   Date First Assessed/Time First Assessed: 11/03/24 0157   Primary Wound Type: Pressure Injury  Location: Shoulder  Laterality: Left  Wound Description (Comments): POA St 2 or revealing DTI      Assessments 11/4/2024 10:00 AM   Wound Image     Site Assessment Red;Pink   Periwound Assessment Pink   Margins Defined edges;Attached edges   Closure Open to air   Treatments Cleansed   Wound Cleansing Foam Cleanser/Washcloth   Periwound Protectant Not Applicable   Dressing Status Intact   Dressing Changed New   Dressing Cleansing/Solutions Not Applicable   Dressing Options Offloading Dressing - Sacral   Dressing Change/Treatment Frequency Every 72 hrs, and As Needed   NEXT Dressing Change/Treatment Date 11/07/24   NEXT Weekly Photo (Inpatient Only) 11/10/24   Wound Team Following Not following   Pressure Injury Stage Deep Tissue   Wound Length (cm) 9 cm   Wound Width (cm) 7 cm   Wound Surface Area (cm^2) 63 cm^2       Wound 11/04/24 Abrasion Knee Anterior Left (Active)   Date First Assessed/Time First Assessed: 11/04/24 0158   Primary Wound Type: Abrasion  Location: Knee  Wound Orientation: Anterior  Laterality: Left      Assessments 11/4/2024  10:00 AM   Wound Image      Site Assessment Red   Periwound Assessment Pink   Margins Defined edges   Closure Secondary intention   Drainage Amount None   Treatments Cleansed;Nonselective debridement   Wound Cleansing Foam Cleanser/Washcloth   Periwound Protectant Not Applicable   Dressing Status Intact   Dressing Changed New   Dressing Cleansing/Solutions Not Applicable   Dressing Options Offloading Dressing - Sacral   Dressing Change/Treatment Frequency Every 72 hrs, and As Needed   NEXT Dressing Change/Treatment Date 11/07/24   NEXT Weekly Photo (Inpatient Only) 11/10/24   Wound Team Following Not following   Wound Length (cm) 21 cm   Wound Width (cm) 15 cm   Wound Surface Area (cm^2) 315 cm^2   Shape irregular       Wound 11/04/24 Pressure Injury Back Lateral;Upper Right POA DTI (Active)   Date First Assessed/Time First Assessed: 11/04/24 0159   Primary Wound Type: Pressure Injury  Location: Back  Wound Orientation: Lateral;Upper  Laterality: Right  Wound Description (Comments): POA DTI      Assessments 11/4/2024 10:00 AM   Wound Image      Site Assessment Red   Periwound Assessment Pink;Red   Margins Defined edges   Closure Secondary intention   Drainage Amount None   Treatments Nonselective debridement;Cleansed   Wound Cleansing Foam Cleanser/Washcloth   Periwound Protectant Not Applicable   Dressing Status Intact   Dressing Changed New   Dressing Cleansing/Solutions Not Applicable   Dressing Options Offloading Dressing - Sacral   Dressing Change/Treatment Frequency Every 72 hrs, and As Needed   NEXT Dressing Change/Treatment Date 11/07/24   NEXT Weekly Photo (Inpatient Only) 11/10/24   Wound Team Following Not following   Pressure Injury Stage Deep Tissue   Wound Length (cm) 3.5 cm   Wound Width (cm) 7.2 cm   Wound Surface Area (cm^2) 25.2 cm^2   Shape oval        Vascular:    OLIVIA:   No results found.    Lab Values:    Lab Results   Component Value Date/Time    WBC 17.9 (H) 11/04/2024 05:18 AM    RBC 2.94 (L)  11/04/2024 05:18 AM    HEMOGLOBIN 10.0 (L) 11/04/2024 05:18 AM    HEMATOCRIT 28.6 (L) 11/04/2024 05:18 AM    HBA1C 4.9 01/15/2024 04:15 PM         Culture Results show:  No results found for this or any previous visit (from the past 720 hours).    Pain Level/Medicated:  None, Tolerated without pain medication       INTERVENTIONS BY WOUND TEAM:  Chart and images reviewed. Discussed with bedside RN. All areas of concern (based on picture review, LDA review and discussion with bedside RN) have been thoroughly assessed. Documentation of areas based on significant findings. This RN in to assess patient. Performed standard wound care which includes appropriate positioning, dressing removal and non-selective debridement. Pictures and measurements obtained weekly if/when required.    Wound:  LLE knee and lateral tibial area  Preparation for Dressing removal: Open to air  Cleansed/Non-selectively Debrided with:  No rinse foam soap and Moist warm washcloth  Vikki wound: Cleansed with No rinse foam soap and Moist warm washcloth, Prepped with N/A  Primary Dressing:  sacral offloading drsg     Wound:  areas of concern- shoulders, lower back  Preparation for Dressing removal: Open to air  Vikki wound: Cleansed with Moist warm washcloth, Prepped with N/A  Primary Dressing:  sacral offloading drsg    Advanced Wound Care Discharge Planning  Number of Clinicians necessary to complete wound care: 2  Is patient requiring IV pain medications for dressing changes:  No   Length of time for dressing change 40 min. (This does not include chart review, pre-medication time, set up, clean up or time spent charting.)    Interdisciplinary consultation: Patient, Bedside RN (Kimberly).  Pressure injury and staging reviewed with N/A.    EVALUATION / RATIONALE FOR TREATMENT:     Date:  11/04/24  Wound Status:  Initial evaluation    Pt has non-blanching skin to lateral LLE, both shoulders, R and L proximal lateral and  R posterior area. Discoloration to  lateral feet and [place] has faded to a grey barely purple.          Goals: Steady decrease in wound area and depth weekly.    NURSING PLAN OF CARE ORDERS:  Dressing changes: See Dressing Care orders  RN Prevention Protocol    NUTRITION RECOMMENDATIONS   Wound Team Recommendations:  N/A    DIET ORDERS (From admission to next 24h)       Start     Ordered    11/04/24 0107  Diet NPO Restrict to: Strict  ALL MEALS        Question:  Diet NPO Restrict to:  Answer:  Strict    11/04/24 0106                    PREVENTATIVE INTERVENTIONS:    Q shift Javon - performed per nursing policy  Q shift pressure point assessments - performed per nursing policy    Surface/Positioning  ICU Low Airloss - Currently in Place  Reposition q 2 hours - Currently in Place    Offloading/Redistribution  Sacral offloading dressing (Silicone dressing) - Currently in Place  Float Heels off Bed with Pillows - Currently in Place           Respiratory  N/A    Containment/Moisture Prevention    [place] Catheter - Currently in Place    Anticipated discharge plans:  TBD        Vac Discharge Needs:  Vac Discharge plan is purely a recommendation from wound team and not a requirement for discharge unless otherwise stated by physician.  Not Applicable Pt not on a wound vac

## 2024-11-04 NOTE — ASSESSMENT & PLAN NOTE
Significant alcohol abuse with multiple admissions in the past for severe withdrawals.  Currently drinking 1.5 L daily of liquor.  Alcohol levels on presentation negative.    -Will need extensive counseling on cessation when clinically appropriate  -Monitor for withdrawals, [facility] protocol initiated

## 2024-11-05 ENCOUNTER — APPOINTMENT (OUTPATIENT)
Dept: RADIOLOGY | Facility: MEDICAL CENTER | Age: 44
DRG: 897 | End: 2024-11-05
Payer: MEDICAID

## 2024-11-05 PROBLEM — R74.8 ELEVATED CREATINE KINASE LEVEL: Status: ACTIVE | Noted: 2024-11-05

## 2024-11-05 PROBLEM — D69.6 THROMBOCYTOPENIA (HCC): Status: ACTIVE | Noted: 2024-11-05

## 2024-11-05 PROBLEM — K76.0 HEPATIC STEATOSIS: Status: ACTIVE | Noted: 2024-11-05

## 2024-11-05 LAB
ALBUMIN SERPL BCP-MCNC: 2.8 G/DL (ref 3.2–4.9)
ANION GAP SERPL CALC-SCNC: 12 MMOL/L (ref 7–16)
APPEARANCE UR: CLEAR
BASOPHILS # BLD AUTO: 0.2 % (ref 0–1.8)
BASOPHILS # BLD: 0.03 K/UL (ref 0–0.12)
BILIRUB UR QL STRIP.AUTO: NEGATIVE
BUN SERPL-MCNC: 8 MG/DL (ref 8–22)
BUN SERPL-MCNC: 9 MG/DL (ref 8–22)
CALCIUM ALBUM COR SERPL-MCNC: 10.1 MG/DL (ref 8.5–10.5)
CALCIUM SERPL-MCNC: 8.7 MG/DL (ref 8.5–10.5)
CALCIUM SERPL-MCNC: 9.1 MG/DL (ref 8.5–10.5)
CHLORIDE SERPL-SCNC: 101 MMOL/L (ref 96–112)
CHLORIDE SERPL-SCNC: 104 MMOL/L (ref 96–112)
CK SERPL-CCNC: 492 U/L (ref 0–154)
CK SERPL-CCNC: 633 U/L (ref 0–154)
CO2 SERPL-SCNC: 21 MMOL/L (ref 20–33)
CO2 SERPL-SCNC: 23 MMOL/L (ref 20–33)
COLOR UR: ABNORMAL
CREAT SERPL-MCNC: 0.47 MG/DL (ref 0.5–1.4)
CREAT SERPL-MCNC: 0.5 MG/DL (ref 0.5–1.4)
EOSINOPHIL # BLD AUTO: 0.06 K/UL (ref 0–0.51)
EOSINOPHIL NFR BLD: 0.4 % (ref 0–6.9)
ERYTHROCYTE [DISTWIDTH] IN BLOOD BY AUTOMATED COUNT: 51.8 FL (ref 35.9–50)
GFR SERPLBLD CREATININE-BSD FMLA CKD-EPI: 129 ML/MIN/1.73 M 2
GFR SERPLBLD CREATININE-BSD FMLA CKD-EPI: 132 ML/MIN/1.73 M 2
GLUCOSE BLD STRIP.AUTO-MCNC: 81 MG/DL (ref 65–99)
GLUCOSE SERPL-MCNC: 91 MG/DL (ref 65–99)
GLUCOSE SERPL-MCNC: 97 MG/DL (ref 65–99)
GLUCOSE UR STRIP.AUTO-MCNC: NEGATIVE MG/DL
HCT VFR BLD AUTO: 28.2 % (ref 42–52)
HGB BLD-MCNC: 9.9 G/DL (ref 14–18)
IMM GRANULOCYTES # BLD AUTO: 0.21 K/UL (ref 0–0.11)
IMM GRANULOCYTES NFR BLD AUTO: 1.4 % (ref 0–0.9)
KETONES UR STRIP.AUTO-MCNC: NEGATIVE MG/DL
LACTATE SERPL-SCNC: 1.9 MMOL/L (ref 0.5–2)
LACTATE SERPL-SCNC: 2.3 MMOL/L (ref 0.5–2)
LACTATE SERPL-SCNC: 3 MMOL/L (ref 0.5–2)
LACTATE SERPL-SCNC: 3.7 MMOL/L (ref 0.5–2)
LACTATE SERPL-SCNC: 4 MMOL/L (ref 0.5–2)
LEUKOCYTE ESTERASE UR QL STRIP.AUTO: NEGATIVE
LYMPHOCYTES # BLD AUTO: 2.68 K/UL (ref 1–4.8)
LYMPHOCYTES NFR BLD: 18 % (ref 22–41)
MAGNESIUM SERPL-MCNC: 2.6 MG/DL (ref 1.5–2.5)
MCH RBC QN AUTO: 34.9 PG (ref 27–33)
MCHC RBC AUTO-ENTMCNC: 35.1 G/DL (ref 32.3–36.5)
MCV RBC AUTO: 99.3 FL (ref 81.4–97.8)
MICRO URNS: ABNORMAL
MONOCYTES # BLD AUTO: 0.79 K/UL (ref 0–0.85)
MONOCYTES NFR BLD AUTO: 5.3 % (ref 0–13.4)
NEUTROPHILS # BLD AUTO: 11.11 K/UL (ref 1.82–7.42)
NEUTROPHILS NFR BLD: 74.7 % (ref 44–72)
NITRITE UR QL STRIP.AUTO: NEGATIVE
NRBC # BLD AUTO: 0.03 K/UL
NRBC BLD-RTO: 0.2 /100 WBC (ref 0–0.2)
PH UR STRIP.AUTO: 7.5 [PH] (ref 5–8)
PHOSPHATE SERPL-MCNC: 1.9 MG/DL (ref 2.5–4.5)
PHOSPHATE SERPL-MCNC: 2.1 MG/DL (ref 2.5–4.5)
PLATELET # BLD AUTO: 129 K/UL (ref 164–446)
PLATELET # BLD AUTO: 151 K/UL (ref 164–446)
PMV BLD AUTO: 11.3 FL (ref 9–12.9)
POTASSIUM SERPL-SCNC: 3.3 MMOL/L (ref 3.6–5.5)
POTASSIUM SERPL-SCNC: 4.2 MMOL/L (ref 3.6–5.5)
PROT UR QL STRIP: NEGATIVE MG/DL
RBC # BLD AUTO: 2.84 M/UL (ref 4.7–6.1)
RBC UR QL AUTO: NEGATIVE
SODIUM SERPL-SCNC: 136 MMOL/L (ref 135–145)
SODIUM SERPL-SCNC: 137 MMOL/L (ref 135–145)
SP GR UR STRIP.AUTO: 1.01
UROBILINOGEN UR STRIP.AUTO-MCNC: >=8 EU/DL
WBC # BLD AUTO: 14.9 K/UL (ref 4.8–10.8)

## 2024-11-05 PROCEDURE — A9270 NON-COVERED ITEM OR SERVICE: HCPCS

## 2024-11-05 PROCEDURE — 700102 HCHG RX REV CODE 250 W/ 637 OVERRIDE(OP)

## 2024-11-05 PROCEDURE — 700105 HCHG RX REV CODE 258

## 2024-11-05 PROCEDURE — 700105 HCHG RX REV CODE 258: Performed by: NURSE PRACTITIONER

## 2024-11-05 PROCEDURE — 76705 ECHO EXAM OF ABDOMEN: CPT

## 2024-11-05 PROCEDURE — 84100 ASSAY OF PHOSPHORUS: CPT

## 2024-11-05 PROCEDURE — 80069 RENAL FUNCTION PANEL: CPT

## 2024-11-05 PROCEDURE — 83605 ASSAY OF LACTIC ACID: CPT | Mod: 91

## 2024-11-05 PROCEDURE — 81003 URINALYSIS AUTO W/O SCOPE: CPT

## 2024-11-05 PROCEDURE — 80048 BASIC METABOLIC PNL TOTAL CA: CPT

## 2024-11-05 PROCEDURE — 700102 HCHG RX REV CODE 250 W/ 637 OVERRIDE(OP): Performed by: STUDENT IN AN ORGANIZED HEALTH CARE EDUCATION/TRAINING PROGRAM

## 2024-11-05 PROCEDURE — 82550 ASSAY OF CK (CPK): CPT | Mod: 91

## 2024-11-05 PROCEDURE — 700111 HCHG RX REV CODE 636 W/ 250 OVERRIDE (IP)

## 2024-11-05 PROCEDURE — 99233 SBSQ HOSP IP/OBS HIGH 50: CPT | Performed by: HOSPITALIST

## 2024-11-05 PROCEDURE — 36415 COLL VENOUS BLD VENIPUNCTURE: CPT

## 2024-11-05 PROCEDURE — 51798 US URINE CAPACITY MEASURE: CPT

## 2024-11-05 PROCEDURE — A9270 NON-COVERED ITEM OR SERVICE: HCPCS | Performed by: STUDENT IN AN ORGANIZED HEALTH CARE EDUCATION/TRAINING PROGRAM

## 2024-11-05 PROCEDURE — 85025 COMPLETE CBC W/AUTO DIFF WBC: CPT

## 2024-11-05 PROCEDURE — 83735 ASSAY OF MAGNESIUM: CPT

## 2024-11-05 PROCEDURE — 770020 HCHG ROOM/CARE - TELE (206)

## 2024-11-05 PROCEDURE — 700101 HCHG RX REV CODE 250

## 2024-11-05 RX ORDER — FOLIC ACID 1 MG/1
1 TABLET ORAL DAILY
Status: DISCONTINUED | OUTPATIENT
Start: 2024-11-06 | End: 2024-11-07

## 2024-11-05 RX ORDER — SODIUM CHLORIDE 9 MG/ML
INJECTION, SOLUTION INTRAVENOUS CONTINUOUS
Status: DISCONTINUED | OUTPATIENT
Start: 2024-11-05 | End: 2024-11-07

## 2024-11-05 RX ORDER — GAUZE BANDAGE 2" X 2"
100 BANDAGE TOPICAL DAILY
Status: DISCONTINUED | OUTPATIENT
Start: 2024-11-07 | End: 2024-11-07

## 2024-11-05 RX ORDER — SODIUM CHLORIDE 9 MG/ML
500 INJECTION, SOLUTION INTRAVENOUS ONCE
Status: COMPLETED | OUTPATIENT
Start: 2024-11-05 | End: 2024-11-05

## 2024-11-05 RX ADMIN — LORAZEPAM 1 MG: 1 TABLET ORAL at 12:11

## 2024-11-05 RX ADMIN — LORAZEPAM 1 MG: 1 TABLET ORAL at 16:09

## 2024-11-05 RX ADMIN — THIAMINE HYDROCHLORIDE 500 MG: 100 INJECTION, SOLUTION INTRAMUSCULAR; INTRAVENOUS at 22:28

## 2024-11-05 RX ADMIN — ENOXAPARIN SODIUM 40 MG: 100 INJECTION SUBCUTANEOUS at 16:09

## 2024-11-05 RX ADMIN — SODIUM CHLORIDE 500 ML: 9 INJECTION, SOLUTION INTRAVENOUS at 05:26

## 2024-11-05 RX ADMIN — LORAZEPAM 1 MG: 1 TABLET ORAL at 08:11

## 2024-11-05 RX ADMIN — SODIUM CHLORIDE 995 ML: 9 INJECTION, SOLUTION INTRAVENOUS at 02:13

## 2024-11-05 RX ADMIN — THIAMINE HYDROCHLORIDE 500 MG: 100 INJECTION, SOLUTION INTRAMUSCULAR; INTRAVENOUS at 16:11

## 2024-11-05 RX ADMIN — POTASSIUM PHOSPHATE, MONOBASIC AND POTASSIUM PHOSPHATE, DIBASIC 30 MMOL: 224; 236 INJECTION, SOLUTION, CONCENTRATE INTRAVENOUS at 14:19

## 2024-11-05 RX ADMIN — Medication 100 MG: at 05:26

## 2024-11-05 RX ADMIN — SODIUM CHLORIDE: 9 INJECTION, SOLUTION INTRAVENOUS at 08:55

## 2024-11-05 RX ADMIN — FOLIC ACID 1 MG: 1 TABLET ORAL at 05:26

## 2024-11-05 RX ADMIN — THIAMINE HYDROCHLORIDE 500 MG: 100 INJECTION, SOLUTION INTRAMUSCULAR; INTRAVENOUS at 09:23

## 2024-11-05 RX ADMIN — POTASSIUM CHLORIDE 40 MEQ: 1500 TABLET, EXTENDED RELEASE ORAL at 02:13

## 2024-11-05 RX ADMIN — THERA TABS 1 TABLET: TAB at 05:26

## 2024-11-05 RX ADMIN — POTASSIUM CHLORIDE 40 MEQ: 1500 TABLET, EXTENDED RELEASE ORAL at 05:26

## 2024-11-05 RX ADMIN — DIBASIC SODIUM PHOSPHATE, MONOBASIC POTASSIUM PHOSPHATE AND MONOBASIC SODIUM PHOSPHATE 500 MG: 852; 155; 130 TABLET ORAL at 08:11

## 2024-11-05 RX ADMIN — DIBASIC SODIUM PHOSPHATE, MONOBASIC POTASSIUM PHOSPHATE AND MONOBASIC SODIUM PHOSPHATE 500 MG: 852; 155; 130 TABLET ORAL at 12:11

## 2024-11-05 RX ADMIN — LORAZEPAM 0.5 MG: 1 TABLET ORAL at 22:39

## 2024-11-05 RX ADMIN — SODIUM CHLORIDE: 9 INJECTION, SOLUTION INTRAVENOUS at 22:28

## 2024-11-05 RX ADMIN — SODIUM CHLORIDE 200 ML: 9 INJECTION, SOLUTION INTRAVENOUS at 06:47

## 2024-11-05 ASSESSMENT — LIFESTYLE VARIABLES
PAROXYSMAL SWEATS: NO SWEAT VISIBLE
PAROXYSMAL SWEATS: NO SWEAT VISIBLE
TOTAL SCORE: 6
NAUSEA AND VOMITING: NO NAUSEA AND NO VOMITING
NAUSEA AND VOMITING: NO NAUSEA AND NO VOMITING
ORIENTATION AND CLOUDING OF SENSORIUM: DISORIENTED FOR PLACE AND / OR PERSON
ANXIETY: MODERATELY ANXIOUS OR GUARDED, SO ANXIETY IS INFERRED
ANXIETY: MODERATELY ANXIOUS OR GUARDED, SO ANXIETY IS INFERRED
TREMOR: NO TREMOR
VISUAL DISTURBANCES: NOT PRESENT
HEADACHE, FULLNESS IN HEAD: NOT PRESENT
ORIENTATION AND CLOUDING OF SENSORIUM: DISORIENTED FOR PLACE AND / OR PERSON
ANXIETY: MODERATELY ANXIOUS OR GUARDED, SO ANXIETY IS INFERRED
AUDITORY DISTURBANCES: NOT PRESENT
TOTAL SCORE: 10
VISUAL DISTURBANCES: NOT PRESENT
AGITATION: NORMAL ACTIVITY
TREMOR: NO TREMOR
HEADACHE, FULLNESS IN HEAD: NOT PRESENT
ORIENTATION AND CLOUDING OF SENSORIUM: DISORIENTED FOR PLACE AND / OR PERSON
ORIENTATION AND CLOUDING OF SENSORIUM: DISORIENTED FOR PLACE AND / OR PERSON
VISUAL DISTURBANCES: NOT PRESENT
VISUAL DISTURBANCES: NOT PRESENT
ANXIETY: NO ANXIETY (AT EASE)
AUDITORY DISTURBANCES: NOT PRESENT
AUDITORY DISTURBANCES: NOT PRESENT
VISUAL DISTURBANCES: NOT PRESENT
HEADACHE, FULLNESS IN HEAD: NOT PRESENT
TOTAL SCORE: 10
PAROXYSMAL SWEATS: NO SWEAT VISIBLE
NAUSEA AND VOMITING: NO NAUSEA AND NO VOMITING
AGITATION: NORMAL ACTIVITY
AUDITORY DISTURBANCES: NOT PRESENT
ANXIETY: *
TREMOR: NO TREMOR
NAUSEA AND VOMITING: NO NAUSEA AND NO VOMITING
HEADACHE, FULLNESS IN HEAD: NOT PRESENT
TOTAL SCORE: 4
AGITATION: NORMAL ACTIVITY
AUDITORY DISTURBANCES: NOT PRESENT
PAROXYSMAL SWEATS: NO SWEAT VISIBLE
HEADACHE, FULLNESS IN HEAD: NOT PRESENT
NAUSEA AND VOMITING: NO NAUSEA AND NO VOMITING
ORIENTATION AND CLOUDING OF SENSORIUM: DISORIENTED FOR PLACE AND / OR PERSON
PAROXYSMAL SWEATS: NO SWEAT VISIBLE
TREMOR: NO TREMOR
AGITATION: *
TREMOR: NO TREMOR
AGITATION: *
TOTAL SCORE: 8

## 2024-11-05 ASSESSMENT — ENCOUNTER SYMPTOMS
VOMITING: 0
EYE PAIN: 0
BLURRED VISION: 0
DEPRESSION: 0
HALLUCINATIONS: 0
CONSTIPATION: 0
LOSS OF CONSCIOUSNESS: 0
FALLS: 0
HEADACHES: 0
COUGH: 0
DIARRHEA: 0
CHILLS: 0
NAUSEA: 0
BRUISES/BLEEDS EASILY: 0
ABDOMINAL PAIN: 0
SEIZURES: 0
PALPITATIONS: 0
SORE THROAT: 0
FEVER: 0
SHORTNESS OF BREATH: 0
WEAKNESS: 0

## 2024-11-05 ASSESSMENT — PAIN DESCRIPTION - PAIN TYPE
TYPE: ACUTE PAIN

## 2024-11-05 ASSESSMENT — FIBROSIS 4 INDEX: FIB4 SCORE: 3.29

## 2024-11-05 NOTE — ASSESSMENT & PLAN NOTE
Assessment: Ultrasound shows hepatic steatosis and hepatomegaly, likely related to chronic alcohol use.  Plan: Monitor liver function tests. Encourage alcohol cessation. Consider hepatology consultation if liver function worsens.

## 2024-11-05 NOTE — ASSESSMENT & PLAN NOTE
Assessment: Low hemoglobin (9.9 g/dL), likely due to nutritional deficiencies. Labs on 11/7/2024 shows low Folic Acid.  Plan:   -Supplement as necessary. Increased dose of Folic Acid on 11/07/2024.  -Monitor complete blood count (CBC) and reticulocyte count.

## 2024-11-05 NOTE — PROGRESS NOTES
Telemetry Report    Rhythm SR  Rate 71-85  Ectopy R PVC     AR 0.14  QRS 0.08  QT 0.42        Per telemetry room monitor

## 2024-11-05 NOTE — HOSPITAL COURSE
44-year-old male with a history of significant alcohol abuse, presented on 11/3/2024 with altered mental status. His girlfriend, who provided the history, found him minimally responsive in the bathroom after returning from out of town. He was last seen normal the previous Thursday. The patient had been experiencing unsteady gait and double vision two weeks prior and was consuming approximately 1.5 liters of vodka daily. He has a history of multiple admissions requiring aggressive [facility] protocol measures. CT head and chest X-ray showed no acute abnormalities.    The patient was initially admitted to the Intermediate Care Unit (IMCU) for management of encephalopathy with severe hypokalemia. Patient has been transferred to medicine floor on 11/05/2024.    The patient initially received intravenous thiamine at a dosage of 500 mg three times daily for approximately three days. This dosage was then reduced to 200 mg three times daily, with plans to maintain this regimen for one to two weeks before transitioning to oral administration.

## 2024-11-05 NOTE — ASSESSMENT & PLAN NOTE
Assessment: The patient has a significant history of alcohol abuse, consuming approximately 1.5 liters of vodka daily, with multiple prior admissions for withdrawal management.  Plan: Initiate [facility] protocol for withdrawal management. Administer thiamine and multivitamins. Provide counseling and discuss long-term treatment options for alcohol dependence, including referral to a rehabilitation program.

## 2024-11-05 NOTE — ASSESSMENT & PLAN NOTE
Resolved   Assessment: Lactic acid initially elevated, now trending down, indicating possible metabolic stress.  Plan: Continue monitoring lactic acid levels. Ensure adequate oxygenation and perfusion. Adjust fluid management as needed.

## 2024-11-05 NOTE — ASSESSMENT & PLAN NOTE
Assessment: Reduced platelet count (151 K/uL) on 11/05/2024, potentially related to alcohol use or liver dysfunction.  Plan: Monitor platelet levels. Assess for bleeding risks.

## 2024-11-05 NOTE — ASSESSMENT & PLAN NOTE
Resolved   Leukocytosis of 16.8 followed by 14.7 noted on presentation, neutrophilic predominant.  No signs of active infection at this time, possible stress demargination in the setting of acute illness.  Received IV ceftriaxone in ED.  -Continue monitor/trend  -Low threshold to initiate antibiotics if concern for infection

## 2024-11-05 NOTE — PROGRESS NOTES
Patient inquiring about his cell phone. Per patient girlfriend, his phone is at home and not here.

## 2024-11-05 NOTE — CARE PLAN
The patient is Watcher - Medium risk of patient condition declining or worsening    Shift Goals  Clinical Goals: neuro checks, CIWA, monitor labs  Patient Goals: alyson  Family Goals: updates    Progress made toward(s) clinical / shift goals:      Problem: Optimal Care for Alcohol Withdrawal  Goal: Optimal Care for the alcohol withdrawal patient  Description: Target End Date:  1 to 3 days    1.  Alcohol history screening done on admission  2.  CIWA-AR score assessment (includes assessment of nausea/vomiting, tremor, sweats, anxiety, agitation, tactile, visual and auditory disturbances, headache and orientation/sensorium).  Document on CIWA flowsheet.  3.  Follow CIWA-AR score protocol  4.  Frequent reorientation  5.  Monitor for fluid and electrolyte imbalance.  6.  Assess for respiratory depression due to sedation (pulse ox)  7.  Consider thiamine, multivitamins, folic acid and magnesium sulfate per provider order  8.  Collaborate with Social Workers/Case Management  9.  Collaborate with mental health  Outcome: Progressing  Note: CIWA assessments as ordered. Seizure precautions in place.        Patient is not progressing towards the following goals:      Problem: Knowledge Deficit - Standard  Goal: Patient and family/care givers will demonstrate understanding of plan of care, disease process/condition, diagnostic tests and medications  Description: Target End Date:  1-3 days or as soon as patient condition allows    Document in Patient Education    1.  Patient and family/caregiver oriented to unit, equipment, visitation policy and means for communicating concern  2.  Complete/review Learning Assessment  3.  Assess knowledge level of disease process/condition, treatment plan, diagnostic tests and medications  4.  Explain disease process/condition, treatment plan, diagnostic tests and medications  Outcome: Not Met  Note: Discussed POC with patient and family, monitored labs and VS.     Problem: Neuro Status  Goal: Neuro  status will remain stable or improve  Description: Target End Date:  Prior to discharge or change in level of care    Document on Neuro assessment in the Assessment flowsheet    1.  Assess and monitor neurologic status per provider order/protocol/unit policy  2.  Assess level of consciousness and orientation  3.  Assess for speech, dysarthria, dysphagia, facial symmetry  4.  Assess visual field, eye movements, gaze preference, pupil reaction and size  5.  Assess muscle strength and motor response in all four extremities  6.  Assess for sensation (numbness and tingling)  7.  Assess basic neuro reflexes (cough, gag, corneal)  8.  Identify changes in neuro status and report to provider for testing/treatment orders  Outcome: Not Met  Note: A&Ox1, Q4H neuro checks as ordered, see flowsheets for documentation.

## 2024-11-05 NOTE — ASSESSMENT & PLAN NOTE
Assessment: CK elevated at 492 U/L, possibly due to alcohol myopathy or prolonged immobility.  Plan: Ensure adequate hydration. Encourage mobilization as tolerated.

## 2024-11-05 NOTE — PROGRESS NOTES
4 Eyes Skin Assessment Completed by DAMARIS Mitchell and DAMARIS Hayward.    Head Blanching and Redness       L face    Ears WDL  Nose WDL  Mouth WDL  Neck WDL  Breast/Chest WDL  Shoulder Blades Redness, DTI   R shoulder     L shoulder     R scapula    Spine Redness and blanching    (R) Arm/Elbow/Hand Redness and Blanching slow     (L) Arm/Elbow/Hand Redness and Blanching slow      Abdomen WDL  Groin WDL  Scrotum/Coccyx/Buttocks WDL  (R) Leg Bruising   R knee    (L) Leg Bruising and Abrasion    L knee    (R) Heel/Foot/Toe Redness and Blanching slow      (L) Heel/Foot/Toe Redness and Blanching slow            Devices In Places Tele Box, Pulse Ox, and Condom Cath      Interventions In Place Heel Mepilex, Sacral Mepilex, TAP System, Pillows, Q2 Turns, and Low Air Loss Mattress    Possible Skin Injury Yes    Pictures Uploaded Into Epic Yes  Wound Consult Placed Yes  RN Wound Prevention Protocol Ordered Yes

## 2024-11-05 NOTE — CARE PLAN
The patient is Watcher - Medium risk of patient condition declining or worsening    Shift Goals  Clinical Goals: neuro checks, CIWA  Patient Goals: comfort  Family Goals: alyson    Progress made toward(s) clinical / shift goals:    Problem: Optimal Care for Alcohol Withdrawal  Goal: Optimal Care for the alcohol withdrawal patient  Description: Target End Date:  1 to 3 days    1.  Alcohol history screening done on admission  2.  CIWA-AR score assessment (includes assessment of nausea/vomiting, tremor, sweats, anxiety, agitation, tactile, visual and auditory disturbances, headache and orientation/sensorium).  Document on CIWA flowsheet.  3.  Follow CIWA-AR score protocol  4.  Frequent reorientation  5.  Monitor for fluid and electrolyte imbalance.  6.  Assess for respiratory depression due to sedation (pulse ox)  7.  Consider thiamine, multivitamins, folic acid and magnesium sulfate per provider order  8.  Collaborate with Social Workers/Case Management  9.  Collaborate with mental health  Outcome: Progressing  Note: CIWA scoring continued on patient.      Problem: Risk for Aspiration  Goal: Patient's risk for aspiration will be absent or decrease  Description: Target End Date:  Prior to discharge or change in level of care    1.   Complete dysphagia screening on admission  2.   NPO until dysphagia screening complete or medically cleared  3.   Collaborate with Speech Therapy, Clinical Dietitian and interdisciplinary team  4.   Implement aspiration precautions  5.   Assist patient up to chair for meals  6.   Elevate head of bed 90 degrees if patient is unable to get out of bed  7.   Encourage small bites  8.   Ensure foods/liquids are of appropriate consistency  9.   Assess for any signs/symptoms of aspiration  10. Assess breath sounds and vital signs after oral intake  Outcome: Progressing  Flowsheets (Taken 11/5/2024 9434)  Head Of Bed: Greater than 30 degrees  Note: Aspiration precautions in place.      Problem: Neuro  Status  Goal: Neuro status will remain stable or improve  Description: Target End Date:  Prior to discharge or change in level of care    Document on Neuro assessment in the Assessment flowsheet    1.  Assess and monitor neurologic status per provider order/protocol/unit policy  2.  Assess level of consciousness and orientation  3.  Assess for speech, dysarthria, dysphagia, facial symmetry  4.  Assess visual field, eye movements, gaze preference, pupil reaction and size  5.  Assess muscle strength and motor response in all four extremities  6.  Assess for sensation (numbness and tingling)  7.  Assess basic neuro reflexes (cough, gag, corneal)  8.  Identify changes in neuro status and report to provider for testing/treatment orders  Outcome: Progressing  Note: Q4 neuro checks done on patient.        Patient is not progressing towards the following goals:      Problem: Knowledge Deficit - Standard  Goal: Patient and family/care givers will demonstrate understanding of plan of care, disease process/condition, diagnostic tests and medications  Description: Target End Date:  1-3 days or as soon as patient condition allows    Document in Patient Education    1.  Patient and family/caregiver oriented to unit, equipment, visitation policy and means for communicating concern  2.  Complete/review Learning Assessment  3.  Assess knowledge level of disease process/condition, treatment plan, diagnostic tests and medications  4.  Explain disease process/condition, treatment plan, diagnostic tests and medications  Outcome: Not Progressing  Note: Patient shows no evidence of learning.

## 2024-11-05 NOTE — PROGRESS NOTES
[facility] Internal Medicine Daily Progress Note    Date of Service  11/5/2024    [facility] Team: R IM Blue Team   Attending: MICKEY Chilel MD  Senior Resident: Risa Julien MD  Intern: Robert Scales MD  Contact Number: [phone]      Chief Complaint  Raj Sharpe is a 44 y.o. male admitted 11/3/2024 with   Chief Complaint   Patient presents with    ALOC     BIBA pt was found down on the floor in his bathroom today by family checking on him. He lives alone, LKW this Thursday. Pt altered, GCS 11, some bruising to torso, no other obvious signs of trauma. NSR, 158BGL PTA. Pt has a hx of EtOH abuse.          Hospital Course  44-year-old male with a history of significant alcohol abuse, presented on 11/3/2024 with altered mental status. His girlfriend, who provided the history, found him minimally responsive in the bathroom after returning from out of town. He was last seen normal the previous Thursday. The patient had been experiencing unsteady gait and double vision two weeks prior and was consuming approximately 1.5 liters of vodka daily. He has a history of multiple admissions requiring aggressive CIWA protocol measures. CT head and chest X-ray showed no acute abnormalities.    The patient was initially admitted to the Intermediate Care Unit (IMCU) for management of encephalopathy with severe hypokalemia. Patient has been transferred to medicine floor on 11/05/2024.    Interval Problem Update  On examination, the patient is alert and oriented to name only and is pleasant. The patient exhibits both asterixis and tremors, and bilateral nystagmus.     Recheck of his labs this noon shows Phosphorous of 1.9, Lactic Acid of 2.3 (trending down), and normal potassium of 4.2. Patient to have IV Phosphorous and fluid repletion. Will continue to monitor labs.     I have discussed this patient's plan of care and discharge plan at IDT rounds today with Case Management, Nursing, Nursing leadership, and other members of  the IDT team.    Consultants/Specialty  None    Code Status  Full Code    Disposition  The patient is not medically cleared for discharge to home or a post-acute facility.      I have placed the appropriate orders for post-discharge needs.    Review of Systems  Review of Systems   Constitutional:  Negative for chills and fever.   HENT:  Negative for ear pain and sore throat.    Eyes:  Negative for blurred vision and pain.   Respiratory:  Negative for cough and shortness of breath.    Cardiovascular:  Negative for chest pain, palpitations and leg swelling.   Gastrointestinal:  Negative for abdominal pain, constipation, diarrhea, nausea and vomiting.   Genitourinary:  Negative for dysuria, frequency, hematuria and urgency.   Musculoskeletal:  Negative for falls and joint pain.   Skin:  Negative for rash.   Neurological:  Negative for seizures, loss of consciousness, weakness and headaches.   Endo/Heme/Allergies:  Negative for environmental allergies. Does not bruise/bleed easily.   Psychiatric/Behavioral:  Negative for depression, hallucinations and suicidal ideas.         Physical Exam  Temp:  [36 °C (96.8 °F)-36.4 °C (97.5 °F)] 36.3 °C (97.3 °F)  Pulse:  [67-88] 79  Resp:  [15-31] 16  BP: ()/(63-76) 125/73  SpO2:  [95 %-100 %] 95 %    Physical Exam  Constitutional:       General: He is not in acute distress.     Appearance: Normal appearance.   HENT:      Head: Normocephalic and atraumatic.      Right Ear: Ear canal and external ear normal.      Left Ear: Ear canal and external ear normal.      Nose: Nose normal. No congestion or rhinorrhea.      Mouth/Throat:      Mouth: Mucous membranes are moist.      Pharynx: Oropharynx is clear. No oropharyngeal exudate or posterior oropharyngeal erythema.   Eyes:      General: No scleral icterus.     Extraocular Movements: Extraocular movements intact.      Right eye: Nystagmus present.      Left eye: Nystagmus present.      Conjunctiva/sclera:      Right eye: Right  conjunctiva is not injected.      Left eye: Left conjunctiva is not injected.      Pupils: Pupils are equal, round, and reactive to light.   Cardiovascular:      Rate and Rhythm: Normal rate and regular rhythm.      Pulses: Normal pulses.      Heart sounds: No murmur heard.  Pulmonary:      Effort: Pulmonary effort is normal. No respiratory distress.      Breath sounds: Normal breath sounds.   Abdominal:      General: Abdomen is flat. Bowel sounds are normal. There is no distension.      Palpations: Abdomen is soft.      Tenderness: There is no abdominal tenderness.   Musculoskeletal:      Cervical back: Normal range of motion. No rigidity or tenderness.      Right lower leg: No edema.      Left lower leg: No edema.   Skin:     Coloration: Skin is not jaundiced.      Findings: No bruising.   Neurological:      General: No focal deficit present.      Mental Status: He is alert. He is disoriented.      Cranial Nerves: No cranial nerve deficit.      Motor: Tremor present.   Psychiatric:         Mood and Affect: Mood normal.         Behavior: Behavior normal.         Thought Content: Thought content normal.         Fluids    Intake/Output Summary (Last 24 hours) at 11/5/2024 1359  Last data filed at 11/5/2024 1200  Gross per 24 hour   Intake 1162.81 ml   Output 1500 ml   Net -337.19 ml       Laboratory  Recent Labs     11/04/24  0053 11/04/24  0518 11/05/24  0001   WBC 14.7* 17.9* 14.9*   RBC 3.20* 2.94* 2.84*   HEMOGLOBIN 10.8* 10.0* 9.9*   HEMATOCRIT 30.5* 28.6* 28.2*   MCV 95.3 97.3 99.3*   MCH 33.8* 34.0* 34.9*   MCHC 35.4 35.0 35.1   RDW 46.3 48.7 51.8*   PLATELETCT 170 173 151*   MPV 11.1 11.7 11.3     Recent Labs     11/04/24  0809 11/04/24  1650 11/05/24  0001 11/05/24  1156   SODIUM 135  --  136 137   POTASSIUM 3.4*  3.4* 2.9* 3.3* 4.2   CHLORIDE 98  --  101 104   CO2 22  --  23 21   GLUCOSE 69  --  97 91   BUN 20  --  9 8   CREATININE 0.72  --  0.50 0.47*   CALCIUM 9.0  --  9.1 8.7     Recent Labs      11/03/24 2115   INR 0.99               Imaging  US-RUQ   Final Result      1.  Hepatomegaly with hepatic steatosis      2.  Mildly thickened gallbladder wall which may be secondary to hypoproteinemia, hypoalbuminemia, or inflammation.      3.  Sludge-filled gallbladder.      CT-HEAD W/O   Final Result      1.  No evidence of acute intracranial process.      2.  Atrophy.               DX-CHEST-PORTABLE (1 VIEW)   Final Result      No evidence of acute cardiopulmonary process.              Assessment/Plan  Problem Representation:    * Altered mental status- (present on admission)  Assessment & Plan  Assessment: The patient presents with altered mental status, disorientation, and a history of alcohol abuse. No acute intracranial process was found on CT. Last known well was this past Thursday.  Based off signs and symptoms concern for underlying Warnicke's encephalopathy given extensive alcohol use (1.5 L of liquor daily).  Differential also includes hepatic encephalopathy in the setting of possible underlying cirrhosis.    Plan:   -Neurochecks every 4 hours   -Continue high-dose IV thiamine IV  -Check ammonia levels, if elevated will consider NG tube and lactulose/rifaximin   -Continue monitor mentation. Reassess mental status regularly.   -Ensure safety measures to prevent falls or injuries.    Hepatic steatosis  Assessment & Plan  Assessment: Ultrasound shows hepatic steatosis and hepatomegaly, likely related to chronic alcohol use.  Plan: Monitor liver function tests. Encourage alcohol cessation. Consider hepatology consultation if liver function worsens.    Elevated creatine kinase level  Assessment & Plan  Assessment: CK elevated at 492 U/L, possibly due to alcohol myopathy or prolonged immobility.  Plan: Ensure adequate hydration. Monitor CK levels and renal function. Encourage mobilization as tolerated.    Thrombocytopenia (HCC)  Assessment & Plan  Assessment: Reduced platelet count (151 K/uL) on 11/05/2024,  potentially related to alcohol use or liver dysfunction.  Plan: Monitor platelet levels. Assess for bleeding risks.    Anemia  Assessment & Plan  Assessment: Low hemoglobin (9.9 g/dL), likely due to nutritional deficiencies.  Plan: Check levels of vitamin B12 and folate. Supplement as necessary. Monitor complete blood count (CBC) and reticulocyte count.    Tobacco use  Assessment & Plan  Patient is a 30-pack-year history, still smoking 1 pack/day.  -Will need extensive counseling when clinically appropriate    Alcohol use  Assessment & Plan  Assessment: The patient has a significant history of alcohol abuse, consuming approximately 1.5 liters of vodka daily, with multiple prior admissions for withdrawal management.  Plan: Initiate [facility] protocol for withdrawal management. Administer thiamine and multivitamins. Provide counseling and discuss long-term treatment options for alcohol dependence, including referral to a rehabilitation program.    Prolonged QT interval  Assessment & Plan  Prolonged QTc of 543 noted on EKG, likely sequela of hyperkalemia.  -Telemetry monitoring  -Continue to optimize electrolytes (i.e. K and mag)    Transaminitis  Assessment & Plan  Mild transaminitis with AST of 124, ALT of 58 on presentation.  Alk phos also elevated at 318.  Likely alcoholic hepatitis in the setting of significant alcohol use.  Uncertain however likely that patient might have some form of liver disease secondary chronic alcohol abuse.  -Continue monitor/trend  -Obtaining RUQ ultrasound for further evaluation    Lactic acidosis- (present on admission)  Assessment & Plan  Assessment: Lactic acid initially elevated, now trending down, indicating possible metabolic stress.  Plan: Continue monitoring lactic acid levels. Ensure adequate oxygenation and perfusion. Adjust fluid management as needed.    Leukocytosis- (present on admission)  Assessment & Plan  Leukocytosis of 16.8 followed by 14.7 noted on presentation,  neutrophilic predominant.  No signs of active infection at this time, possible stress demargination in the setting of acute illness.  Received IV ceftriaxone in ED.  -Continue monitor/trend  -Low threshold to initiate antibiotics if concern for infection    Hypokalemia- (present on admission)  Assessment & Plan  RESOLVED. Patient had initial hypokalemia (now corrected to 4.2 mmol/L).         VTE prophylaxis: enoxaparin ppx    I have performed a physical exam and reviewed and updated ROS and Plan today (11/5/2024). In review of yesterday's note (11/4/2024), there are no changes except as documented above.

## 2024-11-05 NOTE — ASSESSMENT & PLAN NOTE
Assessment: The patient presents with altered mental status, disorientation, and a history of alcohol abuse concerning for toxic encephalopathy. No acute intracranial process was found on CT. Last known well was this past Thursday.  Based off signs and symptoms concern for underlying Warnicke's encephalopathy given extensive alcohol use (1.5 L of liquor daily).  Differential also includes progression to [place], versus  hepatic encephalopathy in the setting of possible underlying cirrhosis  Plan:   -Screen for syphilis, HIV, thyroid disorder  -Neurochecks every 4 hours   -Continue high-dose IV thiamine IV per pharmacy's recommendation for [place]off  -Ensure safety measures to prevent falls or injuries.

## 2024-11-05 NOTE — DISCHARGE PLANNING
Case Management Discharge Planning    Admission Date: 11/3/2024  GMLOS: 2.6  ALOS: 1    6-Clicks ADL Score: 16  6-Clicks Mobility Score: 12  PT and/or OT Eval ordered: No  Post-acute Referrals Ordered: NA  Post-acute Choice Obtained: NA  Has referral(s) been sent to post-acute provider:  HOLLIE      Anticipated Discharge Dispo: Discharge Disposition: Discharged to home/self care (01)    DME Needed: No    Action(s) Taken: Updated Provider/Nurse on Discharge Plan Patient discussed during IDT rounds with medical team.    Escalations Completed: None    Medically Clear: No    Next Steps: Case Management will continue to follow for discharge planning needs.    Barriers to Discharge: Medical clearance    Is the patient up for discharge tomorrow: No    RN Case Manager met with patient at bedside and obtained the information used in this assessment. Patient verified accuracy of facesheet; patient lives in a single story home with his spouse.  Prior to current hospitalization, patient was independent with ADLS/IADLS. Patient drives and is able to attend necessary MD appointments. Patient prefers to use [facility]. Patient has no financial concerns. Patient has support from his spouse. Denies any history of substance use and denies any diagnosis of mental illness.    Care Transition Team Assessment    Information Source: Patient  Orientation Level: Oriented to place, Oriented to person  Information Given By: Patient  Who is responsible for making decisions for patient? : Patient    Readmission Evaluation  Is this a readmission?: No    Elopement Risk  Legal Hold: No  Ambulatory or Self Mobile in Wheelchair: No-Not an Elopement Risk  Elopement Risk: Not at Risk for Elopement    Interdisciplinary Discharge Planning  Does Admitting Nurse Feel This Could be a Complex Discharge?: No  Primary Care Physician: KELI CUEVAS D.O.  Lives with - Patient's Self Care Capacity: Alone and Able to Care For Self  Patient or legal guardian wants  to designate a caregiver: No  Support Systems: Family Member(s), Friends / Neighbors, Spouse / Significant Other  Housing / Facility: 1 Story Apartment / Condo  Do You Take your Prescribed Medications Regularly: No  Reasons Why Not Taking Medications : Financial Reasons  Able to Return to Previous ADL's: Yes  Mobility Issues: No  Patient Prefers to be Discharged to:: Home  Assistance Needed: No    Discharge Preparedness  What is your plan after discharge?: Home with help  What are your discharge supports?: Spouse  Prior Functional Level: Independent with Activities of Daily Living, Drives Self, Ambulatory, Independent with Medication Management  Difficulity with ADLs: None  Difficulity with IADLs: None    Functional Assesment  Prior Functional Level: Independent with Activities of Daily Living, Drives Self, Ambulatory, Independent with Medication Management    Finances  Financial Barriers to Discharge: No  Prescription Coverage: No  Prescription Coverage Comments: No insurance    Vision / Hearing Impairment  Vision Impairment : No  Hearing Impairment : No    Values / Beliefs / Concerns  Values / Beliefs Concerns : No    Advance Directive  Advance Directive?: None  Advance Directive offered?: AD Booklet refused    Domestic Abuse  Possible Abuse/Neglect Reported to:: Not Applicable    Psychological Assessment  History of Substance Abuse: Alcohol  History of Psychiatric Problems: No    Discharge Risks or Barriers  Discharge risks or barriers?: Substance abuse  Patient risk factors: Substance abuse    Anticipated Discharge Information  Discharge Disposition: Discharged to home/self care (01)

## 2024-11-05 NOTE — PROGRESS NOTES
Lactate remains elevated, patient very sleepy, will order 1 liter NS and continue to trend. Likely related to acute ETOH withdrawal.

## 2024-11-06 ENCOUNTER — APPOINTMENT (OUTPATIENT)
Dept: RADIOLOGY | Facility: MEDICAL CENTER | Age: 44
DRG: 897 | End: 2024-11-06
Payer: MEDICAID

## 2024-11-06 LAB
ALBUMIN SERPL BCP-MCNC: 2.9 G/DL (ref 3.2–4.9)
ALBUMIN/GLOB SERPL: 1.2 G/DL
ALP SERPL-CCNC: 240 U/L (ref 30–99)
ALT SERPL-CCNC: 65 U/L (ref 2–50)
ANION GAP SERPL CALC-SCNC: 9 MMOL/L (ref 7–16)
AST SERPL-CCNC: 159 U/L (ref 12–45)
BACTERIA UR CULT: NORMAL
BASOPHILS # BLD AUTO: 0.5 % (ref 0–1.8)
BASOPHILS # BLD: 0.04 K/UL (ref 0–0.12)
BILIRUB SERPL-MCNC: 0.6 MG/DL (ref 0.1–1.5)
BUN SERPL-MCNC: 5 MG/DL (ref 8–22)
CALCIUM ALBUM COR SERPL-MCNC: 9.9 MG/DL (ref 8.5–10.5)
CALCIUM SERPL-MCNC: 9 MG/DL (ref 8.5–10.5)
CHLORIDE SERPL-SCNC: 107 MMOL/L (ref 96–112)
CK SERPL-CCNC: 360 U/L (ref 0–154)
CK SERPL-CCNC: 369 U/L (ref 0–154)
CO2 SERPL-SCNC: 20 MMOL/L (ref 20–33)
CREAT SERPL-MCNC: 0.5 MG/DL (ref 0.5–1.4)
EOSINOPHIL # BLD AUTO: 0.11 K/UL (ref 0–0.51)
EOSINOPHIL NFR BLD: 1.2 % (ref 0–6.9)
ERYTHROCYTE [DISTWIDTH] IN BLOOD BY AUTOMATED COUNT: 55.5 FL (ref 35.9–50)
FERRITIN SERPL-MCNC: 1153 NG/ML (ref 22–322)
GFR SERPLBLD CREATININE-BSD FMLA CKD-EPI: 129 ML/MIN/1.73 M 2
GLOBULIN SER CALC-MCNC: 2.4 G/DL (ref 1.9–3.5)
GLUCOSE SERPL-MCNC: 86 MG/DL (ref 65–99)
HCT VFR BLD AUTO: 26.6 % (ref 42–52)
HGB BLD-MCNC: 8.9 G/DL (ref 14–18)
HGB RETIC QN AUTO: 33.5 PG/CELL (ref 29–35)
IMM GRANULOCYTES # BLD AUTO: 0.22 K/UL (ref 0–0.11)
IMM GRANULOCYTES NFR BLD AUTO: 2.5 % (ref 0–0.9)
IMM RETICS NFR: 10.7 % (ref 2.6–16.1)
IRON SATN MFR SERPL: 20 % (ref 15–55)
IRON SERPL-MCNC: 38 UG/DL (ref 50–180)
LACTATE SERPL-SCNC: 2.5 MMOL/L (ref 0.5–2)
LACTATE SERPL-SCNC: 2.6 MMOL/L (ref 0.5–2)
LACTATE SERPL-SCNC: 2.8 MMOL/L (ref 0.5–2)
LACTATE SERPL-SCNC: 2.8 MMOL/L (ref 0.5–2)
LYMPHOCYTES # BLD AUTO: 2.89 K/UL (ref 1–4.8)
LYMPHOCYTES NFR BLD: 32.7 % (ref 22–41)
MAGNESIUM SERPL-MCNC: 1.8 MG/DL (ref 1.5–2.5)
MCH RBC QN AUTO: 34.5 PG (ref 27–33)
MCHC RBC AUTO-ENTMCNC: 33.5 G/DL (ref 32.3–36.5)
MCV RBC AUTO: 103.1 FL (ref 81.4–97.8)
MONOCYTES # BLD AUTO: 0.62 K/UL (ref 0–0.85)
MONOCYTES NFR BLD AUTO: 7 % (ref 0–13.4)
NEUTROPHILS # BLD AUTO: 4.95 K/UL (ref 1.82–7.42)
NEUTROPHILS NFR BLD: 56.1 % (ref 44–72)
NRBC # BLD AUTO: 0.04 K/UL
NRBC BLD-RTO: 0.5 /100 WBC (ref 0–0.2)
PHOSPHATE SERPL-MCNC: 3.4 MG/DL (ref 2.5–4.5)
PLATELET # BLD AUTO: 125 K/UL (ref 164–446)
PLATELET # BLD AUTO: 142 K/UL (ref 164–446)
PMV BLD AUTO: 10.9 FL (ref 9–12.9)
POTASSIUM SERPL-SCNC: 4.7 MMOL/L (ref 3.6–5.5)
PROT SERPL-MCNC: 5.3 G/DL (ref 6–8.2)
RBC # BLD AUTO: 2.58 M/UL (ref 4.7–6.1)
RETICS # AUTO: 0.04 M/UL (ref 0.04–0.12)
RETICS/RBC NFR: 1.7 % (ref 0.8–2.6)
SIGNIFICANT IND 70042: NORMAL
SITE SITE: NORMAL
SODIUM SERPL-SCNC: 136 MMOL/L (ref 135–145)
SOURCE SOURCE: NORMAL
TIBC SERPL-MCNC: 192 UG/DL (ref 250–450)
UIBC SERPL-MCNC: 154 UG/DL (ref 110–370)
WBC # BLD AUTO: 8.8 K/UL (ref 4.8–10.8)

## 2024-11-06 PROCEDURE — 80053 COMPREHEN METABOLIC PANEL: CPT

## 2024-11-06 PROCEDURE — 83735 ASSAY OF MAGNESIUM: CPT

## 2024-11-06 PROCEDURE — A9270 NON-COVERED ITEM OR SERVICE: HCPCS

## 2024-11-06 PROCEDURE — 700111 HCHG RX REV CODE 636 W/ 250 OVERRIDE (IP)

## 2024-11-06 PROCEDURE — 83550 IRON BINDING TEST: CPT

## 2024-11-06 PROCEDURE — 82728 ASSAY OF FERRITIN: CPT

## 2024-11-06 PROCEDURE — 97163 PT EVAL HIGH COMPLEX 45 MIN: CPT

## 2024-11-06 PROCEDURE — 700105 HCHG RX REV CODE 258

## 2024-11-06 PROCEDURE — 700102 HCHG RX REV CODE 250 W/ 637 OVERRIDE(OP)

## 2024-11-06 PROCEDURE — 97167 OT EVAL HIGH COMPLEX 60 MIN: CPT

## 2024-11-06 PROCEDURE — 84100 ASSAY OF PHOSPHORUS: CPT

## 2024-11-06 PROCEDURE — 83540 ASSAY OF IRON: CPT

## 2024-11-06 PROCEDURE — 770020 HCHG ROOM/CARE - TELE (206)

## 2024-11-06 PROCEDURE — 36415 COLL VENOUS BLD VENIPUNCTURE: CPT

## 2024-11-06 PROCEDURE — 85025 COMPLETE CBC W/AUTO DIFF WBC: CPT

## 2024-11-06 PROCEDURE — 82550 ASSAY OF CK (CPK): CPT | Mod: 91

## 2024-11-06 PROCEDURE — A9270 NON-COVERED ITEM OR SERVICE: HCPCS | Performed by: STUDENT IN AN ORGANIZED HEALTH CARE EDUCATION/TRAINING PROGRAM

## 2024-11-06 PROCEDURE — 700102 HCHG RX REV CODE 250 W/ 637 OVERRIDE(OP): Performed by: STUDENT IN AN ORGANIZED HEALTH CARE EDUCATION/TRAINING PROGRAM

## 2024-11-06 PROCEDURE — 85046 RETICYTE/HGB CONCENTRATE: CPT

## 2024-11-06 PROCEDURE — 99232 SBSQ HOSP IP/OBS MODERATE 35: CPT | Mod: GC | Performed by: HOSPITALIST

## 2024-11-06 PROCEDURE — 83605 ASSAY OF LACTIC ACID: CPT

## 2024-11-06 PROCEDURE — 700102 HCHG RX REV CODE 250 W/ 637 OVERRIDE(OP): Performed by: HOSPITALIST

## 2024-11-06 PROCEDURE — A9270 NON-COVERED ITEM OR SERVICE: HCPCS | Performed by: HOSPITALIST

## 2024-11-06 PROCEDURE — 700105 HCHG RX REV CODE 258: Performed by: NURSE PRACTITIONER

## 2024-11-06 RX ORDER — MAGNESIUM SULFATE 1 G/100ML
1 INJECTION INTRAVENOUS ONCE
Status: COMPLETED | OUTPATIENT
Start: 2024-11-06 | End: 2024-11-06

## 2024-11-06 RX ORDER — PANTOPRAZOLE SODIUM 40 MG/10ML
40 INJECTION, POWDER, LYOPHILIZED, FOR SOLUTION INTRAVENOUS 2 TIMES DAILY
Status: DISCONTINUED | OUTPATIENT
Start: 2024-11-06 | End: 2024-11-07

## 2024-11-06 RX ADMIN — THERA TABS 1 TABLET: TAB at 05:20

## 2024-11-06 RX ADMIN — THIAMINE HYDROCHLORIDE 500 MG: 100 INJECTION, SOLUTION INTRAMUSCULAR; INTRAVENOUS at 16:58

## 2024-11-06 RX ADMIN — SODIUM CHLORIDE: 9 INJECTION, SOLUTION INTRAVENOUS at 13:04

## 2024-11-06 RX ADMIN — LORAZEPAM 2 MG: 2 TABLET ORAL at 07:35

## 2024-11-06 RX ADMIN — LORAZEPAM 0.5 MG: 1 TABLET ORAL at 05:25

## 2024-11-06 RX ADMIN — LORAZEPAM 0.5 MG: 1 TABLET ORAL at 20:17

## 2024-11-06 RX ADMIN — NICOTINE TRANSDERMAL SYSTEM 21 MG: 21 PATCH, EXTENDED RELEASE TRANSDERMAL at 05:25

## 2024-11-06 RX ADMIN — LORAZEPAM 2 MG: 2 TABLET ORAL at 14:12

## 2024-11-06 RX ADMIN — THIAMINE HYDROCHLORIDE 500 MG: 100 INJECTION, SOLUTION INTRAMUSCULAR; INTRAVENOUS at 08:52

## 2024-11-06 RX ADMIN — MAGNESIUM SULFATE IN DEXTROSE 1 G: 10 INJECTION, SOLUTION INTRAVENOUS at 10:50

## 2024-11-06 RX ADMIN — FOLIC ACID 1 MG: 1 TABLET ORAL at 05:20

## 2024-11-06 RX ADMIN — LORAZEPAM 1 MG: 1 TABLET ORAL at 09:37

## 2024-11-06 RX ADMIN — LORAZEPAM 1 MG: 1 TABLET ORAL at 15:33

## 2024-11-06 RX ADMIN — PANTOPRAZOLE SODIUM 40 MG: 40 INJECTION, POWDER, FOR SOLUTION INTRAVENOUS at 17:46

## 2024-11-06 ASSESSMENT — ENCOUNTER SYMPTOMS
HALLUCINATIONS: 0
DEPRESSION: 0
FEVER: 0
HEADACHES: 0
ABDOMINAL PAIN: 0
SEIZURES: 0
NAUSEA: 0
SHORTNESS OF BREATH: 0
SORE THROAT: 0
DIARRHEA: 0
WEAKNESS: 0
CHILLS: 0
BLURRED VISION: 0
BRUISES/BLEEDS EASILY: 0
COUGH: 0
EYE PAIN: 0
LOSS OF CONSCIOUSNESS: 0
PALPITATIONS: 0
CONSTIPATION: 0
VOMITING: 0
FALLS: 0

## 2024-11-06 ASSESSMENT — COGNITIVE AND FUNCTIONAL STATUS - GENERAL
SUGGESTED CMS G CODE MODIFIER DAILY ACTIVITY: CK
DRESSING REGULAR LOWER BODY CLOTHING: A LOT
CLIMB 3 TO 5 STEPS WITH RAILING: TOTAL
HELP NEEDED FOR BATHING: A LOT
SUGGESTED CMS G CODE MODIFIER MOBILITY: CL
MOVING TO AND FROM BED TO CHAIR: A LOT
MOBILITY SCORE: 12
TURNING FROM BACK TO SIDE WHILE IN FLAT BAD: A LITTLE
EATING MEALS: A LITTLE
WALKING IN HOSPITAL ROOM: TOTAL
TOILETING: A LOT
DRESSING REGULAR UPPER BODY CLOTHING: A LOT
MOVING FROM LYING ON BACK TO SITTING ON SIDE OF FLAT BED: A LITTLE
DAILY ACTIVITIY SCORE: 14
STANDING UP FROM CHAIR USING ARMS: A LOT
PERSONAL GROOMING: A LITTLE

## 2024-11-06 ASSESSMENT — LIFESTYLE VARIABLES
TREMOR: NO TREMOR
VISUAL DISTURBANCES: NOT PRESENT
TREMOR: TREMOR NOT VISIBLE BUT CAN BE FELT, FINGERTIP TO FINGERTIP
PAROXYSMAL SWEATS: NO SWEAT VISIBLE
ANXIETY: *
ANXIETY: NO ANXIETY (AT EASE)
TOTAL SCORE: 14
TOTAL SCORE: 10
NAUSEA AND VOMITING: NO NAUSEA AND NO VOMITING
VISUAL DISTURBANCES: NOT PRESENT
HEADACHE, FULLNESS IN HEAD: NOT PRESENT
ORIENTATION AND CLOUDING OF SENSORIUM: DISORIENTED FOR PLACE AND / OR PERSON
AGITATION: SOMEWHAT MORE THAN NORMAL ACTIVITY
TOTAL SCORE: 7
AUDITORY DISTURBANCES: NOT PRESENT
VISUAL DISTURBANCES: NOT PRESENT
TOTAL SCORE: 6
NAUSEA AND VOMITING: NO NAUSEA AND NO VOMITING
PAROXYSMAL SWEATS: NO SWEAT VISIBLE
ORIENTATION AND CLOUDING OF SENSORIUM: DISORIENTED FOR PLACE AND / OR PERSON
ANXIETY: MILDLY ANXIOUS
NAUSEA AND VOMITING: NO NAUSEA AND NO VOMITING
PAROXYSMAL SWEATS: NO SWEAT VISIBLE
TREMOR: NO TREMOR
TOTAL SCORE: 4
ANXIETY: MODERATELY ANXIOUS OR GUARDED, SO ANXIETY IS INFERRED
VISUAL DISTURBANCES: NOT PRESENT
PAROXYSMAL SWEATS: BARELY PERCEPTIBLE SWEATING. PALMS MOIST
HEADACHE, FULLNESS IN HEAD: NOT PRESENT
ANXIETY: MODERATELY ANXIOUS OR GUARDED, SO ANXIETY IS INFERRED
AUDITORY DISTURBANCES: NOT PRESENT
NAUSEA AND VOMITING: NO NAUSEA AND NO VOMITING
NAUSEA AND VOMITING: MILD NAUSEA WITH NO VOMITING
PAROXYSMAL SWEATS: *
TREMOR: TREMOR NOT VISIBLE BUT CAN BE FELT, FINGERTIP TO FINGERTIP
AGITATION: NORMAL ACTIVITY
NAUSEA AND VOMITING: NO NAUSEA AND NO VOMITING
TOTAL SCORE: 11
PAROXYSMAL SWEATS: BARELY PERCEPTIBLE SWEATING. PALMS MOIST
TOTAL SCORE: 8
AGITATION: NORMAL ACTIVITY
TOTAL SCORE: 9
TREMOR: TREMOR NOT VISIBLE BUT CAN BE FELT, FINGERTIP TO FINGERTIP
VISUAL DISTURBANCES: NOT PRESENT
ORIENTATION AND CLOUDING OF SENSORIUM: DATE DISORIENTATION BY MORE THAN TWO CALENDAR DAYS
TREMOR: NO TREMOR
ANXIETY: MILDLY ANXIOUS
HEADACHE, FULLNESS IN HEAD: NOT PRESENT
VISUAL DISTURBANCES: NOT PRESENT
HEADACHE, FULLNESS IN HEAD: NOT PRESENT
AUDITORY DISTURBANCES: NOT PRESENT
TREMOR: NO TREMOR
AGITATION: SOMEWHAT MORE THAN NORMAL ACTIVITY
AGITATION: NORMAL ACTIVITY
AUDITORY DISTURBANCES: NOT PRESENT
PAROXYSMAL SWEATS: NO SWEAT VISIBLE
VISUAL DISTURBANCES: NOT PRESENT
ORIENTATION AND CLOUDING OF SENSORIUM: DISORIENTED FOR PLACE AND / OR PERSON
AGITATION: MODERATELY FIDGETY AND RESTLESS
HEADACHE, FULLNESS IN HEAD: NOT PRESENT
NAUSEA AND VOMITING: NO NAUSEA AND NO VOMITING
AUDITORY DISTURBANCES: NOT PRESENT
NAUSEA AND VOMITING: NO NAUSEA AND NO VOMITING
TOTAL SCORE: 2
ANXIETY: MILDLY ANXIOUS
TREMOR: TREMOR NOT VISIBLE BUT CAN BE FELT, FINGERTIP TO FINGERTIP
ORIENTATION AND CLOUDING OF SENSORIUM: DISORIENTED FOR PLACE AND / OR PERSON
AUDITORY DISTURBANCES: NOT PRESENT
ORIENTATION AND CLOUDING OF SENSORIUM: DISORIENTED FOR PLACE AND / OR PERSON
ANXIETY: *
ORIENTATION AND CLOUDING OF SENSORIUM: DISORIENTED FOR PLACE AND / OR PERSON
NAUSEA AND VOMITING: NO NAUSEA AND NO VOMITING
TREMOR: TREMOR NOT VISIBLE BUT CAN BE FELT, FINGERTIP TO FINGERTIP
ANXIETY: *
HEADACHE, FULLNESS IN HEAD: NOT PRESENT
PAROXYSMAL SWEATS: BARELY PERCEPTIBLE SWEATING. PALMS MOIST
AGITATION: NORMAL ACTIVITY
HEADACHE, FULLNESS IN HEAD: NOT PRESENT
ORIENTATION AND CLOUDING OF SENSORIUM: DISORIENTED FOR PLACE AND / OR PERSON
AUDITORY DISTURBANCES: NOT PRESENT
VISUAL DISTURBANCES: NOT PRESENT
VISUAL DISTURBANCES: NOT PRESENT
PAROXYSMAL SWEATS: BARELY PERCEPTIBLE SWEATING. PALMS MOIST
TOTAL SCORE: VERY MILD ITCHING, PINS AND NEEDLES SENSATION, BURNING OR NUMBNESS
ORIENTATION AND CLOUDING OF SENSORIUM: CANNOT DO SERIAL ADDITIONS OR IS UNCERTAIN ABOUT DATE
AUDITORY DISTURBANCES: NOT PRESENT
AUDITORY DISTURBANCES: NOT PRESENT
AGITATION: NORMAL ACTIVITY
AGITATION: SOMEWHAT MORE THAN NORMAL ACTIVITY
HEADACHE, FULLNESS IN HEAD: NOT PRESENT
HEADACHE, FULLNESS IN HEAD: NOT PRESENT

## 2024-11-06 ASSESSMENT — GAIT ASSESSMENTS: GAIT LEVEL OF ASSIST: UNABLE TO PARTICIPATE

## 2024-11-06 ASSESSMENT — PAIN DESCRIPTION - PAIN TYPE: TYPE: ACUTE PAIN

## 2024-11-06 ASSESSMENT — FIBROSIS 4 INDEX: FIB4 SCORE: 3.5

## 2024-11-06 NOTE — PROGRESS NOTES
Patient girlfriend, Sarah called and informed of patient fall. All questions answered. She expressed understanding.

## 2024-11-06 NOTE — CARE PLAN
The patient is Watcher - Medium risk of patient condition declining or worsening    Shift Goals  Clinical Goals: Q4 Neuro checks, Monotor vitals, Safety  Patient Goals: Rest  Family Goals: EDUARDO    Progress made toward(s) clinical / shift goals:          Problem: Optimal Care for Alcohol Withdrawal  Goal: Optimal Care for the alcohol withdrawal patient  Description: Target End Date:  1 to 3 days    1.  Alcohol history screening done on admission  2.  CIWA-AR score assessment (includes assessment of nausea/vomiting, tremor, sweats, anxiety, agitation, tactile, visual and auditory disturbances, headache and orientation/sensorium).  Document on CIWA flowsheet.  3.  Follow CIWA-AR score protocol  4.  Frequent reorientation  5.  Monitor for fluid and electrolyte imbalance.  6.  Assess for respiratory depression due to sedation (pulse ox)  7.  Consider thiamine, multivitamins, folic acid and magnesium sulfate per provider order  8.  Collaborate with Social Workers/Case Management  9.  Collaborate with mental health  Outcome: Progressing  Note: Patient was scored for CIWA according to protocol. Was medicated according to protocol.      Problem: Neuro Status  Goal: Neuro status will remain stable or improve  Description: Target End Date:  Prior to discharge or change in level of care    Document on Neuro assessment in the Assessment flowsheet    1.  Assess and monitor neurologic status per provider order/protocol/unit policy  2.  Assess level of consciousness and orientation  3.  Assess for speech, dysarthria, dysphagia, facial symmetry  4.  Assess visual field, eye movements, gaze preference, pupil reaction and size  5.  Assess muscle strength and motor response in all four extremities  6.  Assess for sensation (numbness and tingling)  7.  Assess basic neuro reflexes (cough, gag, corneal)  8.  Identify changes in neuro status and report to provider for testing/treatment orders  Outcome: Progressing  Note: Patient was assesed  q4 hours. No changes noted. Remained aware of self. Nystagmus remained present

## 2024-11-06 NOTE — THERAPY
Occupational Therapy   Initial Evaluation     Patient Name: Raj Sharpe  Age:  44 y.o., Sex:  male  Medical Record #: [id number]  Today's Date: 11/6/2024     Precautions: Fall Risk  Comments: Buckling of BLE    Assessment  Patient is 44 y.o. male admitted with AMS and severe hypokalemia. Per pt's SO, pt had c/o double vision and unsteadiness with mobility around 2 weeks ago. CT head with no acute findings. PMHx of alcohol use disorder (1.5 L of vodka daily) and TRISTAN. Pt seen for OT eval. Pt is confused and appears to be a poor historian. Pt reported that he currently resides with his step-father in a house w/ a walk in shower in [place], [place]. Stated that his SO lives down the street from him. Pt stated that he was independent with ADLs and IADLs and that he was working as a  for high [employer] and as driving.    During OT eval, pt presented with deficits in self-care tasks, balance, functional mobility, cognition, strength, and activity tolerance. He demo difficulty following verbal commands, but demo improvements when he was provided with a visual demonstration. He required min A to mobilize to EOB where he was commonly seen leaning towards his left side prompting cues to shift weight towards the right to achieve upright position. He required min-max A to complete ADL tasks due to cognition. He was able to complete STS x2 with mod A and 2 person assist for safety due to previous reports of knee buckling. Currently recommend post-acute placement prior to DC home. Will continue to follow for ongoing acute OT services.    Plan    Occupational Therapy Initial Treatment Plan   Treatment Interventions: Self Care / Activities of Daily Living, Adaptive Equipment, Neuro Re-Education / Balance, Therapeutic Exercises, Therapeutic Activity  Treatment Frequency: 3 Times per Week  Duration: Until Therapy Goals Met    DC Equipment Recommendations: Unable to determine at this time  Discharge  "Recommendations: Recommend post-acute placement for additional occupational therapy services prior to discharge home     Subjective    \"I need to go to work.\" \"What time does school start?\"      Objective    Prior Living Situation   Comments Pt appears to be confused and is a poor historian. Pt reported that he currently resides with his step-father in a house w/ a walk in shower in [place], [place]. Stated that his SO lives down the street from him. Pt stated that he was independent with ADLs and IADLs and that he was working as a  for [employer] and as driving.   Prior Level of ADL Function   Comments Pt stated that he was independent with ADLs PTA   Prior Level of IADL Function   Comments Pt stated that he was independent with IADLs PTA   History of Falls   History of Falls Yes   Date of Last Fall   (Pt was found unrepsonsive in bathroom leading to admission and had a fall with RN this AM)   Precautions   Precautions Fall Risk   Comments Buckling of BLE   Vitals   O2 Delivery Device None - Room Air   Pain 0 - 10 Group   Therapist Pain Assessment Post Activity Pain Same as Prior to Activity;Nurse Notified  (No c/o pain during session)   Cognition    Cognition / Consciousness X   Orientation Level Not Oriented to Reason;Not Oriented to Place;Not Oriented to Age   Level of Consciousness Alert   Ability To Follow Commands 1 Step   Safety Awareness Impaired;Impulsive   New Learning Impaired   Attention Impaired   Sequencing Impaired   Initiation Impaired   Comments Pt is confused and appears to be a poor historian. He commonly asked if he needed to go to work and what time school starts. Required visual demonstration in order to complete tasks   Active ROM Upper Body   Active ROM Upper Body  WDL   Dominant Hand Ambidextrous   Strength Upper Body   Upper Body Strength  X   Gross Strength Generalized Weakness, Equal Bilaterally.    Coordination Upper Body   Comments Able to complete with visual " demonstration and increased time   Balance Assessment   Sitting Balance (Static) Fair -   Sitting Balance (Dynamic) Poor +   Standing Balance (Static) Trace   Standing Balance (Dynamic) Dependent   Weight Shift Sitting Good   Weight Shift Standing Poor   Comments w/BUE support   Bed Mobility    Supine to Sit Minimal Assist   Sit to Supine Minimal Assist   Scooting Minimal Assist   Rolling Contact Guard Assist   Comments HOB slightly elevated; required increased time and multimodal cues   ADL Assessment   Eating Minimal Assist  (Required assist with opening containers, dressing food, and cutting food)   Grooming Minimal Assist;Seated  (Asisst with balance)   Upper Body Dressing Moderate Assist   Lower Body Dressing Maximal Assist   Toileting   (Condom cath)   How much help from another person does the patient currently need...   6 Clicks Daily Activity Score 14   Functional Mobility   Sit to Stand Moderate Assist   Bed, Chair, Wheelchair Transfer Unable to Participate   Mobility EOB ADLs; STS x2 w/BUE; x2 person assist for safety   Activity Tolerance   Sitting Edge of Bed 6 min   Standing 1 min   Comments Limited by cognition, balance, and generalized weakness   Patient / Family Goals   Patient / Family Goal #1 To go to work   Short Term Goals   Short Term Goal # 1 Pt will complete ADL txfs with supv   Short Term Goal # 2 Pt will complete FB dressing with supv using AE PRN   Short Term Goal # 3 Pt will complete toileting ADLs with min A   Short Term Goal # 4 Pt will complete seated g/h routine with supv   Education Group   Education Provided Role of Occupational Therapist   Role of Occupational Therapist Patient Response Patient;Explanation;No Learning Evidence     Pt seen for OT andrew in coordination with PT due to the need for two skilled therapists due to anticipation of complex presentation, cognitive deficits, and limited mobility

## 2024-11-06 NOTE — PROGRESS NOTES
[facility] Internal Medicine Daily Progress Note    Date of Service  11/6/2024    R Team: R IM Blue Team   Attending: NICOLE Chilel M.d.  Senior Resident: Risa Julien MD  Intern: Robert Scales MD  Contact Number: [phone]      Chief Complaint  Raj Sharpe is a 44 y.o. male admitted 11/3/2024 with   Chief Complaint   Patient presents with    ALOC     BIBA pt was found down on the floor in his bathroom today by family checking on him. He lives alone, LKW this Thursday. Pt altered, GCS 11, some bruising to torso, no other obvious signs of trauma. NSR, 158BGL PTA. Pt has a hx of EtOH abuse.          Hospital Course  44-year-old male with a history of significant alcohol abuse, presented on 11/3/2024 with altered mental status. His girlfriend, who provided the history, found him minimally responsive in the bathroom after returning from out of town. He was last seen normal the previous Thursday. The patient had been experiencing unsteady gait and double vision two weeks prior and was consuming approximately 1.5 liters of vodka daily. He has a history of multiple admissions requiring aggressive CIWA protocol measures. CT head and chest X-ray showed no acute abnormalities.    The patient was initially admitted to the Intermediate Care Unit (IMCU) for management of encephalopathy with severe hypokalemia. Patient has been transferred to medicine floor on 11/05/2024.    Interval Problem Update  According to the nurse, the patient fell while getting out of bed, which resulted in the dislodgement of the IV lines. It is unclear whether the patient intentionally removed the IV access or if they came out during the fall, as the incident was not witnessed. The patient denies experiencing any pain in the knees or legs and also denies any head trauma. On physical examination, the lower extremities and knee joints show no signs of erythema, tenderness, or warmth. The head is atraumatic.    Patient's CIWA Scores  today ranges from 14 to 9.    I have discussed this patient's plan of care and discharge plan at IDT rounds today with Case Management, Nursing, Nursing leadership, and other members of the IDT team.    Consultants/Specialty  None    Code Status  Full Code    Disposition  The patient is not medically cleared for discharge to home or a post-acute facility.      I have placed the appropriate orders for post-discharge needs.    Review of Systems  Review of Systems   Constitutional:  Negative for chills and fever.   HENT:  Negative for ear pain and sore throat.    Eyes:  Negative for blurred vision and pain.   Respiratory:  Negative for cough and shortness of breath.    Cardiovascular:  Negative for chest pain, palpitations and leg swelling.   Gastrointestinal:  Negative for abdominal pain, constipation, diarrhea, nausea and vomiting.   Genitourinary:  Negative for dysuria, frequency, hematuria and urgency.   Musculoskeletal:  Negative for falls and joint pain.   Skin:  Negative for rash.   Neurological:  Negative for seizures, loss of consciousness, weakness and headaches.   Endo/Heme/Allergies:  Negative for environmental allergies. Does not bruise/bleed easily.   Psychiatric/Behavioral:  Negative for depression, hallucinations and suicidal ideas.         Physical Exam  Temp:  [36.2 °C (97.2 °F)-36.4 °C (97.5 °F)] 36.4 °C (97.5 °F)  Pulse:  [67-79] 76  Resp:  [16] 16  BP: ()/(67-85) 111/75  SpO2:  [95 %-100 %] 100 %    Physical Exam  Constitutional:       General: He is not in acute distress.     Appearance: Normal appearance.   HENT:      Head: Normocephalic and atraumatic.      Right Ear: Ear canal and external ear normal.      Left Ear: Ear canal and external ear normal.      Nose: Nose normal. No congestion or rhinorrhea.      Mouth/Throat:      Mouth: Mucous membranes are moist.      Pharynx: Oropharynx is clear. No oropharyngeal exudate or posterior oropharyngeal erythema.   Eyes:      General: No scleral  icterus.     Extraocular Movements: Extraocular movements intact.      Right eye: Nystagmus present.      Left eye: Nystagmus present.      Conjunctiva/sclera:      Right eye: Right conjunctiva is not injected.      Left eye: Left conjunctiva is not injected.      Pupils: Pupils are equal, round, and reactive to light.   Cardiovascular:      Rate and Rhythm: Normal rate and regular rhythm.      Pulses: Normal pulses.      Heart sounds: No murmur heard.  Pulmonary:      Effort: Pulmonary effort is normal. No respiratory distress.      Breath sounds: Normal breath sounds.   Abdominal:      General: Abdomen is flat. Bowel sounds are normal. There is no distension.      Palpations: Abdomen is soft.      Tenderness: There is no abdominal tenderness.   Musculoskeletal:      Cervical back: Normal range of motion. No rigidity or tenderness.      Right lower leg: No edema.      Left lower leg: No edema.   Skin:     Coloration: Skin is not jaundiced.      Findings: No bruising.   Neurological:      General: No focal deficit present.      Mental Status: He is alert. He is disoriented.      Cranial Nerves: No cranial nerve deficit.      Motor: Tremor present.   Psychiatric:         Mood and Affect: Mood normal.         Behavior: Behavior normal.         Thought Content: Thought content normal.         Fluids    Intake/Output Summary (Last 24 hours) at 11/6/2024 0701  Last data filed at 11/6/2024 0500  Gross per 24 hour   Intake 500 ml   Output 2725 ml   Net -2225 ml       Laboratory  Recent Labs     11/04/24  0518 11/05/24  0001 11/06/24  0454   WBC 17.9* 14.9* 8.8   RBC 2.94* 2.84* 2.58*   HEMOGLOBIN 10.0* 9.9* 8.9*   HEMATOCRIT 28.6* 28.2* 26.6*   MCV 97.3 99.3* 103.1*   MCH 34.0* 34.9* 34.5*   MCHC 35.0 35.1 33.5   RDW 48.7 51.8* 55.5*   PLATELETCT 173 151* 142*   MPV 11.7 11.3 10.9     Recent Labs     11/04/24  0809 11/04/24  1650 11/05/24  0001 11/05/24  1156   SODIUM 135  --  136 137   POTASSIUM 3.4*  3.4* 2.9* 3.3* 4.2    CHLORIDE 98  --  101 104   CO2 22  --  23 21   GLUCOSE 69  --  97 91   BUN 20  --  9 8   CREATININE 0.72  --  0.50 0.47*   CALCIUM 9.0  --  9.1 8.7     Recent Labs     11/03/24  2115   INR 0.99               .antol  Imaging  US-RUQ   Final Result      1.  Hepatomegaly with hepatic steatosis      2.  Mildly thickened gallbladder wall which may be secondary to hypoproteinemia, hypoalbuminemia, or inflammation.      3.  Sludge-filled gallbladder.      CT-HEAD W/O   Final Result      1.  No evidence of acute intracranial process.      2.  Atrophy.               DX-CHEST-PORTABLE (1 VIEW)   Final Result      No evidence of acute cardiopulmonary process.           Assessment/Plan  Problem Representation:    * Altered mental status- (present on admission)  Assessment & Plan  Assessment: The patient presents with altered mental status, disorientation, and a history of alcohol abuse. No acute intracranial process was found on CT. Last known well was this past Thursday.  Based off signs and symptoms concern for underlying Warnicke's encephalopathy given extensive alcohol use (1.5 L of liquor daily).  Differential also includes hepatic encephalopathy in the setting of possible underlying cirrhosis.    Plan:   -Neurochecks every 4 hours   -Continue high-dose IV thiamine IV  -Check ammonia levels, if elevated will consider NG tube and lactulose/rifaximin   -Continue monitor mentation. Reassess mental status regularly.   -Ensure safety measures to prevent falls or injuries.    Hepatic steatosis  Assessment & Plan  Assessment: Ultrasound shows hepatic steatosis and hepatomegaly, likely related to chronic alcohol use.  Plan: Monitor liver function tests. Encourage alcohol cessation. Consider hepatology consultation if liver function worsens.    Elevated creatine kinase level  Assessment & Plan  Assessment: CK elevated at 492 U/L, possibly due to alcohol myopathy or prolonged immobility.  Plan: Ensure adequate hydration. Monitor  CK levels and renal function. Encourage mobilization as tolerated.    Thrombocytopenia (HCC)  Assessment & Plan  Assessment: Reduced platelet count (151 K/uL) on 11/05/2024, potentially related to alcohol use or liver dysfunction.  Plan: Monitor platelet levels. Assess for bleeding risks.    Anemia  Assessment & Plan  Assessment: Low hemoglobin (9.9 g/dL), likely due to nutritional deficiencies.  Plan: Check levels of vitamin B12 and folate. Supplement as necessary. Monitor complete blood count (CBC) and reticulocyte count.    Tobacco use  Assessment & Plan  Patient is a 30-pack-year history, still smoking 1 pack/day.  -Will need extensive counseling when clinically appropriate    Alcohol use  Assessment & Plan  Assessment: The patient has a significant history of alcohol abuse, consuming approximately 1.5 liters of vodka daily, with multiple prior admissions for withdrawal management.  Plan: Initiate [facility] protocol for withdrawal management. Administer thiamine and multivitamins. Provide counseling and discuss long-term treatment options for alcohol dependence, including referral to a rehabilitation program.    Prolonged QT interval  Assessment & Plan  Prolonged QTc of 543 noted on EKG, likely sequela of hyperkalemia.  -Telemetry monitoring  -Continue to optimize electrolytes (i.e. K and mag)    Transaminitis  Assessment & Plan  Mild transaminitis with AST of 124, ALT of 58 on presentation.  Alk phos also elevated at 318.  Likely alcoholic hepatitis in the setting of significant alcohol use.  Uncertain however likely that patient might have some form of liver disease secondary chronic alcohol abuse.  -Continue monitor/trend  -Obtaining RUQ ultrasound for further evaluation    Lactic acidosis- (present on admission)  Assessment & Plan  Assessment: Lactic acid initially elevated, now trending down, indicating possible metabolic stress.  Plan: Continue monitoring lactic acid levels. Ensure adequate oxygenation and  perfusion. Adjust fluid management as needed.    Leukocytosis- (present on admission)  Assessment & Plan  Leukocytosis of 16.8 followed by 14.7 noted on presentation, neutrophilic predominant.  No signs of active infection at this time, possible stress demargination in the setting of acute illness.  Received IV ceftriaxone in ED.  -Continue monitor/trend  -Low threshold to initiate antibiotics if concern for infection    Hypokalemia- (present on admission)  Assessment & Plan  RESOLVED. Patient had initial hypokalemia (now corrected to 4.2 mmol/L).         VTE prophylaxis: enoxaparin ppx    I have performed a physical exam and reviewed and updated ROS and Plan today (11/6/2024). In review of yesterday's note (11/5/2024), there are no changes except as documented above.

## 2024-11-06 NOTE — PROGRESS NOTES
SisterReny called for an update this morning. Provider Robert Scales called sister to update her on patient condition.

## 2024-11-06 NOTE — THERAPY
Physical Therapy   Initial Evaluation     Patient Name: Raj Sharpe  Age:  44 y.o., Sex:  male  Medical Record #: [id number]  Today's Date: 11/6/2024     Precautions  Precautions: Fall Risk  Comments: Buckling of BLE    Assessment  Patient is 44 y.o. male presenting after being found minimally responsive in the bathroom by GF after she returned from out of town. Pt found to have encephalopathy with severe hypokalemia. Per chart, pt consumes approximately 1.5 liters of vodka daily. Pt with PMH including significant alcohol abuse, tobacco use. Pt was a very poor historian but is likely independent with functional mobility at baseline using no AD based on chart review. Per chart, pt lives in [place] with his SO. Recommend confirming home setup/PLOF info with family. RN reported that pt had a fall this morning trying to get OOB. Pt had no recall of this fall when asked.    During current session, pt presents with confusion, impaired cog, generalized weakness with BLE buckling, impaired balance, and decreased endurance requiring overall min-mod A x2 people for mobility as detailed below. Performed sit<>stand x1 time from EOB with 2 person assist, further mobility limited by BLE buckling. Pt unable to retain any edu at this time. Recommend post-acute placement to address these significant functional deficits below baseline. Pt would benefit from continued acute PT services to improve functional mobility prior to d/c.    Plan    Physical Therapy Initial Treatment Plan   Treatment Plan : Bed Mobility, Equipment, Family / Caregiver Training, Gait Training, Manual Therapy, Neuro Re-Education / Balance, Self Care / Home Evaluation, Stair Training, Therapeutic Activities, Therapeutic Exercise  Treatment Frequency: 3 Times per Week  Duration: Until Therapy Goals Met    DC Equipment Recommendations: Unable to determine at this time  Discharge Recommendations: Recommend post-acute placement for additional physical therapy  "services prior to discharge home       Subjective    Pt received asleep in bed, woken with loud verbal cues and light sternal rub. Agreeable to participate. \"I don't have to work today?\"     Objective       11/06/24 1033   Initial Contact Note    Initial Contact Note Order Received and Verified, Physical Therapy Evaluation in Progress with Full Report to Follow.   Precautions   Precautions Fall Risk   Comments Buckling of BLE   Vitals   O2 Delivery Device None - Room Air   Pain 0 - 10 Group   Therapist Pain Assessment Post Activity Pain Same as Prior to Activity;Nurse Notified  (no c/o pain)   Prior Living Situation   Prior Services None   Housing / Facility Unable To Determine At This Time   Equipment Owned Unable to Determine At This Time   Lives with - Patient's Self Care Capacity Unable To Determine At This Time   Comments Pt was a very poor historian. Reported that he lives in a 1SH no stairs with his stepfather in [place], [place] and that his SO lives down the street. 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 Per chart review, pt lives in [place] with his SO. Recommend confirming with family   Prior Level of Functional Mobility   Bed Mobility Independent   Transfer Status Independent   Ambulation Independent   Ambulation Distance limited community?   Assistive Devices Used None   Stairs Independent   Comments see above, PLOF info assumed based on chart   History of Falls   History of Falls Yes   Date of Last Fall   (related to admit, pt found down in bathroom)   Cognition    Cognition / Consciousness X   Speech/ Communication Delayed Responses   Level of Consciousness Alert   Ability To Follow Commands 1 Step   Safety Awareness Impaired;Impulsive   New Learning Impaired   Attention Impaired   Sequencing Impaired   Initiation Impaired   Comments confused, calm, asking multiple times if he has to go to work and when school starts   Passive ROM Lower Body   Passive ROM Lower Body WDL   Active " ROM Lower Body    Active ROM Lower Body  WDL   Strength Lower Body   Lower Body Strength  X   Gross Strength Generalized Weakness, Equal Bilaterally   Comments unable to formally assess d/t cog, BLE buckling at EOB   Sensation Lower Body   Comments no c/o altered sensation BLE   Coordination Lower Body    Coordination Lower Body  X   Comments slow and weak   Balance Assessment   Sitting Balance (Static) Fair -   Sitting Balance (Dynamic) Poor +   Standing Balance (Static) Trace +   Standing Balance (Dynamic) Trace   Weight Shift Sitting Fair   Weight Shift Standing Poor   Comments BUE support x2 people   Bed Mobility    Supine to Sit Minimal Assist   Sit to Supine Minimal Assist   Scooting Minimal Assist   Comments HOBE slightly   Gait Analysis   Gait Level Of Assist Unable to Participate   Functional Mobility   Sit to Stand Moderate Assist  (x2 person assist)   Bed, Chair, Wheelchair Transfer Unable to Participate   Mobility standing EOB only   Comments performed sit<>stand x1 time from EOB with 2 person assist, further mobility limited by BLE buckling   6 Clicks Assessment - How much HELP from from another person do you currently need... (If the patient hasn't done an activity recently, how much help from another person do you think he/she would need if he/she tried?)   Turning from your back to your side while in a flat bed without using bedrails? 3   Moving from lying on your back to sitting on the side of a flat bed without using bedrails? 3   Moving to and from a bed to a chair (including a wheelchair)? 2   Standing up from a chair using your arms (e.g., wheelchair, or bedside chair)? 2   Walking in hospital room? 1   Climbing 3-5 steps with a railing? 1   6 clicks Mobility Score 12   Patient / Family Goals    Patient / Family Goal #1   (pt unable to state)   Short Term Goals    Short Term Goal # 1 Pt will perform supine<>sit with SPV to progress bed mobility in 6 visits.   Short Term Goal # 2 Pt will perform  sit<>stand and stand step txfer with FWW vs no AD and SPV to progress OOB mobility in 6 visits.   Short Term Goal # 3 Pt will ambulate 150 ft with FWW vs no AD and SPV to access home in 6 visits.   Education Group   Education Provided Role of Physical Therapist   Role of Physical Therapist Patient Response Patient;Acceptance;Explanation;Demonstration;Reinforcement Needed;No Learning Evidence   Physical Therapy Initial Treatment Plan    Treatment Plan  Bed Mobility;Equipment;Family / Caregiver Training;Gait Training;Manual Therapy;Neuro Re-Education / Balance;Self Care / Home Evaluation;Stair Training;Therapeutic Activities;Therapeutic Exercise   Treatment Frequency 3 Times per Week   Duration Until Therapy Goals Met   Problem List    Problems Impaired Bed Mobility;Impaired Transfers;Impaired Ambulation;Functional Strength Deficit;Impaired Balance;Decreased Activity Tolerance;Safety Awareness Deficits / Cognition   Anticipated Discharge Equipment and Recommendations   DC Equipment Recommendations Unable to determine at this time   Discharge Recommendations Recommend post-acute placement for additional physical therapy services prior to discharge home   Interdisciplinary Plan of Care Collaboration   IDT Collaboration with  Nursing;Occupational Therapist   Patient Position at End of Therapy In Bed;Bed Alarm On;Self Releasing Lap Belt Applied;Call Light within Reach;Tray Table within Reach;Phone within Reach  (telesitter in room)   Collaboration Comments RN and OT updated   Session Information   Date / Session Number  11/6- 1(1/3, 11/12)     Patient seen for team evaluation with Occupational Therapist for the following reason(s):  Patient required 2 person assistance for safety and to provide effective interventions. Each discipline assisted patient with appropriate and separate goals. Due to the medical complexity, the skill of both practitioners is needed to monitor vitals, patient status, and adjust the intervention  to fit the patient's needs and goals. Therapy sessions needed to be done by a certain time period for both disciplines, and did not impede patient's progress. Pt had a fall this morning thus benefited from 2 skilled clinician assist for safety.  Physical Therapist facilitated bed mobility, balance, and sit<>stand training while Occupational Therapist simultaneously treated pt according to POC.

## 2024-11-06 NOTE — PROGRESS NOTES
Bedside RN received report (Elsy FIERRO). Left to get report on another patient and then bed frame alarm sounded a minute or two after. (Bed strip alarm not appropriate d/t patient having current skin injuries which outweighed the need for a bed strip alarm) Another RN ran into the room witnessing the patient kneeling on the floor. RN unable to grab him at this time as the patient removed his PIV and had blood on the floor. RN unable to grab the patient. Patient stated needing to go to bathroom but was reminded of him having a condom cath at this time.       Per DAMARIS Chin - MD notified, no imaging at this time. Patient denies pain - VSS. Family notified.    Lap Belt & Telesitter implemented d/t increased confusion and impulsiveness.

## 2024-11-07 LAB
ALBUMIN SERPL BCP-MCNC: 2.9 G/DL (ref 3.2–4.9)
ALBUMIN/GLOB SERPL: 1.2 G/DL
ALP SERPL-CCNC: 265 U/L (ref 30–99)
ALT SERPL-CCNC: 82 U/L (ref 2–50)
ANION GAP SERPL CALC-SCNC: 8 MMOL/L (ref 7–16)
AST SERPL-CCNC: 171 U/L (ref 12–45)
BILIRUB SERPL-MCNC: 0.6 MG/DL (ref 0.1–1.5)
BUN SERPL-MCNC: 4 MG/DL (ref 8–22)
CALCIUM ALBUM COR SERPL-MCNC: 9.5 MG/DL (ref 8.5–10.5)
CALCIUM SERPL-MCNC: 8.6 MG/DL (ref 8.5–10.5)
CHLORIDE SERPL-SCNC: 107 MMOL/L (ref 96–112)
CO2 SERPL-SCNC: 21 MMOL/L (ref 20–33)
CREAT SERPL-MCNC: 0.49 MG/DL (ref 0.5–1.4)
ERYTHROCYTE [DISTWIDTH] IN BLOOD BY AUTOMATED COUNT: 54.8 FL (ref 35.9–50)
FOLATE SERPL-MCNC: 2.9 NG/ML
GFR SERPLBLD CREATININE-BSD FMLA CKD-EPI: 130 ML/MIN/1.73 M 2
GLOBULIN SER CALC-MCNC: 2.4 G/DL (ref 1.9–3.5)
GLUCOSE SERPL-MCNC: 94 MG/DL (ref 65–99)
HCT VFR BLD AUTO: 27.4 % (ref 42–52)
HGB BLD-MCNC: 9.1 G/DL (ref 14–18)
MAGNESIUM SERPL-MCNC: 1.8 MG/DL (ref 1.5–2.5)
MCH RBC QN AUTO: 34.1 PG (ref 27–33)
MCHC RBC AUTO-ENTMCNC: 33.2 G/DL (ref 32.3–36.5)
MCV RBC AUTO: 102.6 FL (ref 81.4–97.8)
PHOSPHATE SERPL-MCNC: 3.5 MG/DL (ref 2.5–4.5)
PLATELET # BLD AUTO: 147 K/UL (ref 164–446)
PMV BLD AUTO: 11.1 FL (ref 9–12.9)
POTASSIUM SERPL-SCNC: 4.2 MMOL/L (ref 3.6–5.5)
PROT SERPL-MCNC: 5.3 G/DL (ref 6–8.2)
RBC # BLD AUTO: 2.67 M/UL (ref 4.7–6.1)
SODIUM SERPL-SCNC: 136 MMOL/L (ref 135–145)
WBC # BLD AUTO: 6.8 K/UL (ref 4.8–10.8)

## 2024-11-07 PROCEDURE — 700102 HCHG RX REV CODE 250 W/ 637 OVERRIDE(OP): Performed by: HOSPITALIST

## 2024-11-07 PROCEDURE — 700105 HCHG RX REV CODE 258: Performed by: NURSE PRACTITIONER

## 2024-11-07 PROCEDURE — 700111 HCHG RX REV CODE 636 W/ 250 OVERRIDE (IP)

## 2024-11-07 PROCEDURE — 700102 HCHG RX REV CODE 250 W/ 637 OVERRIDE(OP)

## 2024-11-07 PROCEDURE — A9270 NON-COVERED ITEM OR SERVICE: HCPCS

## 2024-11-07 PROCEDURE — 700102 HCHG RX REV CODE 250 W/ 637 OVERRIDE(OP): Performed by: STUDENT IN AN ORGANIZED HEALTH CARE EDUCATION/TRAINING PROGRAM

## 2024-11-07 PROCEDURE — A9270 NON-COVERED ITEM OR SERVICE: HCPCS | Performed by: STUDENT IN AN ORGANIZED HEALTH CARE EDUCATION/TRAINING PROGRAM

## 2024-11-07 PROCEDURE — 99232 SBSQ HOSP IP/OBS MODERATE 35: CPT | Mod: GC | Performed by: HOSPITALIST

## 2024-11-07 PROCEDURE — 82746 ASSAY OF FOLIC ACID SERUM: CPT

## 2024-11-07 PROCEDURE — 85027 COMPLETE CBC AUTOMATED: CPT

## 2024-11-07 PROCEDURE — 84100 ASSAY OF PHOSPHORUS: CPT

## 2024-11-07 PROCEDURE — A9270 NON-COVERED ITEM OR SERVICE: HCPCS | Performed by: HOSPITALIST

## 2024-11-07 PROCEDURE — 36415 COLL VENOUS BLD VENIPUNCTURE: CPT

## 2024-11-07 PROCEDURE — 83735 ASSAY OF MAGNESIUM: CPT

## 2024-11-07 PROCEDURE — 770020 HCHG ROOM/CARE - TELE (206)

## 2024-11-07 PROCEDURE — 86480 TB TEST CELL IMMUN MEASURE: CPT

## 2024-11-07 PROCEDURE — 92610 EVALUATE SWALLOWING FUNCTION: CPT

## 2024-11-07 PROCEDURE — 80053 COMPREHEN METABOLIC PANEL: CPT

## 2024-11-07 RX ORDER — BISACODYL 10 MG
10 SUPPOSITORY, RECTAL RECTAL
Status: DISCONTINUED | OUTPATIENT
Start: 2024-11-07 | End: 2024-11-11 | Stop reason: HOSPADM

## 2024-11-07 RX ORDER — OMEPRAZOLE 20 MG/1
20 CAPSULE, DELAYED RELEASE ORAL DAILY
Status: DISCONTINUED | OUTPATIENT
Start: 2024-11-08 | End: 2024-11-11 | Stop reason: HOSPADM

## 2024-11-07 RX ORDER — FOLIC ACID 1 MG/1
5 TABLET ORAL DAILY
Status: DISCONTINUED | OUTPATIENT
Start: 2024-11-07 | End: 2024-11-11 | Stop reason: HOSPADM

## 2024-11-07 RX ORDER — THIAMINE HYDROCHLORIDE 100 MG/ML
100 INJECTION, SOLUTION INTRAMUSCULAR; INTRAVENOUS 3 TIMES DAILY
Status: DISCONTINUED | OUTPATIENT
Start: 2024-11-07 | End: 2024-11-08

## 2024-11-07 RX ORDER — POLYETHYLENE GLYCOL 3350 17 G/17G
1 POWDER, FOR SOLUTION ORAL DAILY
Status: DISCONTINUED | OUTPATIENT
Start: 2024-11-07 | End: 2024-11-11 | Stop reason: HOSPADM

## 2024-11-07 RX ORDER — GAUZE BANDAGE 2" X 2"
100 BANDAGE TOPICAL DAILY
Status: DISCONTINUED | OUTPATIENT
Start: 2024-11-13 | End: 2024-11-08

## 2024-11-07 RX ADMIN — LORAZEPAM 2 MG: 2 TABLET ORAL at 12:08

## 2024-11-07 RX ADMIN — Medication 100 MG: at 05:35

## 2024-11-07 RX ADMIN — LORAZEPAM 2 MG: 2 TABLET ORAL at 14:08

## 2024-11-07 RX ADMIN — NICOTINE TRANSDERMAL SYSTEM 21 MG: 21 PATCH, EXTENDED RELEASE TRANSDERMAL at 05:35

## 2024-11-07 RX ADMIN — SODIUM CHLORIDE: 9 INJECTION, SOLUTION INTRAVENOUS at 04:29

## 2024-11-07 RX ADMIN — LORAZEPAM 1 MG: 1 TABLET ORAL at 08:12

## 2024-11-07 RX ADMIN — FOLIC ACID 5 MG: 1 TABLET ORAL at 07:07

## 2024-11-07 RX ADMIN — LORAZEPAM 1 MG: 1 TABLET ORAL at 16:16

## 2024-11-07 RX ADMIN — LORAZEPAM 0.5 MG: 1 TABLET ORAL at 03:52

## 2024-11-07 RX ADMIN — LORAZEPAM 1.5 MG: 2 INJECTION INTRAMUSCULAR; INTRAVENOUS at 13:12

## 2024-11-07 RX ADMIN — POLYETHYLENE GLYCOL 3350 1 PACKET: 17 POWDER, FOR SOLUTION ORAL at 12:08

## 2024-11-07 RX ADMIN — THIAMINE HYDROCHLORIDE 100 MG: 100 INJECTION, SOLUTION INTRAMUSCULAR; INTRAVENOUS at 18:26

## 2024-11-07 RX ADMIN — THERA TABS 1 TABLET: TAB at 05:35

## 2024-11-07 RX ADMIN — PANTOPRAZOLE SODIUM 40 MG: 40 INJECTION, POWDER, FOR SOLUTION INTRAVENOUS at 05:36

## 2024-11-07 RX ADMIN — THIAMINE HYDROCHLORIDE 100 MG: 100 INJECTION, SOLUTION INTRAMUSCULAR; INTRAVENOUS at 23:25

## 2024-11-07 RX ADMIN — FOLIC ACID 1 MG: 1 TABLET ORAL at 05:35

## 2024-11-07 ASSESSMENT — LIFESTYLE VARIABLES
TREMOR: *
NAUSEA AND VOMITING: NO NAUSEA AND NO VOMITING
HEADACHE, FULLNESS IN HEAD: NOT PRESENT
TREMOR: *
ORIENTATION AND CLOUDING OF SENSORIUM: DISORIENTED FOR PLACE AND / OR PERSON
AGITATION: MODERATELY FIDGETY AND RESTLESS
ANXIETY: MILDLY ANXIOUS
TOTAL SCORE: 17
ORIENTATION AND CLOUDING OF SENSORIUM: DISORIENTED FOR PLACE AND / OR PERSON
NAUSEA AND VOMITING: NO NAUSEA AND NO VOMITING
AGITATION: NORMAL ACTIVITY
VISUAL DISTURBANCES: NOT PRESENT
HEADACHE, FULLNESS IN HEAD: VERY MILD
ANXIETY: MILDLY ANXIOUS
NAUSEA AND VOMITING: NO NAUSEA AND NO VOMITING
AGITATION: NORMAL ACTIVITY
HEADACHE, FULLNESS IN HEAD: MILD
AUDITORY DISTURBANCES: NOT PRESENT
HEADACHE, FULLNESS IN HEAD: NOT PRESENT
VISUAL DISTURBANCES: NOT PRESENT
TREMOR: NO TREMOR
TREMOR: *
NAUSEA AND VOMITING: NO NAUSEA AND NO VOMITING
ANXIETY: NO ANXIETY (AT EASE)
PAROXYSMAL SWEATS: NO SWEAT VISIBLE
ANXIETY: *
ANXIETY: *
ORIENTATION AND CLOUDING OF SENSORIUM: DISORIENTED FOR PLACE AND / OR PERSON
TOTAL SCORE: 14
TREMOR: *
PAROXYSMAL SWEATS: BARELY PERCEPTIBLE SWEATING. PALMS MOIST
AUDITORY DISTURBANCES: NOT PRESENT
AUDITORY DISTURBANCES: NOT PRESENT
TOTAL SCORE: 12
TOTAL SCORE: 5
ORIENTATION AND CLOUDING OF SENSORIUM: DISORIENTED FOR PLACE AND / OR PERSON
AUDITORY DISTURBANCES: NOT PRESENT
AGITATION: SOMEWHAT MORE THAN NORMAL ACTIVITY
PAROXYSMAL SWEATS: BARELY PERCEPTIBLE SWEATING. PALMS MOIST
AGITATION: NORMAL ACTIVITY
VISUAL DISTURBANCES: NOT PRESENT
HEADACHE, FULLNESS IN HEAD: NOT PRESENT
ANXIETY: MILDLY ANXIOUS
NAUSEA AND VOMITING: MILD NAUSEA WITH NO VOMITING
AGITATION: NORMAL ACTIVITY
PAROXYSMAL SWEATS: NO SWEAT VISIBLE
ORIENTATION AND CLOUDING OF SENSORIUM: DISORIENTED FOR PLACE AND / OR PERSON
HEADACHE, FULLNESS IN HEAD: NOT PRESENT
TREMOR: *
TOTAL SCORE: VERY MILD ITCHING, PINS AND NEEDLES SENSATION, BURNING OR NUMBNESS
AGITATION: NORMAL ACTIVITY
VISUAL DISTURBANCES: NOT PRESENT
AGITATION: NORMAL ACTIVITY
NAUSEA AND VOMITING: NO NAUSEA AND NO VOMITING
HEADACHE, FULLNESS IN HEAD: NOT PRESENT
VISUAL DISTURBANCES: NOT PRESENT
PAROXYSMAL SWEATS: NO SWEAT VISIBLE
ORIENTATION AND CLOUDING OF SENSORIUM: ORIENTED AND CAN DO SERIAL ADDITIONS
ORIENTATION AND CLOUDING OF SENSORIUM: DISORIENTED FOR PLACE AND / OR PERSON
AUDITORY DISTURBANCES: NOT PRESENT
AUDITORY DISTURBANCES: NOT PRESENT
ANXIETY: MODERATELY ANXIOUS OR GUARDED, SO ANXIETY IS INFERRED
TREMOR: NO TREMOR
ANXIETY: MODERATELY ANXIOUS OR GUARDED, SO ANXIETY IS INFERRED
TOTAL SCORE: 5
PAROXYSMAL SWEATS: BARELY PERCEPTIBLE SWEATING. PALMS MOIST
TOTAL SCORE: 8
TREMOR: NO TREMOR
TOTAL SCORE: 10
AUDITORY DISTURBANCES: NOT PRESENT
HEADACHE, FULLNESS IN HEAD: VERY MILD
AUDITORY DISTURBANCES: NOT PRESENT
ORIENTATION AND CLOUDING OF SENSORIUM: DISORIENTED FOR PLACE AND / OR PERSON
VISUAL DISTURBANCES: NOT PRESENT
NAUSEA AND VOMITING: NO NAUSEA AND NO VOMITING
NAUSEA AND VOMITING: NO NAUSEA AND NO VOMITING
PAROXYSMAL SWEATS: NO SWEAT VISIBLE
PAROXYSMAL SWEATS: BARELY PERCEPTIBLE SWEATING. PALMS MOIST
TOTAL SCORE: 0
VISUAL DISTURBANCES: NOT PRESENT
VISUAL DISTURBANCES: NOT PRESENT

## 2024-11-07 ASSESSMENT — ENCOUNTER SYMPTOMS
ABDOMINAL PAIN: 0
COUGH: 0
NAUSEA: 0
SHORTNESS OF BREATH: 0
CHILLS: 0
VOMITING: 0
SEIZURES: 0
BLURRED VISION: 0
FEVER: 0
PALPITATIONS: 0
LOSS OF CONSCIOUSNESS: 0
HEADACHES: 0
DIARRHEA: 0
HALLUCINATIONS: 0
SORE THROAT: 0
EYE PAIN: 0
FALLS: 0
BRUISES/BLEEDS EASILY: 0
CONSTIPATION: 0
DEPRESSION: 0
WEAKNESS: 0

## 2024-11-07 ASSESSMENT — FIBROSIS 4 INDEX: FIB4 SCORE: 5.52

## 2024-11-07 ASSESSMENT — PATIENT HEALTH QUESTIONNAIRE - PHQ9
2. FEELING DOWN, DEPRESSED, IRRITABLE, OR HOPELESS: NOT AT ALL
1. LITTLE INTEREST OR PLEASURE IN DOING THINGS: NOT AT ALL
SUM OF ALL RESPONSES TO PHQ9 QUESTIONS 1 AND 2: 0

## 2024-11-07 ASSESSMENT — PAIN DESCRIPTION - PAIN TYPE
TYPE: ACUTE PAIN
TYPE: ACUTE PAIN

## 2024-11-07 NOTE — DISCHARGE PLANNING
Case Management Discharge Planning    Admission Date: 11/3/2024  GMLOS: 3.3  ALOS: 3    6-Clicks ADL Score: 14  6-Clicks Mobility Score: 12  PT and/or OT Eval ordered: Yes  Post-acute Referrals Ordered: Yes  Post-acute Choice Obtained: No  Has referral(s) been sent to post-acute provider:  No      Anticipated Discharge Dispo: Discharge Disposition: Discharged to home/self care (01)    DME Needed: No    Action(s) Taken: Updated Provider/Nurse on Discharge Plan, SNF referrals sent, patient has no insurance. [facility] # [id number]    Escalations Completed: None    Medically Clear: No    Next Steps: Case Management will continue to follow for discharge planning needs.    Barriers to Discharge: Medical clearance and Pending Placement    Is the patient up for discharge tomorrow: No

## 2024-11-07 NOTE — THERAPY
Speech Language Pathology   Clinical Swallow Evaluation     Patient Name: Raj Sharpe  AGE:  44 y.o., SEX:  male  Medical Record #: [id number]  Date of Service: 11/7/2024      History of Present Illness  The patient is a 43 y/o male who presented with AMS and encephalopathy.     PMHx:   Alcohol abuse    Head CT negative for acute intracranial process.     CXR negative for acute cardiopulmonary process.     No prior SLP documented in Epic    General Information:  Vitals  O2 Delivery Device: None - Room Air  Level of Consciousness: Alert, Awake  Patient Behaviors: Confused     Follows Directives: Yes - simple commands only      Prior Living Situation & Level of Function:  Lives with - Patient's Self Care Capacity: Significant Other (Reports that he lives with his girlfriend)     Communication: WFL  Swallowing: WFL       Oral Mechanism Evaluation:  Dentition: Fair, Natural dentition   Facial Symmetry: Equal        Labial Observations: Pt did not follow commands to assess   Lingual Observations: Midline  Motor Speech: Slow slurred speech            Laryngeal Function:  Secretion Management: Adequate  Voice Quality: Other (see comments) (quiet)        Cough: Perceptually weak    Assessment  Current Method of Nutrition: Oral diet (Regular/Thins)  Positioning: Guerra's (60-90 degrees)  Bolus Administration: SLP, Patient    O2 Delivery Device: None - Room Air  Factor(s) Affecting Performance: Impaired endurance, Impaired mental status, Impaired command following      Swallowing Trials:  Swallowing Trials  Thin Liquid (TN0): WFL  Pureed (PU4): WFL  Soft & Bite Sized (SB6): Impaired  Regular (RG7): WFL      Comments: Patient seen for a clinical swallow evaluation.  Per RN, patient with difficulty swallowing water and pills.  Patient awake and alert upon entering the room.  Vocal quality was clear but very reduced in intensity.  He inconsistently followed directives to the oral [facility] exam with no gross deficits  appreciated.  Presentation of PO included thin liquids via cup and straw, pudding, soft solids, mixed consistencies, and regular solids (hansel cracker).  Patient required direct spoon and cup feeding assistance but was able to bring the straw to his lips.  Patient generated adequate intraoral pressure to transition liquids from the cup via straw.  He was noted to swish liquids around the oral cavity and inconsistently required cues to initiate the swallow (liquids only).  Mastication was prolonged but appeared adequate.  He had reflexive throat clearing with mixed consistencies, which may be concerning for airway invasion.  Delayed oral clearance with solids (regular and soft) but cleared with a liquid wash.        Clinical Impressions  Patient presented with clinical signs of pharyngeal dysphagia, likely acute, related to encephalopathy.  He is at high risk for decompensation of swallowing function, given history of alcohol use (1.5L of liquor/day).  Diet modification is indicated.  Patient may benefit from an instrumental swallow study with any change in pulmonary function or s/sx of aspiration.  SLP following for dysphagia management.  Order also acknowledged for a cognitive-linguistic evaluation.  However, cog eval is not appropriate at this time.      Recommendations  Diet Consistency: Soft and Bite-Size/Thins  Instrumentation: Instrumental swallow study pending clinical progress  Medication: Crush with applesauce, as appropriate, Crush with pudding/puree, as appropriate, Whole with puree  Supervision: 1:1 feeding with constant supervision, Assist with meal tray set up, Direct supervision during meals  Positioning: Fully upright and midline during oral intake  Risk Management : Small bites/sips, Alternate bites and sips, Slow rate of intake  Oral Care: BID         SLP Treatment Plan  Treatment Plan: Dysphagia Treatment, Patient/Family/Caregiver Training  SLP Frequency: 3x Per Week  Estimated Duration: Until  "Therapy Goals Met      Anticipated Discharge Needs  Discharge Recommendations: Recommend post-acute placement for additional speech therapy services prior to discharge home   Therapy Recommendations Upon DC: Dysphagia Training, Patient / Family / Caregiver Education        Patient / Family Goals  Patient / Family Goal #1: \"I like the fruit.\"  Short Term Goals  Short Term Goal # 1: The patient will consume SB6/TN0 diet with no overt s/sx of aspiration or pulmonary      Kerritobin Mar, SLP   "

## 2024-11-07 NOTE — CARE PLAN
The patient is Unstable - High likelihood or risk of patient condition declining or worsening    Shift Goals  Clinical Goals: CIWA, [place] checks, Safety  Patient Goals: Rest  Family Goals: EDUARDO    Progress made toward(s) clinical / shift goals:          Problem: Optimal Care for Alcohol Withdrawal  Goal: Optimal Care for the alcohol withdrawal patient  Description: Target End Date:  1 to 3 days    1.  Alcohol history screening done on admission  2.  CIWA-AR score assessment (includes assessment of nausea/vomiting, tremor, sweats, anxiety, agitation, tactile, visual and auditory disturbances, headache and orientation/sensorium).  Document on CIWA flowsheet.  3.  Follow CIWA-AR score protocol  4.  Frequent reorientation  5.  Monitor for fluid and electrolyte imbalance.  6.  Assess for respiratory depression due to sedation (pulse ox)  7.  Consider thiamine, multivitamins, folic acid and magnesium sulfate per provider order  8.  Collaborate with Social Workers/Case Management  9.  Collaborate with mental health  Outcome: Progressing  Note: Patient actively participates in CIWA scoring. He's medication compliant.     Problem: Neuro Status  Goal: Neuro status will remain stable or improve  Description: Target End Date:  Prior to discharge or change in level of care    Document on Neuro assessment in the Assessment flowsheet    1.  Assess and monitor neurologic status per provider order/protocol/unit policy  2.  Assess level of consciousness and orientation  3.  Assess for speech, dysarthria, dysphagia, facial symmetry  4.  Assess visual field, eye movements, gaze preference, pupil reaction and size  5.  Assess muscle strength and motor response in all four extremities  6.  Assess for sensation (numbness and tingling)  7.  Assess basic neuro reflexes (cough, gag, corneal)  8.  Identify changes in neuro status and report to provider for testing/treatment orders  Outcome: Not Progressing  Note: Patient participates in neuro  checks.        Patient is not progressing towards the following goals:      Problem: Knowledge Deficit - Standard  Goal: Patient and family/care givers will demonstrate understanding of plan of care, disease process/condition, diagnostic tests and medications  Description: Target End Date:  1-3 days or as soon as patient condition allows    Document in Patient Education    1.  Patient and family/caregiver oriented to unit, equipment, visitation policy and means for communicating concern  2.  Complete/review Learning Assessment  3.  Assess knowledge level of disease process/condition, treatment plan, diagnostic tests and medications  4.  Explain disease process/condition, treatment plan, diagnostic tests and medications  Outcome: Not Progressing     Problem: Neuro Status  Goal: Neuro status will remain stable or improve  Description: Target End Date:  Prior to discharge or change in level of care    Document on Neuro assessment in the Assessment flowsheet    1.  Assess and monitor neurologic status per provider order/protocol/unit policy  2.  Assess level of consciousness and orientation  3.  Assess for speech, dysarthria, dysphagia, facial symmetry  4.  Assess visual field, eye movements, gaze preference, pupil reaction and size  5.  Assess muscle strength and motor response in all four extremities  6.  Assess for sensation (numbness and tingling)  7.  Assess basic neuro reflexes (cough, gag, corneal)  8.  Identify changes in neuro status and report to provider for testing/treatment orders  Outcome: Not Progressing  Note: Patient participates in neuro checks.

## 2024-11-07 NOTE — DIETARY
"Nutrition Services: Initial Assessment     Day 3 of admit. Raj Sharpe is a 44 y.o. male with admitting DX of Altered mental status [R41.82]     Consult received for MST score >/= 2 for reported unintentional weight loss of 14-23 lb x 1 month, decreased appetite, and wound.     Nutrition Assessment:      Height: 175.3 cm (5' 9.02\")  Weight: 60 kg (132 lb 4.4 oz)  Weight taken via standing scale  Body mass index is 19.52 kg/m². BMI classification: Normal.  Wt Readings from Last 6 Encounters:   11/07/24 60 kg (132 lb 4.4 oz)   08/09/24 60.3 kg (132 lb 15 oz)   01/17/24 66.8 kg (147 lb 3.2 oz)   11/22/23 69.4 kg (153 lb)   11/08/23 71.1 kg (156 lb 12 oz)   12/09/22 69.4 kg (153 lb)      Per chart review, pt with a 24 lb (15%) weight loss in the last year, which is notable but not clinically significant     Objective:  Pt BIBA after being found down, ALOC.   Pertinent medical hx:   Past Medical History:   Diagnosis Date    ETOH abuse     No pertinent past medical history    Pertinent labs: BUN 4, creatinine 0.49  Pertinent meds: folic acid, miralax, thiamine, MVI  Skin/wounds: deep tissue PI to bilateral shoulders and back. No edema.  Food Allergies: NKFA  Last BM: PTA per flowsheets     Current diet order:   IDDSI level 6 - soft/bite-sized  PO intake this admit per ADLs has been <25% x 2 meals, % x 3 meals.    Subjective:   Patient with no significant weight loss in the last year, stable weight for at least the past 3 months, no edema, and is demonstrating adequate PO intake. Some mild muscle wasting evident in clavicles and scapulae, not clinically significant as adequate muscle mass visible in acromion processes, temples. Adequate muscle mass evident in orbitals, upper arm mass.     Nutrition Diagnosis:      Based on RD assessment at this time, pt does not meet criteria in congruence with ASPEN/Academy guidelines for malnutrition.     Nutrition Interventions:      Encourage ongoing intake of >50% " of meals  + Ensure max protein BID to contribute to wound healing  Patient aware of active plan of care as appropriate.     Nutrition Monitoring and Evaluation:     Monitor nutrition POC  Additional fluids per MD/DO  Monitor vital signs pertinent to nutrition       RD following and will provide updated recommendations as indicated.

## 2024-11-07 NOTE — CARE PLAN
The patient is Watcher - Medium risk of patient condition declining or worsening    Shift Goals  Clinical Goals: neuro checks, CIWA, monitor labs  Patient Goals: rest  Family Goals: updates    Progress made toward(s) clinical / shift goals:    Problem: Optimal Care for Alcohol Withdrawal  Goal: Optimal Care for the alcohol withdrawal patient  Description: Target End Date:  1 to 3 days    1.  Alcohol history screening done on admission  2.  CIWA-AR score assessment (includes assessment of nausea/vomiting, tremor, sweats, anxiety, agitation, tactile, visual and auditory disturbances, headache and orientation/sensorium).  Document on CIWA flowsheet.  3.  Follow CIWA-AR score protocol  4.  Frequent reorientation  5.  Monitor for fluid and electrolyte imbalance.  6.  Assess for respiratory depression due to sedation (pulse ox)  7.  Consider thiamine, multivitamins, folic acid and magnesium sulfate per provider order  8.  Collaborate with Social Workers/Case Management  9.  Collaborate with mental health  Outcome: Progressing  Note: CIWA scoring continued.      Problem: Neuro Status  Goal: Neuro status will remain stable or improve  Description: Target End Date:  Prior to discharge or change in level of care    Document on Neuro assessment in the Assessment flowsheet    1.  Assess and monitor neurologic status per provider order/protocol/unit policy  2.  Assess level of consciousness and orientation  3.  Assess for speech, dysarthria, dysphagia, facial symmetry  4.  Assess visual field, eye movements, gaze preference, pupil reaction and size  5.  Assess muscle strength and motor response in all four extremities  6.  Assess for sensation (numbness and tingling)  7.  Assess basic neuro reflexes (cough, gag, corneal)  8.  Identify changes in neuro status and report to provider for testing/treatment orders  Outcome: Progressing  Note: Q4 neuro checks performed.        Patient is not progressing towards the following  goals:      Problem: Knowledge Deficit - Standard  Goal: Patient and family/care givers will demonstrate understanding of plan of care, disease process/condition, diagnostic tests and medications  Description: Target End Date:  1-3 days or as soon as patient condition allows    Document in Patient Education    1.  Patient and family/caregiver oriented to unit, equipment, visitation policy and means for communicating concern  2.  Complete/review Learning Assessment  3.  Assess knowledge level of disease process/condition, treatment plan, diagnostic tests and medications  4.  Explain disease process/condition, treatment plan, diagnostic tests and medications  Outcome: Not Progressing  Note: Patient shows no evidence of learning.     Problem: Fall Risk  Goal: Patient will remain free from falls  Description: Target End Date:  Prior to discharge or change in level of care    Document interventions on the [facility] Fall Risk Assessment    1.  Assess for fall risk factors  2.  Implement fall precautions  Outcome: Not Progressing  Note: Patient had a fall this shift. All fall precautions were in place. Appropriate post-fall measures taken.

## 2024-11-07 NOTE — PROGRESS NOTES
[facility] Internal Medicine Daily Progress Note    Date of Service  11/7/2024    [facility] Team: R IM Blue Team   Attending: NICOLE Chilel M.d.  Senior Resident: Risa Julien MD  Intern: Robert Scales MD  Contact Number: [phone]    Chief Complaint  Raj Sharpe is a 44 y.o. male admitted 11/3/2024 with  Chief Complaint   Patient presents with    ALOC     BIBA pt was found down on the floor in his bathroom today by family checking on him. He lives alone, LKW this Thursday. Pt altered, GCS 11, some bruising to torso, no other obvious signs of trauma. NSR, 158BGL PTA. Pt has a hx of EtOH abuse.          Hospital Course  44-year-old male with a history of significant alcohol abuse, presented on 11/3/2024 with altered mental status. His girlfriend, who provided the history, found him minimally responsive in the bathroom after returning from out of town. He was last seen normal the previous Thursday. The patient had been experiencing unsteady gait and double vision two weeks prior and was consuming approximately 1.5 liters of vodka daily. He has a history of multiple admissions requiring aggressive CIWA protocol measures. CT head and chest X-ray showed no acute abnormalities.    The patient was initially admitted to the Intermediate Care Unit (IMCU) for management of encephalopathy with severe hypokalemia. Patient has been transferred to medicine floor on 11/05/2024.    Interval Problem Update  The patient's folic acid dosage is being increased from 1 mg to 5 mg daily due to low folic acid levels as seen on labs today. The plan is to recheck the levels in one week.    The patient reports no current complaints and remains oriented only to their name. They incorrectly state their location as [place] and estimate their age to be in their 20s. The patient denies experiencing any pain in their legs or knees on both sides. Bilateral nystagmus is still present.     The pharmacy team recommends an initial  thiamine dose of 500 mg three times daily for the first few days for Korsakoff syndrome. This can be followed by 100 mg three times daily for an additional 1-2 weeks if clinically appropriate, before transitioning to 100 mg daily. The plan is to follow the pharmacy team's recommendation.      I have discussed this patient's plan of care and discharge plan at IDT rounds today with Case Management, Nursing, Nursing leadership, and other members of the IDT team.    Consultants/Specialty  None    Code Status  Full Code    Disposition  The patient is not medically cleared for discharge to home or a post-acute facility.      I have placed the appropriate orders for post-discharge needs.    Review of Systems  Review of Systems   Constitutional:  Negative for chills and fever.   HENT:  Negative for ear pain and sore throat.    Eyes:  Negative for blurred vision and pain.   Respiratory:  Negative for cough and shortness of breath.    Cardiovascular:  Negative for chest pain, palpitations and leg swelling.   Gastrointestinal:  Negative for abdominal pain, constipation, diarrhea, nausea and vomiting.   Genitourinary:  Negative for dysuria, frequency, hematuria and urgency.   Musculoskeletal:  Negative for falls and joint pain.   Skin:  Negative for rash.   Neurological:  Negative for seizures, loss of consciousness, weakness and headaches.   Endo/Heme/Allergies:  Negative for environmental allergies. Does not bruise/bleed easily.   Psychiatric/Behavioral:  Negative for depression, hallucinations and suicidal ideas.         Physical Exam  Temp:  [36.2 °C (97.2 °F)-36.4 °C (97.5 °F)] 36.2 °C (97.2 °F)  Pulse:  [65-86] 86  Resp:  [16-18] 18  BP: (103-138)/(70-98) 123/80  SpO2:  [97 %-100 %] 100 %    Physical Exam  Constitutional:       General: He is not in acute distress.     Appearance: Normal appearance.   HENT:      Head: Normocephalic and atraumatic.      Right Ear: Ear canal and external ear normal.      Left Ear: Ear canal  and external ear normal.      Nose: Nose normal. No congestion or rhinorrhea.      Mouth/Throat:      Mouth: Mucous membranes are moist.      Pharynx: Oropharynx is clear. No oropharyngeal exudate or posterior oropharyngeal erythema.   Eyes:      General: No scleral icterus.     Extraocular Movements: Extraocular movements intact.      Right eye: Nystagmus present.      Left eye: Nystagmus present.      Conjunctiva/sclera:      Right eye: Right conjunctiva is not injected.      Left eye: Left conjunctiva is not injected.      Pupils: Pupils are equal, round, and reactive to light.   Cardiovascular:      Rate and Rhythm: Normal rate and regular rhythm.      Pulses: Normal pulses.      Heart sounds: No murmur heard.  Pulmonary:      Effort: Pulmonary effort is normal. No respiratory distress.      Breath sounds: Normal breath sounds.   Abdominal:      General: Abdomen is flat. Bowel sounds are normal. There is no distension.      Palpations: Abdomen is soft.      Tenderness: There is no abdominal tenderness.   Musculoskeletal:      Cervical back: Normal range of motion. No rigidity or tenderness.      Right lower leg: No edema.      Left lower leg: No edema.   Skin:     Coloration: Skin is not jaundiced.      Findings: No bruising.   Neurological:      General: No focal deficit present.      Mental Status: He is alert. He is disoriented.      Cranial Nerves: No cranial nerve deficit.      Motor: Tremor present.   Psychiatric:         Mood and Affect: Mood normal.         Behavior: Behavior normal.         Thought Content: Thought content normal.         Fluids    Intake/Output Summary (Last 24 hours) at 11/7/2024 1728  Last data filed at 11/7/2024 1200  Gross per 24 hour   Intake 460 ml   Output 2400 ml   Net -1940 ml       Laboratory  Recent Labs     11/05/24  0001 11/06/24  0454 11/07/24  0112   WBC 14.9* 8.8 6.8   RBC 2.84* 2.58* 2.67*   HEMOGLOBIN 9.9* 8.9* 9.1*   HEMATOCRIT 28.2* 26.6* 27.4*   MCV 99.3* 103.1*  102.6*   MCH 34.9* 34.5* 34.1*   MCHC 35.1 33.5 33.2   RDW 51.8* 55.5* 54.8*   PLATELETCT 151* 142* 147*   MPV 11.3 10.9 11.1     Recent Labs     11/05/24  1156 11/06/24  0454 11/07/24  0112   SODIUM 137 136 136   POTASSIUM 4.2 4.7 4.2   CHLORIDE 104 107 107   CO2 21 20 21   GLUCOSE 91 86 94   BUN 8 5* 4*   CREATININE 0.47* 0.50 0.49*   CALCIUM 8.7 9.0 8.6                   Imaging  IR-US GUIDED PIV   Final Result    Ultrasound-guided PERIPHERAL IV INSERTION performed by    qualified nursing staff as above.      US-RUQ   Final Result      1.  Hepatomegaly with hepatic steatosis      2.  Mildly thickened gallbladder wall which may be secondary to hypoproteinemia, hypoalbuminemia, or inflammation.      3.  Sludge-filled gallbladder.      CT-HEAD W/O   Final Result      1.  No evidence of acute intracranial process.      2.  Atrophy.               DX-CHEST-PORTABLE (1 VIEW)   Final Result      No evidence of acute cardiopulmonary process.           Assessment/Plan  Problem Representation:    * Altered mental status- (present on admission)  Assessment & Plan  Assessment: The patient presents with altered mental status, disorientation, and a history of alcohol abuse. No acute intracranial process was found on CT. Last known well was this past Thursday.  Based off signs and symptoms concern for underlying Warnicke's encephalopathy given extensive alcohol use (1.5 L of liquor daily).  Differential also includes hepatic encephalopathy in the setting of possible underlying cirrhosis.    Plan:   -Neurochecks every 4 hours   -Continue high-dose IV thiamine IV per pharmacy's recommendation for Korsakoff  -Continue monitor mentation. Reassess mental status regularly.   -Ensure safety measures to prevent falls or injuries.    Hepatic steatosis  Assessment & Plan  Assessment: Ultrasound shows hepatic steatosis and hepatomegaly, likely related to chronic alcohol use.  Plan: Monitor liver function tests. Encourage alcohol cessation.  Consider hepatology consultation if liver function worsens.    Elevated creatine kinase level  Assessment & Plan  Assessment: CK elevated at 492 U/L, possibly due to alcohol myopathy or prolonged immobility.  Plan: Ensure adequate hydration. Monitor CK levels and renal function. Encourage mobilization as tolerated.    Thrombocytopenia (HCC)  Assessment & Plan  Assessment: Reduced platelet count (151 K/uL) on 11/05/2024, potentially related to alcohol use or liver dysfunction.  Plan: Monitor platelet levels. Assess for bleeding risks.    Anemia  Assessment & Plan  Assessment: Low hemoglobin (9.9 g/dL), likely due to nutritional deficiencies. Labs on 11/7/2024 shows low Folic Acid.  Plan:   -Supplement as necessary. Increased dose of Folic Acid on 11/07/2024.  -Monitor complete blood count (CBC) and reticulocyte count.    Tobacco use  Assessment & Plan  Patient is a 30-pack-year history, still smoking 1 pack/day.  -Will need extensive counseling when clinically appropriate    Alcohol use  Assessment & Plan  Assessment: The patient has a significant history of alcohol abuse, consuming approximately 1.5 liters of vodka daily, with multiple prior admissions for withdrawal management.  Plan: Initiate [facility] protocol for withdrawal management. Administer thiamine and multivitamins. Provide counseling and discuss long-term treatment options for alcohol dependence, including referral to a rehabilitation program.    Prolonged QT interval  Assessment & Plan  Prolonged QTc of 543 noted on EKG, likely sequela of hyperkalemia.  -Telemetry monitoring  -Continue to optimize electrolytes (i.e. K and mag)    Transaminitis  Assessment & Plan  Mild transaminitis with AST of 124, ALT of 58 on presentation.  Alk phos also elevated at 318.  Likely alcoholic hepatitis in the setting of significant alcohol use.  Uncertain however likely that patient might have some form of liver disease secondary chronic alcohol abuse.  -Continue  monitor/trend  -Obtaining RUQ ultrasound for further evaluation    Lactic acidosis- (present on admission)  Assessment & Plan  Assessment: Lactic acid initially elevated, now trending down, indicating possible metabolic stress.  Plan: Continue monitoring lactic acid levels. Ensure adequate oxygenation and perfusion. Adjust fluid management as needed.    Leukocytosis- (present on admission)  Assessment & Plan  Leukocytosis of 16.8 followed by 14.7 noted on presentation, neutrophilic predominant.  No signs of active infection at this time, possible stress demargination in the setting of acute illness.  Received IV ceftriaxone in ED.  -Continue monitor/trend  -Low threshold to initiate antibiotics if concern for infection    Hypokalemia- (present on admission)  Assessment & Plan  RESOLVED. Patient had initial hypokalemia (now corrected to 4.2 mmol/L).         VTE prophylaxis: enoxaparin ppx and SCDs    I have performed a physical exam and reviewed and updated ROS and Plan today (11/7/2024). In review of yesterday's note (11/6/2024), there are no changes except as documented above.

## 2024-11-07 NOTE — DISCHARGE PLANNING
Spoke with Sarah patients SO, she and patients sister have been working to get patient on COBRA insurance, they are coming to the hospital this afternoon with COBRA paperwork.    1530  SO and mother here, plan is to contact billing to give COBRA information.

## 2024-11-08 LAB
ALBUMIN SERPL BCP-MCNC: 3 G/DL (ref 3.2–4.9)
ALBUMIN/GLOB SERPL: 1.1 G/DL
ALP SERPL-CCNC: 288 U/L (ref 30–99)
ALT SERPL-CCNC: 93 U/L (ref 2–50)
ANION GAP SERPL CALC-SCNC: 14 MMOL/L (ref 7–16)
AST SERPL-CCNC: 149 U/L (ref 12–45)
BACTERIA BLD CULT: NORMAL
BILIRUB SERPL-MCNC: 0.5 MG/DL (ref 0.1–1.5)
BUN SERPL-MCNC: 6 MG/DL (ref 8–22)
CALCIUM ALBUM COR SERPL-MCNC: 10.4 MG/DL (ref 8.5–10.5)
CALCIUM SERPL-MCNC: 9.6 MG/DL (ref 8.5–10.5)
CHLORIDE SERPL-SCNC: 104 MMOL/L (ref 96–112)
CO2 SERPL-SCNC: 20 MMOL/L (ref 20–33)
CREAT SERPL-MCNC: 0.68 MG/DL (ref 0.5–1.4)
ERYTHROCYTE [DISTWIDTH] IN BLOOD BY AUTOMATED COUNT: 55.5 FL (ref 35.9–50)
GAMMA INTERFERON BACKGROUND BLD IA-ACNC: 0.08 IU/ML
GFR SERPLBLD CREATININE-BSD FMLA CKD-EPI: 118 ML/MIN/1.73 M 2
GLOBULIN SER CALC-MCNC: 2.7 G/DL (ref 1.9–3.5)
GLUCOSE SERPL-MCNC: 105 MG/DL (ref 65–99)
HCT VFR BLD AUTO: 27.9 % (ref 42–52)
HGB BLD-MCNC: 9.6 G/DL (ref 14–18)
M TB IFN-G BLD-IMP: ABNORMAL
M TB IFN-G CD4+ BCKGRND COR BLD-ACNC: 0 IU/ML
MAGNESIUM SERPL-MCNC: 1.7 MG/DL (ref 1.5–2.5)
MCH RBC QN AUTO: 34.8 PG (ref 27–33)
MCHC RBC AUTO-ENTMCNC: 34.4 G/DL (ref 32.3–36.5)
MCV RBC AUTO: 101.1 FL (ref 81.4–97.8)
MITOGEN IGNF BCKGRD COR BLD-ACNC: 0.21 IU/ML
PHOSPHATE SERPL-MCNC: 4.8 MG/DL (ref 2.5–4.5)
PLATELET # BLD AUTO: 161 K/UL (ref 164–446)
PMV BLD AUTO: 10.8 FL (ref 9–12.9)
POTASSIUM SERPL-SCNC: 4.2 MMOL/L (ref 3.6–5.5)
PROT SERPL-MCNC: 5.7 G/DL (ref 6–8.2)
QFT TB2 - NIL TBQ2: 0 IU/ML
RBC # BLD AUTO: 2.76 M/UL (ref 4.7–6.1)
SIGNIFICANT IND 70042: NORMAL
SITE SITE: NORMAL
SODIUM SERPL-SCNC: 138 MMOL/L (ref 135–145)
SOURCE SOURCE: NORMAL
WBC # BLD AUTO: 7 K/UL (ref 4.8–10.8)

## 2024-11-08 PROCEDURE — 83735 ASSAY OF MAGNESIUM: CPT

## 2024-11-08 PROCEDURE — A9270 NON-COVERED ITEM OR SERVICE: HCPCS

## 2024-11-08 PROCEDURE — 85027 COMPLETE CBC AUTOMATED: CPT

## 2024-11-08 PROCEDURE — 80053 COMPREHEN METABOLIC PANEL: CPT

## 2024-11-08 PROCEDURE — 700102 HCHG RX REV CODE 250 W/ 637 OVERRIDE(OP)

## 2024-11-08 PROCEDURE — A9270 NON-COVERED ITEM OR SERVICE: HCPCS | Performed by: HOSPITALIST

## 2024-11-08 PROCEDURE — 770020 HCHG ROOM/CARE - TELE (206)

## 2024-11-08 PROCEDURE — 700111 HCHG RX REV CODE 636 W/ 250 OVERRIDE (IP)

## 2024-11-08 PROCEDURE — A9270 NON-COVERED ITEM OR SERVICE: HCPCS | Performed by: STUDENT IN AN ORGANIZED HEALTH CARE EDUCATION/TRAINING PROGRAM

## 2024-11-08 PROCEDURE — 700102 HCHG RX REV CODE 250 W/ 637 OVERRIDE(OP): Performed by: HOSPITALIST

## 2024-11-08 PROCEDURE — 99232 SBSQ HOSP IP/OBS MODERATE 35: CPT | Mod: GC | Performed by: INTERNAL MEDICINE

## 2024-11-08 PROCEDURE — 36415 COLL VENOUS BLD VENIPUNCTURE: CPT

## 2024-11-08 PROCEDURE — 84100 ASSAY OF PHOSPHORUS: CPT

## 2024-11-08 PROCEDURE — 700102 HCHG RX REV CODE 250 W/ 637 OVERRIDE(OP): Performed by: STUDENT IN AN ORGANIZED HEALTH CARE EDUCATION/TRAINING PROGRAM

## 2024-11-08 RX ORDER — THIAMINE HYDROCHLORIDE 100 MG/ML
200 INJECTION, SOLUTION INTRAMUSCULAR; INTRAVENOUS 3 TIMES DAILY
Status: COMPLETED | OUTPATIENT
Start: 2024-11-08 | End: 2024-11-10

## 2024-11-08 RX ORDER — GAUZE BANDAGE 2" X 2"
100 BANDAGE TOPICAL 3 TIMES DAILY
Status: DISCONTINUED | OUTPATIENT
Start: 2024-11-11 | End: 2024-11-11 | Stop reason: HOSPADM

## 2024-11-08 RX ORDER — THIAMINE HYDROCHLORIDE 100 MG/ML
100 INJECTION, SOLUTION INTRAMUSCULAR; INTRAVENOUS ONCE
Status: COMPLETED | OUTPATIENT
Start: 2024-11-08 | End: 2024-11-08

## 2024-11-08 RX ADMIN — THIAMINE HYDROCHLORIDE 100 MG: 100 INJECTION, SOLUTION INTRAMUSCULAR; INTRAVENOUS at 12:18

## 2024-11-08 RX ADMIN — THIAMINE HYDROCHLORIDE 100 MG: 100 INJECTION, SOLUTION INTRAMUSCULAR; INTRAVENOUS at 09:56

## 2024-11-08 RX ADMIN — FOLIC ACID 5 MG: 1 TABLET ORAL at 05:28

## 2024-11-08 RX ADMIN — THIAMINE HYDROCHLORIDE 200 MG: 100 INJECTION, SOLUTION INTRAMUSCULAR; INTRAVENOUS at 16:45

## 2024-11-08 RX ADMIN — POLYETHYLENE GLYCOL 3350 1 PACKET: 17 POWDER, FOR SOLUTION ORAL at 05:28

## 2024-11-08 RX ADMIN — NICOTINE TRANSDERMAL SYSTEM 21 MG: 21 PATCH, EXTENDED RELEASE TRANSDERMAL at 05:28

## 2024-11-08 RX ADMIN — OMEPRAZOLE 20 MG: 20 CAPSULE, DELAYED RELEASE ORAL at 05:28

## 2024-11-08 RX ADMIN — THERA TABS 1 TABLET: TAB at 05:28

## 2024-11-08 RX ADMIN — THIAMINE HYDROCHLORIDE 200 MG: 100 INJECTION, SOLUTION INTRAMUSCULAR; INTRAVENOUS at 20:07

## 2024-11-08 ASSESSMENT — LIFESTYLE VARIABLES
TREMOR: TREMOR NOT VISIBLE BUT CAN BE FELT, FINGERTIP TO FINGERTIP
TOTAL SCORE: 4
PAROXYSMAL SWEATS: NO SWEAT VISIBLE
AGITATION: NORMAL ACTIVITY
VISUAL DISTURBANCES: NOT PRESENT
AGITATION: NORMAL ACTIVITY
ANXIETY: NO ANXIETY (AT EASE)
AUDITORY DISTURBANCES: NOT PRESENT
AUDITORY DISTURBANCES: NOT PRESENT
PAROXYSMAL SWEATS: NO SWEAT VISIBLE
AUDITORY DISTURBANCES: NOT PRESENT
ANXIETY: NO ANXIETY (AT EASE)
AUDITORY DISTURBANCES: NOT PRESENT
NAUSEA AND VOMITING: NO NAUSEA AND NO VOMITING
ORIENTATION AND CLOUDING OF SENSORIUM: CANNOT DO SERIAL ADDITIONS OR IS UNCERTAIN ABOUT DATE
TOTAL SCORE: 1
TREMOR: NO TREMOR
VISUAL DISTURBANCES: NOT PRESENT
NAUSEA AND VOMITING: NO NAUSEA AND NO VOMITING
HEADACHE, FULLNESS IN HEAD: NOT PRESENT
TOTAL SCORE: 1
VISUAL DISTURBANCES: NOT PRESENT
ORIENTATION AND CLOUDING OF SENSORIUM: DISORIENTED FOR PLACE AND / OR PERSON
TOTAL SCORE: 4
TREMOR: NO TREMOR
ORIENTATION AND CLOUDING OF SENSORIUM: DISORIENTED FOR PLACE AND / OR PERSON
VISUAL DISTURBANCES: NOT PRESENT
VISUAL DISTURBANCES: NOT PRESENT
AGITATION: NORMAL ACTIVITY
AUDITORY DISTURBANCES: NOT PRESENT
HEADACHE, FULLNESS IN HEAD: NOT PRESENT
AUDITORY DISTURBANCES: NOT PRESENT
HEADACHE, FULLNESS IN HEAD: NOT PRESENT
TREMOR: TREMOR NOT VISIBLE BUT CAN BE FELT, FINGERTIP TO FINGERTIP
HEADACHE, FULLNESS IN HEAD: NOT PRESENT
AGITATION: NORMAL ACTIVITY
NAUSEA AND VOMITING: NO NAUSEA AND NO VOMITING
ORIENTATION AND CLOUDING OF SENSORIUM: CANNOT DO SERIAL ADDITIONS OR IS UNCERTAIN ABOUT DATE
TOTAL SCORE: 4
ANXIETY: NO ANXIETY (AT EASE)
AGITATION: NORMAL ACTIVITY
ANXIETY: NO ANXIETY (AT EASE)
ANXIETY: NO ANXIETY (AT EASE)
NAUSEA AND VOMITING: NO NAUSEA AND NO VOMITING
ANXIETY: *
HEADACHE, FULLNESS IN HEAD: NOT PRESENT
ORIENTATION AND CLOUDING OF SENSORIUM: CANNOT DO SERIAL ADDITIONS OR IS UNCERTAIN ABOUT DATE
VISUAL DISTURBANCES: NOT PRESENT
ORIENTATION AND CLOUDING OF SENSORIUM: CANNOT DO SERIAL ADDITIONS OR IS UNCERTAIN ABOUT DATE
TREMOR: NO TREMOR
NAUSEA AND VOMITING: NO NAUSEA AND NO VOMITING
PAROXYSMAL SWEATS: NO SWEAT VISIBLE
TREMOR: NO TREMOR
NAUSEA AND VOMITING: NO NAUSEA AND NO VOMITING
AGITATION: NORMAL ACTIVITY
PAROXYSMAL SWEATS: NO SWEAT VISIBLE
TOTAL SCORE: 2
PAROXYSMAL SWEATS: NO SWEAT VISIBLE
PAROXYSMAL SWEATS: NO SWEAT VISIBLE
HEADACHE, FULLNESS IN HEAD: NOT PRESENT

## 2024-11-08 ASSESSMENT — ENCOUNTER SYMPTOMS
VOMITING: 0
FALLS: 0
HALLUCINATIONS: 0
BRUISES/BLEEDS EASILY: 0
BLURRED VISION: 0
DIARRHEA: 0
ABDOMINAL PAIN: 0
FEVER: 0
LOSS OF CONSCIOUSNESS: 0
SORE THROAT: 0
CONSTIPATION: 0
SEIZURES: 0
DEPRESSION: 0
WEAKNESS: 0
CHILLS: 0
PALPITATIONS: 0
COUGH: 0
SHORTNESS OF BREATH: 0
HEADACHES: 0
EYE PAIN: 0
NAUSEA: 0

## 2024-11-08 ASSESSMENT — COGNITIVE AND FUNCTIONAL STATUS - GENERAL
TOILETING: A LOT
DAILY ACTIVITIY SCORE: 13
DRESSING REGULAR LOWER BODY CLOTHING: A LOT
SUGGESTED CMS G CODE MODIFIER DAILY ACTIVITY: CL
HELP NEEDED FOR BATHING: A LOT
PERSONAL GROOMING: A LOT
EATING MEALS: A LITTLE
DRESSING REGULAR UPPER BODY CLOTHING: A LOT

## 2024-11-08 ASSESSMENT — FIBROSIS 4 INDEX: FIB4 SCORE: 4.13

## 2024-11-08 ASSESSMENT — PAIN DESCRIPTION - PAIN TYPE: TYPE: ACUTE PAIN

## 2024-11-08 NOTE — PROGRESS NOTES
"Assumed care of patient at bedside report from day shift RN. Updated on plan of care.   Patient currently A & O x 1 to self; on room air. Able to turn self in bed; without complaints of acute pain, medicated per MAR. Assessment completed. Patient's last bowel movement was prior to arrival, patient unable to articulate when his last BM was .   Call light within reach. Hourly rounding and fall precautions in place. Bed locked and in lowest position. All questions answered. No other needs indicated at this time.    Most recent vital signs  /84   Pulse 80   Temp 36.5 °C (97.7 °F) (Temporal)   Resp 18   Ht 1.753 m (5' 9.02\")   Wt 60 kg (132 lb 4.4 oz)   SpO2 100%   BMI 19.52 kg/m²     "

## 2024-11-08 NOTE — CARE PLAN
The patient is Watcher - Medium risk of patient condition declining or worsening    Shift Goals  Clinical Goals: CIWA, neuro checks, safety  Patient Goals: rest  Family Goals: updates    Progress made toward(s) clinical / shift goals:    Problem: Optimal Care for Alcohol Withdrawal  Goal: Optimal Care for the alcohol withdrawal patient  Description: Target End Date:  1 to 3 days    1.  Alcohol history screening done on admission  2.  CIWA-AR score assessment (includes assessment of nausea/vomiting, tremor, sweats, anxiety, agitation, tactile, visual and auditory disturbances, headache and orientation/sensorium).  Document on CIWA flowsheet.  3.  Follow CIWA-AR score protocol  4.  Frequent reorientation  5.  Monitor for fluid and electrolyte imbalance.  6.  Assess for respiratory depression due to sedation (pulse ox)  7.  Consider thiamine, multivitamins, folic acid and magnesium sulfate per provider order  8.  Collaborate with Social Workers/Case Management  9.  Collaborate with mental health  Outcome: Progressing  Note: CIWA scoring continued.      Problem: Neuro Status  Goal: Neuro status will remain stable or improve  Description: Target End Date:  Prior to discharge or change in level of care    Document on Neuro assessment in the Assessment flowsheet    1.  Assess and monitor neurologic status per provider order/protocol/unit policy  2.  Assess level of consciousness and orientation  3.  Assess for speech, dysarthria, dysphagia, facial symmetry  4.  Assess visual field, eye movements, gaze preference, pupil reaction and size  5.  Assess muscle strength and motor response in all four extremities  6.  Assess for sensation (numbness and tingling)  7.  Assess basic neuro reflexes (cough, gag, corneal)  8.  Identify changes in neuro status and report to provider for testing/treatment orders  Outcome: Progressing  Note: Q4 neuro checks done on patient.        Patient is not progressing towards the following  goals:      Problem: Knowledge Deficit - Standard  Goal: Patient and family/care givers will demonstrate understanding of plan of care, disease process/condition, diagnostic tests and medications  Description: Target End Date:  1-3 days or as soon as patient condition allows    Document in Patient Education    1.  Patient and family/caregiver oriented to unit, equipment, visitation policy and means for communicating concern  2.  Complete/review Learning Assessment  3.  Assess knowledge level of disease process/condition, treatment plan, diagnostic tests and medications  4.  Explain disease process/condition, treatment plan, diagnostic tests and medications  Outcome: Not Progressing  Note: Patient shows no evidence of learning. Patient girlfriend updated on plan of care at bedside.

## 2024-11-08 NOTE — CARE PLAN
The patient is Stable - Low risk of patient condition declining or worsening    Shift Goals  Clinical Goals: monitor CIWA, neuro, skin integrity  Patient Goals: comfort  Family Goals: alyson      Problem: Knowledge Deficit - Standard  Goal: Patient and family/care givers will demonstrate understanding of plan of care, disease process/condition, diagnostic tests and medications  Outcome: Progressing  Note: Patient will demonstrate understanding of assessment questions during CIWA scoring.     Problem: Optimal Care for Alcohol Withdrawal  Goal: Optimal Care for the alcohol withdrawal patient  Outcome: Progressing  Note: CIWA will be monitored and scored appropriately.

## 2024-11-08 NOTE — THERAPY
"Occupational Therapy  Daily Treatment     Patient Name: Raj Sharpe  Age:  44 y.o., Sex:  male  Medical Record #: [id number]  Today's Date: 11/8/2024     Precautions  Precautions: (P) Fall Risk, Swallow Precautions  Comments: Buckling of BLE    Assessment    Pt seen for OT tx session, pt cooperative throughout session, however was difficult to redirect. Pt unable to hold seated balance to participate in seated ADLs. Pt incont of stool, total A for hygiene. Pt demo'd impaired cognition, balance, activity tolerance and functional mobility. Will continue to follow.     Plan    Treatment Plan Status: (P) Continue Current Treatment Plan  Type of Treatment: Self Care / Activities of Daily Living, Adaptive Equipment, Neuro Re-Education / Balance, Therapeutic Exercises, Therapeutic Activity  Treatment Frequency: 3 Times per Week  Treatment Duration: Until Therapy Goals Met    DC Equipment Recommendations: (P) Unable to determine at this time  Discharge Recommendations: (P) Recommend post-acute placement for additional occupational therapy services prior to discharge home    Subjective    \"I need to find my wallet\"     Objective      Precautions   Precautions Fall Risk;Swallow Precautions   Vitals   O2 Delivery Device None - Room Air   Pain 0 - 10 Group   Therapist Pain Assessment Post Activity Pain Same as Prior to Activity;Nurse Notified  (no c/o pain during session)   Cognition    Cognition / Consciousness X   Speech/ Communication Delayed Responses   Orientation Level Not Oriented to Reason;Not Oriented to Place;Not Oriented to Age   Level of Consciousness Alert   Ability To Follow Commands 1 Step   Safety Awareness Impaired;Impulsive   New Learning Impaired   Attention Impaired   Sequencing Impaired   Initiation Impaired   Comments Pt thought he was in [place] and that he had pants on, kept reaching back for his wallet despite multiple cues that he was soiled   Balance   Sitting Balance (Static) Poor "   Sitting Balance (Dynamic) Poor -   Weight Shift Sitting Poor   Skilled Intervention Tactile Cuing;Verbal Cuing;Postural Facilitation;Compensatory Strategies   Comments w/ B UE support, able to hold balance when holding onto handles of chair   Bed Mobility    Supine to Sit Moderate Assist   Sit to Supine Moderate Assist   Scooting Minimal Assist  (scooting up in bed)   Activities of Daily Living   Grooming Moderate Assist;Seated  (unable to hold balance at EOB when performing)   Upper Body Dressing Moderate Assist   Toileting Total Assist  (incont of BM)   Skilled Intervention Verbal Cuing;Tactile Cuing;Facilitation   How much help from another person does the patient currently need...   Putting on and taking off regular lower body clothing? 2   Bathing (including washing, rinsing, and drying)? 2   Toileting, which includes using a toilet, bedpan, or urinal? 2   Putting on and taking off regular upper body clothing? 2   Taking care of personal grooming such as brushing teeth? 2   Eating meals? 3   6 Clicks Daily Activity Score 13   Functional Mobility   Sit to Stand Unable to Participate   Mobility sat EOB   Activity Tolerance   Sitting Edge of Bed 10 min   Patient / Family Goals   Patient / Family Goal #1 To go to work   Goal #1 Outcome Progressing as expected   Short Term Goals   Short Term Goal # 1 Pt will complete ADL txfs with supv   Goal Outcome # 1 Progressing as expected   Short Term Goal # 2 Pt will complete FB dressing with supv using AE PRN   Goal Outcome # 2 Progressing as expected   Short Term Goal # 3 Pt will complete toileting ADLs with min A   Goal Outcome # 3 Progressing as expected   Short Term Goal # 4 Pt will complete seated g/h routine with supv   Goal Outcome # 4 Progressing as expected   Education Group   Role of Occupational Therapist Patient Response Patient;Explanation;No Learning Evidence   Occupational Therapy Treatment Plan    O.T. Treatment Plan Continue Current Treatment Plan    Anticipated Discharge Equipment and Recommendations   DC Equipment Recommendations Unable to determine at this time   Discharge Recommendations Recommend post-acute placement for additional occupational therapy services prior to discharge home   Interdisciplinary Plan of Care Collaboration   IDT Collaboration with  Nursing;Certified Nursing Assistant   Patient Position at End of Therapy In Bed;Bed Alarm On;Call Light within Reach;Tray Table within Reach;Phone within Reach  (CNA in room with pt getting him ready for breakfast)   Collaboration Comments report given   Session Information   Date / Session Number  11/8, 2 (2/3, 11/12)

## 2024-11-08 NOTE — PROGRESS NOTES
Telemetry Monitor Report Summary    Rhythm SR  HR 75-97  Ectopy NA    Measurements 0.12/ 0.06 /0.34

## 2024-11-08 NOTE — PROGRESS NOTES
[facility] Internal Medicine Daily Progress Note    Date of Service  11/8/2024    R Team: R IM Blue Team   Attending: Sarah Gamino M.d.  Senior Resident: Risa Julien MD  Intern: Robert Scales MD  Contact Number: [phone]    Chief Complaint  Raj Sharpe is a 44 y.o. male admitted 11/3/2024 with:  Chief Complaint   Patient presents with    ALOC     BIBA pt was found down on the floor in his bathroom today by family checking on him. He lives alone, LKW this Thursday. Pt altered, GCS 11, some bruising to torso, no other obvious signs of trauma. NSR, 158BGL PTA. Pt has a hx of EtOH abuse.          Hospital Course  44-year-old male with a history of significant alcohol abuse, presented on 11/3/2024 with altered mental status. His girlfriend, who provided the history, found him minimally responsive in the bathroom after returning from out of town. He was last seen normal the previous Thursday. The patient had been experiencing unsteady gait and double vision two weeks prior and was consuming approximately 1.5 liters of vodka daily. He has a history of multiple admissions requiring aggressive CIWA protocol measures. CT head and chest X-ray showed no acute abnormalities.    The patient was initially admitted to the Intermediate Care Unit (IMCU) for management of encephalopathy with severe hypokalemia. Patient has been transferred to medicine floor on 11/05/2024.    Interval Problem Update  On examination, the patient remains oriented only to time and demonstrates confabulation. Additionally, the patient exhibits horizontal nystagmus.    The patient initially received IV thiamine 500 mg TID for about three days, then it was reduced to 200 mg TID.    I have discussed this patient's plan of care and discharge plan at IDT rounds today with Case Management, Nursing, Nursing leadership, and other members of the IDT team.    Consultants/Specialty  None    Code Status  Full Code    Disposition  The patient is not  medically cleared for discharge to home or a post-acute facility.      I have placed the appropriate orders for post-discharge needs.    Review of Systems  Review of Systems   Constitutional:  Negative for chills and fever.   HENT:  Negative for ear pain and sore throat.    Eyes:  Negative for blurred vision and pain.   Respiratory:  Negative for cough and shortness of breath.    Cardiovascular:  Negative for chest pain, palpitations and leg swelling.   Gastrointestinal:  Negative for abdominal pain, constipation, diarrhea, nausea and vomiting.   Genitourinary:  Negative for dysuria, frequency, hematuria and urgency.   Musculoskeletal:  Negative for falls and joint pain.   Skin:  Negative for rash.   Neurological:  Negative for seizures, loss of consciousness, weakness and headaches.   Endo/Heme/Allergies:  Negative for environmental allergies. Does not bruise/bleed easily.   Psychiatric/Behavioral:  Negative for depression, hallucinations and suicidal ideas.         Physical Exam  Temp:  [36 °C (96.8 °F)-36.5 °C (97.7 °F)] 36.3 °C (97.3 °F)  Pulse:  [80-89] 89  Resp:  [18] 18  BP: (105-126)/(72-87) 114/77  SpO2:  [98 %-100 %] 98 %    Physical Exam  Constitutional:       General: He is not in acute distress.     Appearance: Normal appearance.   HENT:      Head: Normocephalic and atraumatic.      Right Ear: Ear canal and external ear normal.      Left Ear: Ear canal and external ear normal.      Nose: Nose normal. No congestion or rhinorrhea.      Mouth/Throat:      Mouth: Mucous membranes are moist.      Pharynx: Oropharynx is clear. No oropharyngeal exudate or posterior oropharyngeal erythema.   Eyes:      General: No scleral icterus.     Extraocular Movements: Extraocular movements intact.      Right eye: Nystagmus present.      Left eye: Nystagmus present.      Conjunctiva/sclera:      Right eye: Right conjunctiva is not injected.      Left eye: Left conjunctiva is not injected.      Pupils: Pupils are equal,  round, and reactive to light.   Cardiovascular:      Rate and Rhythm: Normal rate and regular rhythm.      Pulses: Normal pulses.      Heart sounds: No murmur heard.  Pulmonary:      Effort: Pulmonary effort is normal. No respiratory distress.      Breath sounds: Normal breath sounds.   Abdominal:      General: Abdomen is flat. Bowel sounds are normal. There is no distension.      Palpations: Abdomen is soft.      Tenderness: There is no abdominal tenderness.   Musculoskeletal:      Cervical back: Normal range of motion. No rigidity or tenderness.      Right lower leg: No edema.      Left lower leg: No edema.   Skin:     Coloration: Skin is not jaundiced.      Findings: No bruising.   Neurological:      General: No focal deficit present.      Mental Status: He is alert. He is disoriented.      Cranial Nerves: No cranial nerve deficit.      Motor: Tremor present.   Psychiatric:         Mood and Affect: Mood normal.         Behavior: Behavior normal.         Thought Content: Thought content normal.         Fluids    Intake/Output Summary (Last 24 hours) at 11/8/2024 1124  Last data filed at 11/8/2024 0941  Gross per 24 hour   Intake 679 ml   Output 1900 ml   Net -1221 ml       Laboratory  Recent Labs     11/06/24  0454 11/07/24  0112 11/08/24  0025   WBC 8.8 6.8 7.0   RBC 2.58* 2.67* 2.76*   HEMOGLOBIN 8.9* 9.1* 9.6*   HEMATOCRIT 26.6* 27.4* 27.9*   .1* 102.6* 101.1*   MCH 34.5* 34.1* 34.8*   MCHC 33.5 33.2 34.4   RDW 55.5* 54.8* 55.5*   PLATELETCT 142* 147* 161*   MPV 10.9 11.1 10.8     Recent Labs     11/06/24  0454 11/07/24  0112 11/08/24  0025   SODIUM 136 136 138   POTASSIUM 4.7 4.2 4.2   CHLORIDE 107 107 104   CO2 20 21 20   GLUCOSE 86 94 105*   BUN 5* 4* 6*   CREATININE 0.50 0.49* 0.68   CALCIUM 9.0 8.6 9.6                   Imaging  IR-US GUIDED PIV   Final Result    Ultrasound-guided PERIPHERAL IV INSERTION performed by    qualified nursing staff as above.      US-RUQ   Final Result      1.   Hepatomegaly with hepatic steatosis      2.  Mildly thickened gallbladder wall which may be secondary to hypoproteinemia, hypoalbuminemia, or inflammation.      3.  Sludge-filled gallbladder.      CT-HEAD W/O   Final Result      1.  No evidence of acute intracranial process.      2.  Atrophy.               DX-CHEST-PORTABLE (1 VIEW)   Final Result      No evidence of acute cardiopulmonary process.           Assessment/Plan  Problem Representation:    * Altered mental status- (present on admission)  Assessment & Plan  Assessment: The patient presents with altered mental status, disorientation, and a history of alcohol abuse. No acute intracranial process was found on CT. Last known well was this past Thursday.  Based off signs and symptoms concern for underlying Warnicke's encephalopathy given extensive alcohol use (1.5 L of liquor daily).  Differential also includes hepatic encephalopathy in the setting of possible underlying cirrhosis.    Plan:   -Neurochecks every 4 hours   -Continue high-dose IV thiamine IV per pharmacy's recommendation for Korsakoff  -Continue monitor mentation. Reassess mental status regularly.   -Ensure safety measures to prevent falls or injuries.    Hepatic steatosis  Assessment & Plan  Assessment: Ultrasound shows hepatic steatosis and hepatomegaly, likely related to chronic alcohol use.  Plan: Monitor liver function tests. Encourage alcohol cessation. Consider hepatology consultation if liver function worsens.    Elevated creatine kinase level  Assessment & Plan  Assessment: CK elevated at 492 U/L, possibly due to alcohol myopathy or prolonged immobility.  Plan: Ensure adequate hydration. Monitor CK levels and renal function. Encourage mobilization as tolerated.    Thrombocytopenia (HCC)  Assessment & Plan  Assessment: Reduced platelet count (151 K/uL) on 11/05/2024, potentially related to alcohol use or liver dysfunction.  Plan: Monitor platelet levels. Assess for bleeding  risks.    Anemia  Assessment & Plan  Assessment: Low hemoglobin (9.9 g/dL), likely due to nutritional deficiencies. Labs on 11/7/2024 shows low Folic Acid.  Plan:   -Supplement as necessary. Increased dose of Folic Acid on 11/07/2024.  -Monitor complete blood count (CBC) and reticulocyte count.    Tobacco use  Assessment & Plan  Patient is a 30-pack-year history, still smoking 1 pack/day.  -Will need extensive counseling when clinically appropriate    Alcohol use  Assessment & Plan  Assessment: The patient has a significant history of alcohol abuse, consuming approximately 1.5 liters of vodka daily, with multiple prior admissions for withdrawal management.  Plan: Initiate [facility] protocol for withdrawal management. Administer thiamine and multivitamins. Provide counseling and discuss long-term treatment options for alcohol dependence, including referral to a rehabilitation program.    Prolonged QT interval  Assessment & Plan  Prolonged QTc of 543 noted on EKG, likely sequela of hyperkalemia.  -Telemetry monitoring  -Continue to optimize electrolytes (i.e. K and mag)    Transaminitis  Assessment & Plan  Mild transaminitis with AST of 124, ALT of 58 on presentation.  Alk phos also elevated at 318.  Likely alcoholic hepatitis in the setting of significant alcohol use.  Uncertain however likely that patient might have some form of liver disease secondary chronic alcohol abuse.  -Continue monitor/trend  -Obtaining RUQ ultrasound for further evaluation    Lactic acidosis- (present on admission)  Assessment & Plan  Assessment: Lactic acid initially elevated, now trending down, indicating possible metabolic stress.  Plan: Continue monitoring lactic acid levels. Ensure adequate oxygenation and perfusion. Adjust fluid management as needed.    Leukocytosis- (present on admission)  Assessment & Plan  Leukocytosis of 16.8 followed by 14.7 noted on presentation, neutrophilic predominant.  No signs of active infection at this time,  possible stress demargination in the setting of acute illness.  Received IV ceftriaxone in ED.  -Continue monitor/trend  -Low threshold to initiate antibiotics if concern for infection    Hypokalemia- (present on admission)  Assessment & Plan  RESOLVED. Patient had initial hypokalemia (now corrected to 4.2 mmol/L).         VTE prophylaxis:   SCDs      I have performed a physical exam and reviewed and updated ROS and Plan today (11/8/2024). In review of yesterday's note (11/7/2024), there are no changes except as documented above.

## 2024-11-08 NOTE — PROGRESS NOTES
Patient much more alert this morning, making conversation with RN.    Patient's speech sounds more clear and his responses are less delayed, but is still AxO 1 to person only, stating that he is in [place].

## 2024-11-09 LAB
ALBUMIN SERPL BCP-MCNC: 3.2 G/DL (ref 3.2–4.9)
ALBUMIN/GLOB SERPL: 1.2 G/DL
ALP SERPL-CCNC: 287 U/L (ref 30–99)
ALT SERPL-CCNC: 78 U/L (ref 2–50)
ANION GAP SERPL CALC-SCNC: 11 MMOL/L (ref 7–16)
AST SERPL-CCNC: 107 U/L (ref 12–45)
B-OH-BUTYR SERPL-MCNC: 0.11 MMOL/L (ref 0.02–0.27)
BACTERIA BLD CULT: NORMAL
BILIRUB SERPL-MCNC: 0.5 MG/DL (ref 0.1–1.5)
BUN SERPL-MCNC: 14 MG/DL (ref 8–22)
CALCIUM ALBUM COR SERPL-MCNC: 10.2 MG/DL (ref 8.5–10.5)
CALCIUM SERPL-MCNC: 9.6 MG/DL (ref 8.5–10.5)
CHLORIDE SERPL-SCNC: 103 MMOL/L (ref 96–112)
CO2 SERPL-SCNC: 22 MMOL/L (ref 20–33)
CREAT SERPL-MCNC: 0.64 MG/DL (ref 0.5–1.4)
ERYTHROCYTE [DISTWIDTH] IN BLOOD BY AUTOMATED COUNT: 56.2 FL (ref 35.9–50)
GFR SERPLBLD CREATININE-BSD FMLA CKD-EPI: 120 ML/MIN/1.73 M 2
GLOBULIN SER CALC-MCNC: 2.7 G/DL (ref 1.9–3.5)
GLUCOSE SERPL-MCNC: 97 MG/DL (ref 65–99)
HCT VFR BLD AUTO: 29.2 % (ref 42–52)
HGB BLD-MCNC: 10.1 G/DL (ref 14–18)
LACTATE SERPL-SCNC: 2 MMOL/L (ref 0.5–2)
MAGNESIUM SERPL-MCNC: 2 MG/DL (ref 1.5–2.5)
MCH RBC QN AUTO: 35.2 PG (ref 27–33)
MCHC RBC AUTO-ENTMCNC: 34.6 G/DL (ref 32.3–36.5)
MCV RBC AUTO: 101.7 FL (ref 81.4–97.8)
PHOSPHATE SERPL-MCNC: 4.4 MG/DL (ref 2.5–4.5)
PLATELET # BLD AUTO: 170 K/UL (ref 164–446)
PMV BLD AUTO: 11.4 FL (ref 9–12.9)
POTASSIUM SERPL-SCNC: 4.3 MMOL/L (ref 3.6–5.5)
PROT SERPL-MCNC: 5.9 G/DL (ref 6–8.2)
RBC # BLD AUTO: 2.87 M/UL (ref 4.7–6.1)
SIGNIFICANT IND 70042: NORMAL
SITE SITE: NORMAL
SODIUM SERPL-SCNC: 136 MMOL/L (ref 135–145)
SOURCE SOURCE: NORMAL
WBC # BLD AUTO: 8.1 K/UL (ref 4.8–10.8)

## 2024-11-09 PROCEDURE — A9270 NON-COVERED ITEM OR SERVICE: HCPCS | Performed by: HOSPITALIST

## 2024-11-09 PROCEDURE — 700111 HCHG RX REV CODE 636 W/ 250 OVERRIDE (IP)

## 2024-11-09 PROCEDURE — 82010 KETONE BODYS QUAN: CPT

## 2024-11-09 PROCEDURE — 83735 ASSAY OF MAGNESIUM: CPT

## 2024-11-09 PROCEDURE — 700102 HCHG RX REV CODE 250 W/ 637 OVERRIDE(OP): Performed by: STUDENT IN AN ORGANIZED HEALTH CARE EDUCATION/TRAINING PROGRAM

## 2024-11-09 PROCEDURE — 84100 ASSAY OF PHOSPHORUS: CPT

## 2024-11-09 PROCEDURE — A9270 NON-COVERED ITEM OR SERVICE: HCPCS | Performed by: STUDENT IN AN ORGANIZED HEALTH CARE EDUCATION/TRAINING PROGRAM

## 2024-11-09 PROCEDURE — 80053 COMPREHEN METABOLIC PANEL: CPT

## 2024-11-09 PROCEDURE — 770020 HCHG ROOM/CARE - TELE (206)

## 2024-11-09 PROCEDURE — 83605 ASSAY OF LACTIC ACID: CPT

## 2024-11-09 PROCEDURE — 700102 HCHG RX REV CODE 250 W/ 637 OVERRIDE(OP): Performed by: HOSPITALIST

## 2024-11-09 PROCEDURE — 99231 SBSQ HOSP IP/OBS SF/LOW 25: CPT | Performed by: INTERNAL MEDICINE

## 2024-11-09 PROCEDURE — 85027 COMPLETE CBC AUTOMATED: CPT

## 2024-11-09 PROCEDURE — 36415 COLL VENOUS BLD VENIPUNCTURE: CPT

## 2024-11-09 PROCEDURE — A9270 NON-COVERED ITEM OR SERVICE: HCPCS

## 2024-11-09 PROCEDURE — 700102 HCHG RX REV CODE 250 W/ 637 OVERRIDE(OP)

## 2024-11-09 RX ADMIN — THIAMINE HYDROCHLORIDE 200 MG: 100 INJECTION, SOLUTION INTRAMUSCULAR; INTRAVENOUS at 20:19

## 2024-11-09 RX ADMIN — THIAMINE HYDROCHLORIDE 200 MG: 100 INJECTION, SOLUTION INTRAMUSCULAR; INTRAVENOUS at 08:29

## 2024-11-09 RX ADMIN — OMEPRAZOLE 20 MG: 20 CAPSULE, DELAYED RELEASE ORAL at 04:26

## 2024-11-09 RX ADMIN — NICOTINE POLACRILEX 2 MG: 2 GUM, CHEWING BUCCAL at 21:30

## 2024-11-09 RX ADMIN — THIAMINE HYDROCHLORIDE 200 MG: 100 INJECTION, SOLUTION INTRAMUSCULAR; INTRAVENOUS at 16:15

## 2024-11-09 RX ADMIN — FOLIC ACID 5 MG: 1 TABLET ORAL at 04:26

## 2024-11-09 RX ADMIN — NICOTINE TRANSDERMAL SYSTEM 21 MG: 21 PATCH, EXTENDED RELEASE TRANSDERMAL at 04:26

## 2024-11-09 ASSESSMENT — LIFESTYLE VARIABLES
VISUAL DISTURBANCES: NOT PRESENT
AUDITORY DISTURBANCES: NOT PRESENT
VISUAL DISTURBANCES: VERY MILD SENSITIVITY
ORIENTATION AND CLOUDING OF SENSORIUM: CANNOT DO SERIAL ADDITIONS OR IS UNCERTAIN ABOUT DATE
ANXIETY: MILDLY ANXIOUS
ANXIETY: MILDLY ANXIOUS
HEADACHE, FULLNESS IN HEAD: NOT PRESENT
ANXIETY: MILDLY ANXIOUS
AUDITORY DISTURBANCES: NOT PRESENT
ORIENTATION AND CLOUDING OF SENSORIUM: CANNOT DO SERIAL ADDITIONS OR IS UNCERTAIN ABOUT DATE
TREMOR: TREMOR NOT VISIBLE BUT CAN BE FELT, FINGERTIP TO FINGERTIP
ORIENTATION AND CLOUDING OF SENSORIUM: CANNOT DO SERIAL ADDITIONS OR IS UNCERTAIN ABOUT DATE
TREMOR: TREMOR NOT VISIBLE BUT CAN BE FELT, FINGERTIP TO FINGERTIP
AGITATION: NORMAL ACTIVITY
VISUAL DISTURBANCES: NOT PRESENT
TOTAL SCORE: 3
ANXIETY: MILDLY ANXIOUS
NAUSEA AND VOMITING: NO NAUSEA AND NO VOMITING
PAROXYSMAL SWEATS: NO SWEAT VISIBLE
NAUSEA AND VOMITING: NO NAUSEA AND NO VOMITING
NAUSEA AND VOMITING: NO NAUSEA AND NO VOMITING
TREMOR: TREMOR NOT VISIBLE BUT CAN BE FELT, FINGERTIP TO FINGERTIP
VISUAL DISTURBANCES: NOT PRESENT
PAROXYSMAL SWEATS: NO SWEAT VISIBLE
NAUSEA AND VOMITING: NO NAUSEA AND NO VOMITING
AGITATION: NORMAL ACTIVITY
TOTAL SCORE: 3
ORIENTATION AND CLOUDING OF SENSORIUM: CANNOT DO SERIAL ADDITIONS OR IS UNCERTAIN ABOUT DATE
HEADACHE, FULLNESS IN HEAD: NOT PRESENT
ORIENTATION AND CLOUDING OF SENSORIUM: CANNOT DO SERIAL ADDITIONS OR IS UNCERTAIN ABOUT DATE
VISUAL DISTURBANCES: VERY MILD SENSITIVITY
ANXIETY: MILDLY ANXIOUS
TOTAL SCORE: 3
PAROXYSMAL SWEATS: NO SWEAT VISIBLE
TREMOR: NO TREMOR
HEADACHE, FULLNESS IN HEAD: NOT PRESENT
VISUAL DISTURBANCES: NOT PRESENT
TOTAL SCORE: 3
AGITATION: NORMAL ACTIVITY
TREMOR: NO TREMOR
AUDITORY DISTURBANCES: NOT PRESENT
ORIENTATION AND CLOUDING OF SENSORIUM: CANNOT DO SERIAL ADDITIONS OR IS UNCERTAIN ABOUT DATE
ANXIETY: MILDLY ANXIOUS
TOTAL SCORE: 2
AGITATION: NORMAL ACTIVITY
NAUSEA AND VOMITING: NO NAUSEA AND NO VOMITING
HEADACHE, FULLNESS IN HEAD: NOT PRESENT
PAROXYSMAL SWEATS: NO SWEAT VISIBLE
TREMOR: NO TREMOR
HEADACHE, FULLNESS IN HEAD: NOT PRESENT
AGITATION: NORMAL ACTIVITY
PAROXYSMAL SWEATS: NO SWEAT VISIBLE
NAUSEA AND VOMITING: NO NAUSEA AND NO VOMITING
TOTAL SCORE: 3
PAROXYSMAL SWEATS: NO SWEAT VISIBLE
AGITATION: NORMAL ACTIVITY
AUDITORY DISTURBANCES: NOT PRESENT
HEADACHE, FULLNESS IN HEAD: NOT PRESENT
AUDITORY DISTURBANCES: NOT PRESENT
AUDITORY DISTURBANCES: NOT PRESENT

## 2024-11-09 ASSESSMENT — ENCOUNTER SYMPTOMS
FALLS: 0
HALLUCINATIONS: 0
CHILLS: 0
DIARRHEA: 0
PALPITATIONS: 0
HEADACHES: 0
BRUISES/BLEEDS EASILY: 0
SORE THROAT: 0
DEPRESSION: 0
SEIZURES: 0
BLURRED VISION: 0
EYE PAIN: 0
SHORTNESS OF BREATH: 0
NAUSEA: 0
CONSTIPATION: 0
ABDOMINAL PAIN: 0
WEAKNESS: 0
LOSS OF CONSCIOUSNESS: 0
COUGH: 0
VOMITING: 0
FEVER: 0

## 2024-11-09 ASSESSMENT — FIBROSIS 4 INDEX: FIB4 SCORE: 3.91

## 2024-11-09 ASSESSMENT — PAIN DESCRIPTION - PAIN TYPE
TYPE: ACUTE PAIN
TYPE: ACUTE PAIN

## 2024-11-09 NOTE — PROGRESS NOTES
[facility] Internal Medicine Daily Progress Note    Date of Service  11/9/2024    [facility] Team: R IM Blue Team   Attending: Sarah Gamino M.d.  Senior Resident: Risa Julien MD  Intern: Robert Scales MD  Contact Number: [phone]    Chief Complaint  Raj Sharpe is a 44 y.o. male admitted 11/3/2024 with:  Admission Diagnosis: Altered mental status [R41.82]  Chief Complaint   Patient presents with    ALOC     BIBA pt was found down on the floor in his bathroom today by family checking on him. He lives alone, LKW this Thursday. Pt altered, GCS 11, some bruising to torso, no other obvious signs of trauma. NSR, 158BGL PTA. Pt has a hx of EtOH abuse.          Hospital Course  44-year-old male with a history of significant alcohol abuse, presented on 11/3/2024 with altered mental status. His girlfriend, who provided the history, found him minimally responsive in the bathroom after returning from out of town. He was last seen normal the previous Thursday. The patient had been experiencing unsteady gait and double vision two weeks prior and was consuming approximately 1.5 liters of vodka daily. He has a history of multiple admissions requiring aggressive [place] protocol measures. CT head and chest X-ray showed no acute abnormalities.    The patient was initially admitted to the Intermediate Care Unit (IMCU) for management of encephalopathy with severe hypokalemia. Patient has been transferred to medicine floor on 11/05/2024.    The patient initially received intravenous thiamine at a dosage of 500 mg three times daily for approximately three days. This dosage was then reduced to 200 mg three times daily, with plans to maintain this regimen for one to two weeks before transitioning to oral administration.    Interval Problem Update  On examination, the patient remains oriented only to time and demonstrates confabulation. Additionally, the patient exhibits horizontal nystagmus.     Patient's father and sister to come  over tomorrow (Sunday) and talk to the team.     I have discussed this patient's plan of care and discharge plan at IDT rounds today with Case Management, Nursing, Nursing leadership, and other members of the IDT team.    Consultants/Specialty  None    Code Status  Full Code    Disposition  The patient is not medically cleared for discharge to home or a post-acute facility.      I have placed the appropriate orders for post-discharge needs.    Review of Systems  Review of Systems   Constitutional:  Negative for chills and fever.   HENT:  Negative for ear pain and sore throat.    Eyes:  Negative for blurred vision and pain.   Respiratory:  Negative for cough and shortness of breath.    Cardiovascular:  Negative for chest pain, palpitations and leg swelling.   Gastrointestinal:  Negative for abdominal pain, constipation, diarrhea, nausea and vomiting.   Genitourinary:  Negative for dysuria, frequency, hematuria and urgency.   Musculoskeletal:  Negative for falls and joint pain.   Skin:  Negative for rash.   Neurological:  Negative for seizures, loss of consciousness, weakness and headaches.   Endo/Heme/Allergies:  Negative for environmental allergies. Does not bruise/bleed easily.   Psychiatric/Behavioral:  Negative for depression, hallucinations and suicidal ideas.         Physical Exam  Temp:  [36.3 °C (97.3 °F)-36.7 °C (98.1 °F)] 36.7 °C (98.1 °F)  Pulse:  [] 90  Resp:  [14-18] 16  BP: (104-127)/(71-92) 127/92  SpO2:  [92 %-98 %] 92 %    Physical Exam  Constitutional:       General: He is not in acute distress.     Appearance: Normal appearance.   HENT:      Head: Normocephalic and atraumatic.      Right Ear: Ear canal and external ear normal.      Left Ear: Ear canal and external ear normal.      Nose: Nose normal. No congestion or rhinorrhea.      Mouth/Throat:      Mouth: Mucous membranes are moist.      Pharynx: Oropharynx is clear. No oropharyngeal exudate or posterior oropharyngeal erythema.   Eyes:       General: No scleral icterus.     Extraocular Movements: Extraocular movements intact.      Right eye: Nystagmus present.      Left eye: Nystagmus present.      Conjunctiva/sclera:      Right eye: Right conjunctiva is not injected.      Left eye: Left conjunctiva is not injected.      Pupils: Pupils are equal, round, and reactive to light.   Cardiovascular:      Rate and Rhythm: Normal rate and regular rhythm.      Pulses: Normal pulses.      Heart sounds: No murmur heard.  Pulmonary:      Effort: Pulmonary effort is normal. No respiratory distress.      Breath sounds: Normal breath sounds.   Abdominal:      General: Abdomen is flat. Bowel sounds are normal. There is no distension.      Palpations: Abdomen is soft.      Tenderness: There is no abdominal tenderness.   Musculoskeletal:      Cervical back: Normal range of motion. No rigidity or tenderness.      Right lower leg: No edema.      Left lower leg: No edema.   Skin:     Coloration: Skin is not jaundiced.      Findings: No bruising.   Neurological:      General: No focal deficit present.      Mental Status: He is alert. He is disoriented.      Cranial Nerves: No cranial nerve deficit.      Motor: Tremor present.   Psychiatric:         Mood and Affect: Mood normal.         Behavior: Behavior normal.         Thought Content: Thought content normal.         Fluids    Intake/Output Summary (Last 24 hours) at 11/9/2024 1436  Last data filed at 11/9/2024 1035  Gross per 24 hour   Intake 120 ml   Output 1025 ml   Net -905 ml       Laboratory  Recent Labs     11/07/24 0112 11/08/24 0025 11/09/24  0247   WBC 6.8 7.0 8.1   RBC 2.67* 2.76* 2.87*   HEMOGLOBIN 9.1* 9.6* 10.1*   HEMATOCRIT 27.4* 27.9* 29.2*   .6* 101.1* 101.7*   MCH 34.1* 34.8* 35.2*   MCHC 33.2 34.4 34.6   RDW 54.8* 55.5* 56.2*   PLATELETCT 147* 161* 170   MPV 11.1 10.8 11.4     Recent Labs     11/07/24 0112 11/08/24 0025 11/09/24  0247   SODIUM 136 138 136   POTASSIUM 4.2 4.2 4.3   CHLORIDE 107  104 103   CO2 21 20 22   GLUCOSE 94 105* 97   BUN 4* 6* 14   CREATININE 0.49* 0.68 0.64   CALCIUM 8.6 9.6 9.6                   Imaging  IR-US GUIDED PIV   Final Result    Ultrasound-guided PERIPHERAL IV INSERTION performed by    qualified nursing staff as above.      US-RUQ   Final Result      1.  Hepatomegaly with hepatic steatosis      2.  Mildly thickened gallbladder wall which may be secondary to hypoproteinemia, hypoalbuminemia, or inflammation.      3.  Sludge-filled gallbladder.      CT-HEAD W/O   Final Result      1.  No evidence of acute intracranial process.      2.  Atrophy.               DX-CHEST-PORTABLE (1 VIEW)   Final Result      No evidence of acute cardiopulmonary process.           Assessment/Plan  Problem Representation:    * Altered mental status- (present on admission)  Assessment & Plan  Assessment: The patient presents with altered mental status, disorientation, and a history of alcohol abuse. No acute intracranial process was found on CT. Last known well was this past Thursday.  Based off signs and symptoms concern for underlying Warnicke's encephalopathy given extensive alcohol use (1.5 L of liquor daily).  Differential also includes hepatic encephalopathy in the setting of possible underlying cirrhosis.    Plan:   -Neurochecks every 4 hours   -Continue high-dose IV thiamine IV per pharmacy's recommendation for Korsakoff  -Continue monitor mentation. Reassess mental status regularly.   -Ensure safety measures to prevent falls or injuries.    Hepatic steatosis  Assessment & Plan  Assessment: Ultrasound shows hepatic steatosis and hepatomegaly, likely related to chronic alcohol use.  Plan: Monitor liver function tests. Encourage alcohol cessation. Consider hepatology consultation if liver function worsens.    Elevated creatine kinase level  Assessment & Plan  Assessment: CK elevated at 492 U/L, possibly due to alcohol myopathy or prolonged immobility.  Plan: Ensure adequate hydration.  Monitor CK levels and renal function. Encourage mobilization as tolerated.    Thrombocytopenia (HCC)  Assessment & Plan  Assessment: Reduced platelet count (151 K/uL) on 11/05/2024, potentially related to alcohol use or liver dysfunction.  Plan: Monitor platelet levels. Assess for bleeding risks.    Anemia  Assessment & Plan  Assessment: Low hemoglobin (9.9 g/dL), likely due to nutritional deficiencies. Labs on 11/7/2024 shows low Folic Acid.  Plan:   -Supplement as necessary. Increased dose of Folic Acid on 11/07/2024.  -Monitor complete blood count (CBC) and reticulocyte count.    Tobacco use  Assessment & Plan  Patient is a 30-pack-year history, still smoking 1 pack/day.  -Will need extensive counseling when clinically appropriate    Alcohol use  Assessment & Plan  Assessment: The patient has a significant history of alcohol abuse, consuming approximately 1.5 liters of vodka daily, with multiple prior admissions for withdrawal management.  Plan: Initiate [facility] protocol for withdrawal management. Administer thiamine and multivitamins. Provide counseling and discuss long-term treatment options for alcohol dependence, including referral to a rehabilitation program.    Prolonged QT interval  Assessment & Plan  Prolonged QTc of 543 noted on EKG, likely sequela of hyperkalemia.  -Telemetry monitoring  -Continue to optimize electrolytes (i.e. K and mag)    Transaminitis  Assessment & Plan  Mild transaminitis with AST of 124, ALT of 58 on presentation.  Alk phos also elevated at 318.  Likely alcoholic hepatitis in the setting of significant alcohol use.  Uncertain however likely that patient might have some form of liver disease secondary chronic alcohol abuse.  -Continue monitor/trend  -Obtaining RUQ ultrasound for further evaluation    Lactic acidosis- (present on admission)  Assessment & Plan  Assessment: Lactic acid initially elevated, now trending down, indicating possible metabolic stress.  Plan: Continue monitoring  lactic acid levels. Ensure adequate oxygenation and perfusion. Adjust fluid management as needed.    Leukocytosis- (present on admission)  Assessment & Plan  Leukocytosis of 16.8 followed by 14.7 noted on presentation, neutrophilic predominant.  No signs of active infection at this time, possible stress demargination in the setting of acute illness.  Received IV ceftriaxone in ED.  -Continue monitor/trend  -Low threshold to initiate antibiotics if concern for infection    Hypokalemia- (present on admission)  Assessment & Plan  RESOLVED. Patient had initial hypokalemia (now corrected to 4.2 mmol/L).         VTE prophylaxis: SCDs/TEDs    I have performed a physical exam and reviewed and updated ROS and Plan today (11/9/2024). In review of yesterday's note (11/8/2024), there are no changes except as documented above.

## 2024-11-09 NOTE — CARE PLAN
The patient is Stable - Low risk of patient condition declining or worsening    Shift Goals  Clinical Goals: Fall precautions, CIWA, Neuro checks  Patient Goals: rest, comfort  Family Goals: alyson    Progress made toward(s) clinical / shift goals:    Problem: Fall Risk  Goal: Patient will remain free from falls  Outcome: Progressing  Note: Patient has fall risk precautions in place. Patient has tele sitter as well as bed alarm on. Patient is rounded on hourly to ensure safety and is reminded frequently that patient has a condom cath.      Problem: Neuro Status  Goal: Neuro status will remain stable or improve  Note: Patient has q4 neuro assessments patient remains only oriented to self with occasionally stating he is in [place]. Patient frequently states that he is on a boat or in a small house. Patient remains disoriented to time stating the year is 2021. Patient also has no understanding of situation and does not understand why or when he came to the hospital        Patient is not progressing towards the following goals:

## 2024-11-09 NOTE — CARE PLAN
The patient is Watcher - Medium risk of patient condition declining or worsening    Shift Goals  Clinical Goals: CIWA, neuro checks  Patient Goals: EDUARDO  Family Goals: eduardo    Progress made toward(s) clinical / shift goals:    Problem: Optimal Care for Alcohol Withdrawal  Goal: Optimal Care for the alcohol withdrawal patient  Description: Target End Date:  1 to 3 days    1.  Alcohol history screening done on admission  2.  CIWA-AR score assessment (includes assessment of nausea/vomiting, tremor, sweats, anxiety, agitation, tactile, visual and auditory disturbances, headache and orientation/sensorium).  Document on CIWA flowsheet.  3.  Follow CIWA-AR score protocol  4.  Frequent reorientation  5.  Monitor for fluid and electrolyte imbalance.  6.  Assess for respiratory depression due to sedation (pulse ox)  7.  Consider thiamine, multivitamins, folic acid and magnesium sulfate per provider order  8.  Collaborate with Social Workers/Case Management  9.  Collaborate with mental health  Outcome: Progressing       Patient is not progressing towards the following goals:      Problem: Knowledge Deficit - Standard  Goal: Patient and family/care givers will demonstrate understanding of plan of care, disease process/condition, diagnostic tests and medications  Description: Target End Date:  1-3 days or as soon as patient condition allows    Document in Patient Education    1.  Patient and family/caregiver oriented to unit, equipment, visitation policy and means for communicating concern  2.  Complete/review Learning Assessment  3.  Assess knowledge level of disease process/condition, treatment plan, diagnostic tests and medications  4.  Explain disease process/condition, treatment plan, diagnostic tests and medications  Outcome: Not Met

## 2024-11-09 NOTE — CARE PLAN
The patient is Stable - Low risk of patient condition declining or worsening    Shift Goals  Clinical Goals: Safety, Stable labs  Patient Goals: Rest  Family Goals: EDUARDO    Progress made toward(s) clinical / shift goals:    Problem: Optimal Care for Alcohol Withdrawal  Goal: Optimal Care for the alcohol withdrawal patient  Description: Target End Date:  1 to 3 days    1.  Alcohol history screening done on admission  2.  CIWA-AR score assessment (includes assessment of nausea/vomiting, tremor, sweats, anxiety, agitation, tactile, visual and auditory disturbances, headache and orientation/sensorium).  Document on CIWA flowsheet.  3.  Follow CIWA-AR score protocol  4.  Frequent reorientation  5.  Monitor for fluid and electrolyte imbalance.  6.  Assess for respiratory depression due to sedation (pulse ox)  7.  Consider thiamine, multivitamins, folic acid and magnesium sulfate per provider order  8.  Collaborate with Social Workers/Case Management  9.  Collaborate with mental health  Outcome: Progressing  Note: No s/s of ETOH withdrawal at this time.      Problem: Fall Risk  Goal: Patient will remain free from falls  Description: Target End Date:  Prior to discharge or change in level of care    Document interventions on the Bhumi Aviles Fall Risk Assessment    1.  Assess for fall risk factors  2.  Implement fall precautions  Outcome: Progressing  Note: Patient has remained free of falls this shift. Instructed patient to use call light for help. Call light always placed within reach when leaving room. Patient's room has been cleared of clutter such as cords and extra chairs.

## 2024-11-10 LAB
HIV 1+2 AB+HIV1 P24 AG SERPL QL IA: NORMAL
T PALLIDUM AB SER QL IA: NORMAL
TSH SERPL-ACNC: 2.95 UIU/ML (ref 0.35–5.5)

## 2024-11-10 PROCEDURE — A9270 NON-COVERED ITEM OR SERVICE: HCPCS

## 2024-11-10 PROCEDURE — 36415 COLL VENOUS BLD VENIPUNCTURE: CPT

## 2024-11-10 PROCEDURE — 770001 HCHG ROOM/CARE - MED/SURG/GYN PRIV*

## 2024-11-10 PROCEDURE — A9270 NON-COVERED ITEM OR SERVICE: HCPCS | Performed by: HOSPITALIST

## 2024-11-10 PROCEDURE — 700102 HCHG RX REV CODE 250 W/ 637 OVERRIDE(OP)

## 2024-11-10 PROCEDURE — 700111 HCHG RX REV CODE 636 W/ 250 OVERRIDE (IP)

## 2024-11-10 PROCEDURE — 84443 ASSAY THYROID STIM HORMONE: CPT

## 2024-11-10 PROCEDURE — 87389 HIV-1 AG W/HIV-1&-2 AB AG IA: CPT

## 2024-11-10 PROCEDURE — 99231 SBSQ HOSP IP/OBS SF/LOW 25: CPT | Performed by: INTERNAL MEDICINE

## 2024-11-10 PROCEDURE — 700102 HCHG RX REV CODE 250 W/ 637 OVERRIDE(OP): Performed by: STUDENT IN AN ORGANIZED HEALTH CARE EDUCATION/TRAINING PROGRAM

## 2024-11-10 PROCEDURE — 700102 HCHG RX REV CODE 250 W/ 637 OVERRIDE(OP): Performed by: HOSPITALIST

## 2024-11-10 PROCEDURE — 86780 TREPONEMA PALLIDUM: CPT

## 2024-11-10 PROCEDURE — A9270 NON-COVERED ITEM OR SERVICE: HCPCS | Performed by: STUDENT IN AN ORGANIZED HEALTH CARE EDUCATION/TRAINING PROGRAM

## 2024-11-10 RX ADMIN — THIAMINE HYDROCHLORIDE 200 MG: 100 INJECTION, SOLUTION INTRAMUSCULAR; INTRAVENOUS at 09:03

## 2024-11-10 RX ADMIN — NICOTINE TRANSDERMAL SYSTEM 21 MG: 21 PATCH, EXTENDED RELEASE TRANSDERMAL at 05:33

## 2024-11-10 RX ADMIN — THIAMINE HYDROCHLORIDE 200 MG: 100 INJECTION, SOLUTION INTRAMUSCULAR; INTRAVENOUS at 20:09

## 2024-11-10 RX ADMIN — FOLIC ACID 5 MG: 1 TABLET ORAL at 05:33

## 2024-11-10 RX ADMIN — OMEPRAZOLE 20 MG: 20 CAPSULE, DELAYED RELEASE ORAL at 05:32

## 2024-11-10 RX ADMIN — THIAMINE HYDROCHLORIDE 200 MG: 100 INJECTION, SOLUTION INTRAMUSCULAR; INTRAVENOUS at 16:41

## 2024-11-10 ASSESSMENT — ENCOUNTER SYMPTOMS
HEADACHES: 0
DIARRHEA: 0
HALLUCINATIONS: 0
ABDOMINAL PAIN: 0
FEVER: 0
BLURRED VISION: 0
EYE PAIN: 0
PALPITATIONS: 0
COUGH: 0
DEPRESSION: 0
SEIZURES: 0
SHORTNESS OF BREATH: 0
NAUSEA: 0
CHILLS: 0
LOSS OF CONSCIOUSNESS: 0
VOMITING: 0
FALLS: 0
SORE THROAT: 0
WEAKNESS: 0
CONSTIPATION: 0
BRUISES/BLEEDS EASILY: 0

## 2024-11-10 ASSESSMENT — LIFESTYLE VARIABLES
VISUAL DISTURBANCES: MODERATELY SEVERE HALLUCINATIONS
PAROXYSMAL SWEATS: NO SWEAT VISIBLE
TOTAL SCORE: 5
PAROXYSMAL SWEATS: NO SWEAT VISIBLE
HEADACHE, FULLNESS IN HEAD: NOT PRESENT
ORIENTATION AND CLOUDING OF SENSORIUM: DISORIENTED FOR PLACE AND / OR PERSON
ANXIETY: *
AGITATION: NORMAL ACTIVITY
AUDITORY DISTURBANCES: NOT PRESENT
TOTAL SCORE: 6
TOTAL SCORE: 4
ANXIETY: *
HEADACHE, FULLNESS IN HEAD: NOT PRESENT
HEADACHE, FULLNESS IN HEAD: NOT PRESENT
NAUSEA AND VOMITING: NO NAUSEA AND NO VOMITING
TREMOR: NO TREMOR
PAROXYSMAL SWEATS: NO SWEAT VISIBLE
AUDITORY DISTURBANCES: NOT PRESENT
TREMOR: NO TREMOR
TOTAL SCORE: 6
TOTAL SCORE: 5
ORIENTATION AND CLOUDING OF SENSORIUM: DISORIENTED FOR PLACE AND / OR PERSON
AGITATION: *
PAROXYSMAL SWEATS: NO SWEAT VISIBLE
ORIENTATION AND CLOUDING OF SENSORIUM: CANNOT DO SERIAL ADDITIONS OR IS UNCERTAIN ABOUT DATE
HEADACHE, FULLNESS IN HEAD: NOT PRESENT
VISUAL DISTURBANCES: NOT PRESENT
VISUAL DISTURBANCES: NOT PRESENT
PAROXYSMAL SWEATS: NO SWEAT VISIBLE
NAUSEA AND VOMITING: NO NAUSEA AND NO VOMITING
NAUSEA AND VOMITING: NO NAUSEA AND NO VOMITING
AUDITORY DISTURBANCES: NOT PRESENT
NAUSEA AND VOMITING: NO NAUSEA AND NO VOMITING
ANXIETY: MILDLY ANXIOUS
ANXIETY: MILDLY ANXIOUS
AGITATION: NORMAL ACTIVITY
TOTAL SCORE: 11
NAUSEA AND VOMITING: NO NAUSEA AND NO VOMITING
NAUSEA AND VOMITING: NO NAUSEA AND NO VOMITING
AUDITORY DISTURBANCES: NOT PRESENT
HEADACHE, FULLNESS IN HEAD: NOT PRESENT
VISUAL DISTURBANCES: NOT PRESENT
HEADACHE, FULLNESS IN HEAD: NOT PRESENT
TREMOR: NO TREMOR
TREMOR: NO TREMOR
ORIENTATION AND CLOUDING OF SENSORIUM: ORIENTED AND CAN DO SERIAL ADDITIONS
AUDITORY DISTURBANCES: NOT PRESENT
ORIENTATION AND CLOUDING OF SENSORIUM: CANNOT DO SERIAL ADDITIONS OR IS UNCERTAIN ABOUT DATE
ANXIETY: NO ANXIETY (AT EASE)
AUDITORY DISTURBANCES: NOT PRESENT
TREMOR: NO TREMOR
AGITATION: NORMAL ACTIVITY
TREMOR: NO TREMOR
VISUAL DISTURBANCES: MODERATELY SEVERE HALLUCINATIONS
AGITATION: NORMAL ACTIVITY
ANXIETY: *
AGITATION: SOMEWHAT MORE THAN NORMAL ACTIVITY
PAROXYSMAL SWEATS: NO SWEAT VISIBLE
ORIENTATION AND CLOUDING OF SENSORIUM: DISORIENTED FOR PLACE AND / OR PERSON
VISUAL DISTURBANCES: MILD SENSITIVITY

## 2024-11-10 ASSESSMENT — FIBROSIS 4 INDEX: FIB4 SCORE: 3.06

## 2024-11-10 ASSESSMENT — PATIENT HEALTH QUESTIONNAIRE - PHQ9
1. LITTLE INTEREST OR PLEASURE IN DOING THINGS: NOT AT ALL
SUM OF ALL RESPONSES TO PHQ9 QUESTIONS 1 AND 2: 0

## 2024-11-10 ASSESSMENT — PAIN DESCRIPTION - PAIN TYPE
TYPE: ACUTE PAIN
TYPE: ACUTE PAIN

## 2024-11-10 NOTE — PROGRESS NOTES
[facility] Internal Medicine Daily Progress Note    Date of Service  11/10/2024    R Team: R IM Blue Team   Attending: Sarah Gamino M.d.  Senior Resident: Risa Julien MD  Intern: Robert Scales MD  Contact Number: [phone]    Chief Complaint  Rja Sharpe is a 44 y.o. male admitted 11/3/2024 with:  Admission Diagnosis: Altered mental status [R41.82]  Chief Complaint   Patient presents with    ALOC     BIBA pt was found down on the floor in his bathroom today by family checking on him. He lives alone, LKW this Thursday. Pt altered, GCS 11, some bruising to torso, no other obvious signs of trauma. NSR, 158BGL PTA. Pt has a hx of EtOH abuse.          Hospital Course  Raj Sharpe is a 44-year-old male with a history of significant alcohol abuse, presented on 11/3/2024 with altered mental status. His girlfriend, who provided the history, found him minimally responsive in the bathroom after returning from out of town. He was last seen normal the previous Thursday. The patient had been experiencing unsteady gait and double vision two weeks prior and was consuming approximately 1.5 liters of vodka daily. He has a history of multiple admissions requiring aggressive WA protocol measures. CT head and chest X-ray showed no acute abnormalities.    The patient was initially admitted to the Intermediate Care Unit (IMCU) for management of encephalopathy with severe hypokalemia. Patient has been transferred to medicine floor on 11/05/2024.    The patient initially received intravenous thiamine at a dosage of 500 mg three times daily for approximately three days. This dosage was then reduced to 200 mg three times daily, with plans to maintain this regimen for one to two weeks before transitioning to oral administration.    Interval Problem Update  Overnight had some  increased agitation. Seen at bedside this morning, similar to when last seen by me- confabulating (saying that we used to know each other in the  past). Will plan to speak with his family today. Continuing high dose thiamine.     I have discussed this patient's plan of care and discharge plan at IDT rounds today with Case Management, Nursing, Nursing leadership, and other members of the IDT team.    Consultants/Specialty  None    Code Status  Full Code    Disposition  The patient is medically cleared for discharge to home or a post-acute facility.      I have placed the appropriate orders for post-discharge needs.    Review of Systems  Review of Systems   Constitutional:  Negative for chills and fever.   HENT:  Negative for ear pain and sore throat.    Eyes:  Negative for blurred vision and pain.   Respiratory:  Negative for cough and shortness of breath.    Cardiovascular:  Negative for chest pain, palpitations and leg swelling.   Gastrointestinal:  Negative for abdominal pain, constipation, diarrhea, nausea and vomiting.   Genitourinary:  Negative for dysuria, frequency, hematuria and urgency.   Musculoskeletal:  Negative for falls and joint pain.   Skin:  Negative for rash.   Neurological:  Negative for seizures, loss of consciousness, weakness and headaches.   Endo/Heme/Allergies:  Negative for environmental allergies. Does not bruise/bleed easily.   Psychiatric/Behavioral:  Negative for depression, hallucinations and suicidal ideas.         Physical Exam  Temp:  [36.2 °C (97.2 °F)-36.7 °C (98.1 °F)] 36.2 °C (97.2 °F)  Pulse:  [71-94] 71  Resp:  [16] 16  BP: (112-127)/(77-92) 121/83  SpO2:  [92 %-98 %] 98 %    Physical Exam  Constitutional:       General: He is not in acute distress.     Appearance: Normal appearance.   HENT:      Head: Normocephalic and atraumatic.      Right Ear: Ear canal and external ear normal.      Left Ear: Ear canal and external ear normal.      Nose: Nose normal. No congestion or rhinorrhea.      Mouth/Throat:      Mouth: Mucous membranes are moist.      Pharynx: Oropharynx is clear. No oropharyngeal exudate or posterior  oropharyngeal erythema.   Eyes:      General: No scleral icterus.     Extraocular Movements: Extraocular movements intact.      Right eye: Nystagmus present.      Left eye: Nystagmus present.      Conjunctiva/sclera:      Right eye: Right conjunctiva is not injected.      Left eye: Left conjunctiva is not injected.      Pupils: Pupils are equal, round, and reactive to light.   Cardiovascular:      Rate and Rhythm: Normal rate and regular rhythm.      Pulses: Normal pulses.      Heart sounds: No murmur heard.  Pulmonary:      Effort: Pulmonary effort is normal. No respiratory distress.      Breath sounds: Normal breath sounds.   Abdominal:      General: Abdomen is flat. Bowel sounds are normal. There is no distension.      Palpations: Abdomen is soft.      Tenderness: There is no abdominal tenderness.   Musculoskeletal:      Cervical back: Normal range of motion. No rigidity or tenderness.      Right lower leg: No edema.      Left lower leg: No edema.   Skin:     Coloration: Skin is not jaundiced.      Findings: No bruising.   Neurological:      General: No focal deficit present.      Mental Status: He is alert. He is disoriented.      Cranial Nerves: No cranial nerve deficit.      Motor: Tremor present.      Comments: Oriented to name only    Psychiatric:         Mood and Affect: Mood normal.         Behavior: Behavior normal.         Thought Content: Thought content normal.         Fluids    Intake/Output Summary (Last 24 hours) at 11/10/2024 1209  Last data filed at 11/10/2024 0828  Gross per 24 hour   Intake 240 ml   Output 600 ml   Net -360 ml       Laboratory  Recent Labs     11/08/24 0025 11/09/24 0247   WBC 7.0 8.1   RBC 2.76* 2.87*   HEMOGLOBIN 9.6* 10.1*   HEMATOCRIT 27.9* 29.2*   .1* 101.7*   MCH 34.8* 35.2*   MCHC 34.4 34.6   RDW 55.5* 56.2*   PLATELETCT 161* 170   MPV 10.8 11.4     Recent Labs     11/08/24 0025 11/09/24 0247   SODIUM 138 136   POTASSIUM 4.2 4.3   CHLORIDE 104 103   CO2 20 22    GLUCOSE 105* 97   BUN 6* 14   CREATININE 0.68 0.64   CALCIUM 9.6 9.6                   Imaging  IR-US GUIDED PIV   Final Result    Ultrasound-guided PERIPHERAL IV INSERTION performed by    qualified nursing staff as above.      US-RUQ   Final Result      1.  Hepatomegaly with hepatic steatosis      2.  Mildly thickened gallbladder wall which may be secondary to hypoproteinemia, hypoalbuminemia, or inflammation.      3.  Sludge-filled gallbladder.      CT-HEAD W/O   Final Result      1.  No evidence of acute intracranial process.      2.  Atrophy.               DX-CHEST-PORTABLE (1 VIEW)   Final Result      No evidence of acute cardiopulmonary process.           Assessment/Plan  Problem Representation:    * Altered mental status- (present on admission)  Assessment & Plan  Assessment: The patient presents with altered mental status, disorientation, and a history of alcohol abuse concerning for toxic encephalopathy. No acute intracranial process was found on CT. Last known well was this past Thursday.  Based off signs and symptoms concern for underlying Warnicke's encephalopathy given extensive alcohol use (1.5 L of liquor daily).  Differential also includes progression to Korsakoff, versus  hepatic encephalopathy in the setting of possible underlying cirrhosis  Plan:   -Screen for syphilis, HIV, thyroid disorder  -Neurochecks every 4 hours   -Continue high-dose IV thiamine IV per pharmacy's recommendation for Korsakoff  -Ensure safety measures to prevent falls or injuries.    Hepatic steatosis  Assessment & Plan  Assessment: Ultrasound shows hepatic steatosis and hepatomegaly, likely related to chronic alcohol use.  Plan: Monitor liver function tests. Encourage alcohol cessation. Consider hepatology consultation if liver function worsens.    Elevated creatine kinase level  Assessment & Plan  Assessment: CK elevated at 492 U/L, possibly due to alcohol myopathy or prolonged immobility.  Plan: Ensure adequate  hydration. Encourage mobilization as tolerated.    Thrombocytopenia (HCC)  Assessment & Plan  Assessment: Reduced platelet count (151 K/uL) on 11/05/2024, potentially related to alcohol use or liver dysfunction.  Plan: Monitor platelet levels. Assess for bleeding risks.    Anemia  Assessment & Plan  Assessment: Low hemoglobin (9.9 g/dL), likely due to nutritional deficiencies. Labs on 11/7/2024 shows low Folic Acid.  Plan:   -Supplement as necessary. Increased dose of Folic Acid on 11/07/2024.  -Monitor complete blood count (CBC) and reticulocyte count.    Tobacco use  Assessment & Plan  Patient is a 30-pack-year history, still smoking 1 pack/day.  -Will need extensive counseling when clinically appropriate    Alcohol use  Assessment & Plan  Assessment: The patient has a significant history of alcohol abuse, consuming approximately 1.5 liters of vodka daily, with multiple prior admissions for withdrawal management.  Plan: Initiate [facility] protocol for withdrawal management. Administer thiamine and multivitamins. Provide counseling and discuss long-term treatment options for alcohol dependence, including referral to a rehabilitation program.    Prolonged QT interval  Assessment & Plan  Prolonged QTc of 543 noted on EKG, likely sequela of hyperkalemia.  -Telemetry monitoring  -Continue to optimize electrolytes (i.e. K and mag)    Transaminitis  Assessment & Plan  Mild transaminitis with AST of 124, ALT of 58 on presentation.  Alk phos also elevated at 318.  Likely alcoholic hepatitis in the setting of significant alcohol use.  Uncertain however likely that patient might have some form of liver disease secondary chronic alcohol abuse.  -Continue monitor/trend  -Obtaining RUQ ultrasound for further evaluation    Lactic acidosis- (present on admission)  Assessment & Plan  Resolved   Assessment: Lactic acid initially elevated, now trending down, indicating possible metabolic stress.  Plan: Continue monitoring lactic acid  levels. Ensure adequate oxygenation and perfusion. Adjust fluid management as needed.    Leukocytosis- (present on admission)  Assessment & Plan  Resolved   Leukocytosis of 16.8 followed by 14.7 noted on presentation, neutrophilic predominant.  No signs of active infection at this time, possible stress demargination in the setting of acute illness.  Received IV ceftriaxone in ED.  -Continue monitor/trend  -Low threshold to initiate antibiotics if concern for infection    Hypokalemia- (present on admission)  Assessment & Plan  RESOLVED. Patient had initial hypokalemia (now corrected to 4.2 mmol/L).         VTE prophylaxis: SCDs/TEDs    I have performed a physical exam and reviewed and updated ROS and Plan today (11/10/2024). In review of yesterday's note (11/9/2024), there are no changes except as documented above.

## 2024-11-10 NOTE — PROGRESS NOTES
Patient scored on CIWA per protocol patient scored 11 when in the previous two days patient has scored below 5. DAMARIS vilchis reached out to provider about change in patient. MD is aware. RN mentioned Concern this is not related to CIWA and provider is in agreement[facility]

## 2024-11-10 NOTE — CARE PLAN
The patient is Watcher - Medium risk of patient condition declining or worsening    Shift Goals  Clinical Goals: CIWA, Neuro Checks, Saftey  Patient Goals: safety  Family Goals: alyson    Progress made toward(s) clinical / shift goals:    Problem: Knowledge Deficit - Standard  Goal: Patient and family/care givers will demonstrate understanding of plan of care, disease process/condition, diagnostic tests and medications  Outcome: Not Progressing  Note: Patient is constantly reminded and re-educated on purpose of admission. Patient is unaware that he is in hospital and that he was brought in by ambulance. RN reminds patient of situation place and time. RN continues to do neuro and CIWA checks      Problem: Neuro Status  Goal: Neuro status will remain stable or improve  Outcome: Progressing  Note: Patient is more alert and his speech is clearer. Patient is still confused on situation, place and time. Patient however did state that we were near [place]. Patient also says he saw a man come into the room with a bag of his fathers war memorabilia. Patient has had no visitors over night. Patient was reminded that he is in the hospital and that he is safe here.        Patient is not progressing towards the following goals:      Problem: Knowledge Deficit - Standard  Goal: Patient and family/care givers will demonstrate understanding of plan of care, disease process/condition, diagnostic tests and medications  Outcome: Not Progressing  Note: Patient is constantly reminded and re-educated on purpose of admission. Patient is unaware that he is in hospital and that he was brought in by ambulance. RN reminds patient of situation place and time. RN continues to do neuro and CIWA checks

## 2024-11-10 NOTE — DISCHARGE PLANNING
Case Management Discharge Planning    Admission Date: 11/3/2024  GMLOS: 3.3  ALOS: 6    6-Clicks ADL Score: 13  6-Clicks Mobility Score: 12  PT and/or OT Eval ordered: Yes  Post-acute Referrals Ordered: Yes  Post-acute Choice Obtained: No  Has referral(s) been sent to post-acute provider:  No    Anticipated Discharge Dispo: Discharge Disposition: D/T to SNF with Medicare cert in anticipation of skilled care (03)    DME Needed: No    Action(s) Taken:   Pt was discussed during IDT rounds. Per MD, pt is not yet medically cleared.   Anticipated discharge disposition is post-acute placement.     Per chart review, pt is listed as self-pay. Pt's significant other, Sarah, was contacted and confirmed she completed and submitted the COBRA insurance packet on Friday, Nov 8th after hours. Sarah indicated she was informed it can take 24-48 business hours to be submitted into their system and can take another 5-8 business days for processing.     Julian with [facility] confirmed via teams message that pt has been accepted for a lease bed tomorrow if medically cleared.     Escalations Completed: None    Medically Clear: No    Next Steps:  CM RN to follow up with medical team to discuss discharge barriers or needs.     Barriers to Discharge: Medical clearance and Pending Placement

## 2024-11-10 NOTE — DISCHARGE PLANNING
DPA confirmed with Julian at [facility] that they are able to accept pt tomorrow on a leased bed.

## 2024-11-10 NOTE — PROGRESS NOTES
Patient was increasingly agitated, confused, and hallucinating objects and family members this morning. Patient was requiring frequent reorientation.This RN notified MD Arshad of these changes and MD is currently aware.     After conversation with MD, we do not believe this new agitation and confusion is from alcohol withdraw symptoms.     Rapid nurse was consulted as nurse concern and added to rapid rounds.    No new orders at this time from MD.

## 2024-11-11 VITALS
OXYGEN SATURATION: 96 % | HEIGHT: 69 IN | SYSTOLIC BLOOD PRESSURE: 113 MMHG | WEIGHT: 143.3 LBS | DIASTOLIC BLOOD PRESSURE: 77 MMHG | RESPIRATION RATE: 16 BRPM | BODY MASS INDEX: 21.22 KG/M2 | TEMPERATURE: 97.5 F | HEART RATE: 69 BPM

## 2024-11-11 PROBLEM — D72.829 LEUKOCYTOSIS: Status: RESOLVED | Noted: 2024-08-09 | Resolved: 2024-11-11

## 2024-11-11 PROBLEM — E87.20 LACTIC ACIDOSIS: Status: RESOLVED | Noted: 2024-11-04 | Resolved: 2024-11-11

## 2024-11-11 PROBLEM — R74.8 ELEVATED CREATINE KINASE LEVEL: Status: RESOLVED | Noted: 2024-11-05 | Resolved: 2024-11-11

## 2024-11-11 PROBLEM — D69.6 THROMBOCYTOPENIA (HCC): Status: RESOLVED | Noted: 2024-11-05 | Resolved: 2024-11-11

## 2024-11-11 PROBLEM — E87.6 HYPOKALEMIA: Status: RESOLVED | Noted: 2024-08-09 | Resolved: 2024-11-11

## 2024-11-11 PROCEDURE — 700102 HCHG RX REV CODE 250 W/ 637 OVERRIDE(OP): Performed by: STUDENT IN AN ORGANIZED HEALTH CARE EDUCATION/TRAINING PROGRAM

## 2024-11-11 PROCEDURE — A9270 NON-COVERED ITEM OR SERVICE: HCPCS

## 2024-11-11 PROCEDURE — 700102 HCHG RX REV CODE 250 W/ 637 OVERRIDE(OP)

## 2024-11-11 PROCEDURE — A9270 NON-COVERED ITEM OR SERVICE: HCPCS | Performed by: STUDENT IN AN ORGANIZED HEALTH CARE EDUCATION/TRAINING PROGRAM

## 2024-11-11 PROCEDURE — A9270 NON-COVERED ITEM OR SERVICE: HCPCS | Performed by: HOSPITALIST

## 2024-11-11 PROCEDURE — 86480 TB TEST CELL IMMUN MEASURE: CPT

## 2024-11-11 PROCEDURE — 36415 COLL VENOUS BLD VENIPUNCTURE: CPT

## 2024-11-11 PROCEDURE — 99239 HOSP IP/OBS DSCHRG MGMT >30: CPT | Mod: GC | Performed by: INTERNAL MEDICINE

## 2024-11-11 PROCEDURE — 700102 HCHG RX REV CODE 250 W/ 637 OVERRIDE(OP): Performed by: HOSPITALIST

## 2024-11-11 RX ORDER — FOLIC ACID 1 MG/1
5 TABLET ORAL DAILY
Status: SHIPPED
Start: 2024-11-12

## 2024-11-11 RX ORDER — LANOLIN ALCOHOL/MO/W.PET/CERES
100 CREAM (GRAM) TOPICAL 3 TIMES DAILY
Status: SHIPPED
Start: 2024-11-11

## 2024-11-11 RX ORDER — NICOTINE 21 MG/24HR
1 PATCH, TRANSDERMAL 24 HOURS TRANSDERMAL EVERY 24 HOURS
Status: SHIPPED
Start: 2024-11-11

## 2024-11-11 RX ORDER — OMEPRAZOLE 20 MG/1
20 CAPSULE, DELAYED RELEASE ORAL DAILY
Status: SHIPPED
Start: 2024-11-12

## 2024-11-11 RX ADMIN — FOLIC ACID 5 MG: 1 TABLET ORAL at 05:03

## 2024-11-11 RX ADMIN — Medication 100 MG: at 09:40

## 2024-11-11 RX ADMIN — OMEPRAZOLE 20 MG: 20 CAPSULE, DELAYED RELEASE ORAL at 05:03

## 2024-11-11 RX ADMIN — NICOTINE TRANSDERMAL SYSTEM 21 MG: 21 PATCH, EXTENDED RELEASE TRANSDERMAL at 05:03

## 2024-11-11 ASSESSMENT — LIFESTYLE VARIABLES
HEADACHE, FULLNESS IN HEAD: NOT PRESENT
TREMOR: NO TREMOR
AGITATION: NORMAL ACTIVITY
ANXIETY: NO ANXIETY (AT EASE)
AUDITORY DISTURBANCES: NOT PRESENT
AGITATION: NORMAL ACTIVITY
VISUAL DISTURBANCES: NOT PRESENT
PAROXYSMAL SWEATS: NO SWEAT VISIBLE
AUDITORY DISTURBANCES: NOT PRESENT
ORIENTATION AND CLOUDING OF SENSORIUM: DISORIENTED FOR PLACE AND / OR PERSON
ORIENTATION AND CLOUDING OF SENSORIUM: DISORIENTED FOR PLACE AND / OR PERSON
TOTAL SCORE: 4
NAUSEA AND VOMITING: NO NAUSEA AND NO VOMITING
TOTAL SCORE: 4
NAUSEA AND VOMITING: NO NAUSEA AND NO VOMITING
VISUAL DISTURBANCES: NOT PRESENT
TREMOR: NO TREMOR
PAROXYSMAL SWEATS: NO SWEAT VISIBLE
HEADACHE, FULLNESS IN HEAD: NOT PRESENT
ANXIETY: NO ANXIETY (AT EASE)

## 2024-11-11 ASSESSMENT — PAIN DESCRIPTION - PAIN TYPE: TYPE: ACUTE PAIN

## 2024-11-11 NOTE — CARE PLAN
The patient is Watcher - Medium risk of patient condition declining or worsening    Shift Goals  Clinical Goals: Q4 neuro checks, safety  Patient Goals: comfort  Family Goals: alyson    Progress made toward(s) clinical / shift goals:      Problem: Knowledge Deficit - Standard  Goal: Patient and family/care givers will demonstrate understanding of plan of care, disease process/condition, diagnostic tests and medications  Description: Target End Date:  1-3 days or as soon as patient condition allows    Document in Patient Education    1.  Patient and family/caregiver oriented to unit, equipment, visitation policy and means for communicating concern  2.  Complete/review Learning Assessment  3.  Assess knowledge level of disease process/condition, treatment plan, diagnostic tests and medications  4.  Explain disease process/condition, treatment plan, diagnostic tests and medications  Outcome: Progressing  Note: Patient has been educated on plan of care, medications, and safety. Patient is only oriented to self and requires frequent orientation     Problem: Optimal Care for Alcohol Withdrawal  Goal: Optimal Care for the alcohol withdrawal patient  Description: Target End Date:  1 to 3 days    1.  Alcohol history screening done on admission  2.  CIWA-AR score assessment (includes assessment of nausea/vomiting, tremor, sweats, anxiety, agitation, tactile, visual and auditory disturbances, headache and orientation/sensorium).  Document on CIWA flowsheet.  3.  Follow CIWA-AR score protocol  4.  Frequent reorientation  5.  Monitor for fluid and electrolyte imbalance.  6.  Assess for respiratory depression due to sedation (pulse ox)  7.  Consider thiamine, multivitamins, folic acid and magnesium sulfate per provider order  8.  Collaborate with Social Workers/Case Management  9.  Collaborate with mental health  Outcome: Progressing  Note: Patient is still on CIWA monitoring but confusion seems to not be withdrawal related. Q 4  neuro checks have also been done     Problem: Skin Integrity  Goal: Skin integrity is maintained or improved  Description: Target End Date:  Prior to discharge or change in level of care    Document interventions on Skin Risk/Javon flowsheet groups and corresponding LDA    1.  Assess and monitor skin integrity, appearance and/or temperature  2.  Assess risk factors for impaired skin integrity and/or pressures ulcers  3.  Implement precautions to protect skin integrity in collaboration with interdisciplinary team  4.  Implement pressure ulcer prevention protocol if at risk for skin breakdown  5.  Confirm wound care consult if at risk for skin breakdown  6.  Ensure patient use of pressure relieving devices  (Low air loss bed, waffle overlay, heel protectors, ROHO cushion, etc)  Outcome: Progressing  Note: GAUTAM bed, Q2 turns, pillows used for support and positioning, condom cath in use, and heel mepilex placed to promote skin integrity.      Problem: Fall Risk  Goal: Patient will remain free from falls  Description: Target End Date:  Prior to discharge or change in level of care    Document interventions on the Bhumi Aviles Fall Risk Assessment    1.  Assess for fall risk factors  2.  Implement fall precautions  Outcome: Progressing  Note: Patient has been educated on fall risk and safety. Bed alarm on, tele sitter placed, frequent rounding done, frequent reorientation, and call light placed within reach.        Patient is not progressing towards the following goals:

## 2024-11-11 NOTE — DISCHARGE PLANNING
DC Transport Scheduled    Transport Company Scheduled:  [facility]  Spoke with Steve ramirez [place] to schedule transport.    Scheduled Date: 11/11/2024  Scheduled Time: 1315    Transport Type: [facility]  Destination:   [facility]   Destination address: [address] [place], [place] [address] [id number]      Notified care team of scheduled transport via [employer].     If there are any changes needed to the DC transportation scheduled, please contact [facility] at ext. [phone] between the hours of 6096-6992 Mon-Fri. If outside those hours, contact the ED Case Manager at ext. [phone].

## 2024-11-11 NOTE — DISCHARGE INSTRUCTIONS
Discharge Instructions    Discharged to other by medical transportation with escort. Discharged via ambulance, hospital escort: Yes.  Special equipment needed: Not Applicable    Be sure to schedule a follow-up appointment with your primary care doctor or any specialists as instructed.     Discharge Plan:   Influenza Vaccine Indication: Patient Refuses    I understand that a diet low in cholesterol, fat, and sodium is recommended for good health. Unless I have been given specific instructions below for another diet, I accept this instruction as my diet prescription.   Other diet: N/A    Special Instructions: None    -Is this patient being discharged with medication to prevent blood clots?  No    Is patient discharged on Warfarin / Coumadin?   No

## 2024-11-11 NOTE — DISCHARGE SUMMARY
[facility] Discharge Summary    Attending: Randy Porras M.d.  Senior Resident: Dr. Risa Julien  Intern:  Dr. Robert Scales   Contact Number: [phone]    CHIEF COMPLAINT ON ADMISSION  Chief Complaint   Patient presents with    ALOC     BIBA pt was found down on the floor in his bathroom today by family checking on him. He lives alone, LKW this Thursday. Pt altered, GCS 11, some bruising to torso, no other obvious signs of trauma. NSR, 158BGL PTA. Pt has a hx of EtOH abuse.        Reason for Admission  Altered mental status     Admission Date  11/3/2024    CODE STATUS  Full Code    HPI & HOSPITAL COURSE    Raj Sharpe is a 44-year-old male with a history of significant alcohol use disorder who presented on 11/3/2024 with altered mental status. His girlfriend provided the history and reported she found him minimally responsive in the bathroom after returning from out of town. He was last seen normal three days prior. The patient had been experiencing unsteady gait and double vision two weeks prior and was consuming approximately 1.5 liters of vodka daily. In the ER, CPK was elevated, he was hypokalemic, and he had lactic acidosis, though blood alcohol levels were within normal limits and urine drug screen was negative. There was no evidence of infection; CT head and chest X-ray showed no acute abnormalities. He was admitted to the [facility] for management of encephalopathy with severe hypokalemia.    The patient initially received intravenous thiamine at a dosage of 500 mg three times daily for approximately three days. This dosage was then reduced to 200 mg three times daily, with plans to maintain this regimen for one to two weeks before transitioning to oral administration however his mentation did not return to presumed baseline and he was highly unsteady on his feet so due to concern of ongoing Wernicke he was maintained on high dose thiamine. He did however start to confabulate, for example  reporting that we knew each other several years ago and reporting having met new nurses several days ago. Thus there is concern for progression to Korsakoff.     Of note he was found to be remarkably folic acid deficient.     Therefore, he is discharged in fair and stable condition to skilled nursing facility.    The patient met 2-midnight criteria for an inpatient stay at the time of discharge.    Discharge Date  11/11/24     Physical Exam on Day of Discharge  Physical Exam  Vitals and nursing note reviewed.   Constitutional:       General: He is not in acute distress.     Appearance: Normal appearance. He is not ill-appearing, toxic-appearing or diaphoretic.      Comments: Thin    HENT:      Head: Normocephalic and atraumatic.   Eyes:      General: No scleral icterus.     Comments: Bilateral nystagmus    Cardiovascular:      Rate and Rhythm: Normal rate.   Pulmonary:      Effort: Pulmonary effort is normal.   Musculoskeletal:      Right lower leg: No edema.      Left lower leg: No edema.   Skin:     Coloration: Skin is pale. Skin is not jaundiced.   Neurological:      Comments: Oriented only to self          FOLLOW UP ITEMS POST DISCHARGE  #Wernicke/Korsakoff  -Continue high dose daily thiamine  -Ongoing family discussions, planning for post-discharge placement pending cognitive and physical improvement     #Anemia  -Recommend follow up CBC within one week  -Continue folate supplementation     DISCHARGE DIAGNOSES  Principal Problem:    Altered mental status (POA: Yes)  Active Problems:    Transaminitis (POA: Unknown)    Prolonged QT interval (POA: Unknown)    Alcohol use (POA: Unknown)    Tobacco use (POA: Unknown)    Anemia (POA: Unknown)    Hepatic steatosis (POA: Unknown)  Resolved Problems:    Hypokalemia (POA: Yes)    Leukocytosis (POA: Yes)    Lactic acidosis (POA: Yes)    Thrombocytopenia (HCC) (POA: Unknown)    Elevated creatine kinase level (POA: Unknown)      FOLLOW UP  No future appointments.  [facility]  [address] [id number]  [phone]          MEDICATIONS ON DISCHARGE     Medication List        START taking these medications        Instructions   folic acid 1 MG Tabs  Start taking on: November 12, 2024  Commonly known as: Folvite   Take 5 Tablets by mouth every day.  Dose: 5 mg     nicotine 21 MG/24HR Pt24  Commonly known as: Nicoderm   Place 1 Patch on the skin every 24 hours.  Dose: 1 Patch     nicotine polacrilex 2 MG Gum  Commonly known as: Nicorette   Take 1 Each by mouth every 1 hour as needed for Smoking Cessation (For nicotine urge).  Dose: 2 mg     omeprazole 20 MG delayed-release capsule  Start taking on: November 12, 2024  Commonly known as: PriLOSEC   Take 1 Capsule by mouth every day.  Dose: 20 mg     thiamine 100 MG tablet  Commonly known as: Thiamine   Take 1 Tablet by mouth in the morning, at noon, and at bedtime.  Dose: 100 mg              Allergies  No Known Allergies    DIET  Orders Placed This Encounter   Procedures    Diet Order Diet: Level 6 - Soft and Bite Sized; Liquid level: Level 0 - Thin; Tray Modifications (optional): SLP - 1:1 Supervision by Nursing, SLP - Deliver to Nursing Station     Standing Status:   Standing     Number of Occurrences:   1     Order Specific Question:   Diet:     Answer:   Level 6 - Soft and Bite Sized [23]     Order Specific Question:   Liquid level     Answer:   Level 0 - Thin     Order Specific Question:   Tray Modifications (optional)     Answer:   SLP - 1:1 Supervision by Nursing     Order Specific Question:   Tray Modifications (optional)     Answer:   SLP - Deliver to Nursing Station       ACTIVITY  As tolerated and directed by skilled nursing.  Weight bearing as tolerated    LINES, DRAINS, AND WOUNDS  This is an automated list. Peripheral IVs will be removed prior to discharge.  Peripheral IV 11/06/24 20 G Anterior;Left Forearm (Active)   Site Assessment Clean;Dry;Intact 11/10/24 2239   Dressing Type Transparent 11/10/24  1929   Line Status Saline locked 11/10/24 1929   Dressing Status Clean;Dry;Intact 11/10/24 1929   Dressing Intervention N/A 11/10/24 1929   Dressing Change Due 11/16/24 11/08/24 1945   Date Primary Tubing Changed 11/09/24 11/09/24 2100   Date IV Connector(s) Changed 11/06/24 11/06/24 1642   Infiltration Grading ([facility], [facility]) 0 11/10/24 1929   Phlebitis Scale ([facility] Only) 0 11/10/24 1929     External Urinary Catheter (Condom) (Active)   Collection Container Standard drainage bag 11/07/24 0900   Output (mL) 600 mL 11/10/24 0447      Wound 11/04/24 Pressure Injury Shoulder Right POA DTI (Active)   Wound Image   11/05/24 0800   Site Assessment Clean;Dry;Intact 11/10/24 2000   Periwound Assessment Clean;Intact;Dry 11/10/24 2000   Margins Defined edges 11/10/24 2000   Closure Open to air 11/10/24 2000   Drainage Amount None 11/10/24 2000   Treatments Offloading 11/10/24 2000   Offloading/DME Other (comment) 11/08/24 0745   Wound Cleansing Approved Wound Cleanser 11/08/24 0745   Periwound Protectant Not Applicable 11/08/24 0745   Dressing Status Open to Air 11/10/24 2000   Dressing Changed Observed 11/08/24 0745   Dressing Cleansing/Solutions Not Applicable 11/06/24 2015   Dressing Options Offloading Dressing - Sacral 11/06/24 2015   Dressing Change/Treatment Frequency Every 72 hrs, and As Needed 11/07/24 0800   NEXT Dressing Change/Treatment Date 11/07/24 11/06/24 0000   NEXT Weekly Photo (Inpatient Only) 11/13/24 11/05/24 0000   Wound Team Following Not following 11/04/24 1000   WOUND NURSE ONLY - Pressure Injury Stage DTPI 11/04/24 1000   Wound Length (cm) 3.5 cm 11/04/24 1000   Wound Width (cm) 7.2 cm 11/04/24 1000   Wound Surface Area (cm^2) 25.2 cm^2 11/04/24 1000   Shape circular 11/04/24 1000       Wound 11/03/24 Pressure Injury Shoulder Left POA St 2 or revealing DTI (Active)   Wound Image   11/05/24 0800   Site Assessment Clean;Dry;Intact 11/10/24 2000   Periwound Assessment Clean;Dry;Intact 11/10/24 2000    Margins Defined edges 11/10/24 2000   Closure Open to air 11/10/24 2000   Drainage Amount None 11/10/24 2000   Treatments Offloading 11/10/24 2000   Offloading/DME Other (comment) 11/08/24 2000   Wound Cleansing Soap and Water 11/06/24 2015   Periwound Protectant Not Applicable 11/06/24 2015   Dressing Status Open to Air 11/10/24 2000   Dressing Changed Observed 11/06/24 2015   Dressing Cleansing/Solutions Not Applicable 11/06/24 2015   Dressing Options Offloading Dressing - Sacral 11/05/24 0800   Dressing Change/Treatment Frequency Every 72 hrs, and As Needed 11/07/24 0800   NEXT Dressing Change/Treatment Date 11/08/24 11/06/24 0000   NEXT Weekly Photo (Inpatient Only) 11/13/24 11/05/24 0000   Wound Team Following Not following 11/04/24 1000   WOUND NURSE ONLY - Pressure Injury Stage DTPI 11/04/24 1000   Wound Length (cm) 9 cm 11/04/24 1000   Wound Width (cm) 7 cm 11/04/24 1000   Wound Surface Area (cm^2) 63 cm^2 11/04/24 1000       Wound 11/04/24 Abrasion Knee Anterior Left (Active)   Wound Image   11/05/24 0800   Site Assessment Clean;Dry;Intact;Pink;Red 11/10/24 2000   Periwound Assessment Clean;Dry;Intact;Pink 11/10/24 2000   Margins Attached edges;Defined edges 11/10/24 2000   Closure Open to air 11/10/24 2000   Drainage Amount None 11/10/24 2000   Treatments Site care;Offloading 11/10/24 2000   Offloading/DME Other (comment) 11/06/24 2015   Wound Cleansing Approved Wound Cleanser 11/06/24 2015   Periwound Protectant Not Applicable 11/06/24 2015   Dressing Status Open to Air 11/10/24 2000   Dressing Changed Observed 11/06/24 2015   Dressing Cleansing/Solutions Not Applicable 11/06/24 2015   Dressing Options Offloading Dressing - Heel 11/06/24 2015   Dressing Change/Treatment Frequency Every 72 hrs, and As Needed 11/07/24 0800   NEXT Dressing Change/Treatment Date 11/07/24 11/05/24 0000   NEXT Weekly Photo (Inpatient Only) 11/13/24 11/05/24 0000   Wound Team Following Not following 11/04/24 1000   Wound  Length (cm) 21 cm 11/04/24 1000   Wound Width (cm) 15 cm 11/04/24 1000   Wound Surface Area (cm^2) 315 cm^2 11/04/24 1000   Shape irregular 11/04/24 1000       Wound 11/04/24 Pressure Injury Back Lateral;Upper Right POA DTI (Active)   Wound Image   11/07/24 0800   Site Assessment Clean;Dry;Intact 11/10/24 2000   Periwound Assessment Clean;Dry;Intact;Purple;Pink 11/10/24 2000   Margins Defined edges;Attached edges 11/10/24 2000   Closure Open to air 11/10/24 2000   Drainage Amount None 11/10/24 2000   Treatments Offloading 11/08/24 0745   Offloading/DME Other (comment) 11/07/24 0800   Wound Cleansing Approved Wound Cleanser 11/06/24 2015   Periwound Protectant Not Applicable 11/06/24 2015   Dressing Status Open to Air 11/10/24 2000   Dressing Changed New 11/07/24 0800   Dressing Cleansing/Solutions Not Applicable 11/06/24 2015   Dressing Options Offloading Dressing - Sacral 11/07/24 0800   Dressing Change/Treatment Frequency Every 72 hrs, and As Needed 11/07/24 0800   NEXT Dressing Change/Treatment Date 11/07/24 11/06/24 0000   NEXT Weekly Photo (Inpatient Only) 11/13/24 11/05/24 0000   Wound Team Following Not following 11/04/24 1000   WOUND NURSE ONLY - Pressure Injury Stage DTPI 11/04/24 1000   Wound Length (cm) 3.5 cm 11/04/24 1000   Wound Width (cm) 7.2 cm 11/04/24 1000   Wound Surface Area (cm^2) 25.2 cm^2 11/04/24 1000   Shape oval 11/04/24 1000       Peripheral IV 11/06/24 20 G Anterior;Left Forearm (Active)   Site Assessment Clean;Dry;Intact 11/10/24 1929   Dressing Type Transparent 11/10/24 1929   Line Status Saline locked 11/10/24 1929   Dressing Status Clean;Dry;Intact 11/10/24 1929   Dressing Intervention N/A 11/10/24 1929   Dressing Change Due 11/16/24 11/08/24 1945   Date Primary Tubing Changed 11/09/24 11/09/24 2100   Date IV Connector(s) Changed 11/06/24 11/06/24 1642   Infiltration Grading ([facility]) 0 11/10/24 1929   Phlebitis Scale ([facility] Only) 0 11/10/24 1929               MENTAL STATUS ON  TRANSFER   A&O x self        CONSULTATIONS  None     PROCEDURES  None    LABORATORY  Lab Results   Component Value Date    SODIUM 136 11/09/2024    POTASSIUM 4.3 11/09/2024    CHLORIDE 103 11/09/2024    CO2 22 11/09/2024    GLUCOSE 97 11/09/2024    BUN 14 11/09/2024    CREATININE 0.64 11/09/2024        Lab Results   Component Value Date    WBC 8.1 11/09/2024    HEMOGLOBIN 10.1 (L) 11/09/2024    HEMATOCRIT 29.2 (L) 11/09/2024    PLATELETCT 170 11/09/2024        Total time of the discharge process: 50 minutes.

## 2024-11-11 NOTE — PROGRESS NOTES
Discharging Patient to [facility] per physician order.  Discharged with Medical Transportation.  Demonstrated understanding of discharge instructions.  Ambulating without assistance, voiding via Condom Catheter, pain well controlled, tolerating oral medications, oxygen saturation greater than 90% , tolerating diet. Educational handouts given and discussed.  Verbalized understanding of discharge instructions and educational handouts.  Stated several reasons why to return to ED or seek medical attention. All questions answered.  Belongings with patient at time of discharge.

## 2024-11-11 NOTE — CARE PLAN
Problem: Knowledge Deficit - Standard  Goal: Patient and family/care givers will demonstrate understanding of plan of care, disease process/condition, diagnostic tests and medications  Outcome: Progressing     Problem: Optimal Care for Alcohol Withdrawal  Goal: Optimal Care for the alcohol withdrawal patient  Outcome: Progressing     Problem: Lifestyle Changes  Goal: Patient's ability to identify lifestyle changes and available resources to help reduce recurrence of condition will improve  Outcome: Progressing     Problem: Psychosocial  Goal: Patient's level of anxiety will decrease  Outcome: Progressing  Goal: Spiritual and cultural needs incorporated into hospitalization  Outcome: Progressing     Problem: Risk for Aspiration  Goal: Patient's risk for aspiration will be absent or decrease  Outcome: Progressing     Problem: Skin Integrity  Goal: Skin integrity is maintained or improved  Outcome: Progressing     Problem: Fall Risk  Goal: Patient will remain free from falls  Outcome: Progressing     Problem: Neuro Status  Goal: Neuro status will remain stable or improve  Outcome: Progressing   The patient is Stable - Low risk of patient condition declining or worsening    Shift Goals  Clinical Goals: Safety, Q4 Neuro Checks  Patient Goals: Comfort  Family Goals: N/A    Progress made toward(s) clinical / shift goals:  Safety Measures in place, patient free from falls at this time, Q4 Neuro Checks per Flowsheets, Patient to DC to [facility] this shift     Patient is not progressing towards the following goals:

## 2024-11-11 NOTE — DISCHARGE PLANNING
Case Management Discharge Planning    Admission Date: 11/3/2024  GMLOS: 3.3  ALOS: 7    6-Clicks ADL Score: 13  6-Clicks Mobility Score: 12  PT and/or OT Eval ordered: Yes  Post-acute Referrals Ordered: Yes  Post-acute Choice Obtained: Yes  Has referral(s) been sent to post-acute provider:  Yes      Anticipated Discharge Dispo: Discharge Disposition: D/T to SNF with Medicare cert in anticipation of skilled care (03)    DME Needed: No    Action(s) Taken: Updated Provider/Nurse on Discharge Plan, Choice obtained, and Acceptance Received Accepted by [facility] on a leased bed status, daughter and father are at bedside, plan is if he rehabs well to return to his SO at their apartment, if he needs assist after rehab he will live at his dads house. He has been unemployed since September 18 th.    Escalations Completed: Leadership    Medically Clear: Yes    Next Steps: Case Management will continue to follow for discharge planning needs.    Barriers to Discharge: Transportation    Is the patient up for discharge tomorrow: No

## 2024-11-11 NOTE — PROGRESS NOTES
Assumed care of patient at 0645. Bedside report received   Assessment complete.  AA&Ox4. Denies CP/SOB.  Neuro Checks Per Flowsheets- Telesitter in place for Safety   Reporting 0/10 pain. Declined intervention at this time.  Educated patient regarding pharmacologic and non pharmacologic modalities for pain management.  Skin per flowsheets  Tolerating L6 Soft and Bite Sized diet. Denies N/V.  + void via Condom Catheter. Last BM 11/11  Pt ambulates SBA.  All needs met at this time. Call light within reach. Pt calls appropriately. Bed low and locked, non skid socks in place. Hourly rounding in place.

## 2024-11-12 LAB
GAMMA INTERFERON BACKGROUND BLD IA-ACNC: 0.04 IU/ML
M TB IFN-G BLD-IMP: NEGATIVE
M TB IFN-G CD4+ BCKGRND COR BLD-ACNC: 0 IU/ML
MITOGEN IGNF BCKGRD COR BLD-ACNC: 0.85 IU/ML
QFT TB2 - NIL TBQ2: 0 IU/ML

## 2024-11-15 NOTE — ED PROVIDER NOTES
ED Provider Note    CHIEF COMPLAINT  Chief Complaint   Patient presents with   • Syncope     passed out at work this morning   denies injury     • ETOH Withdrawal   • Detox     has had issues with coming off the alcohol   • Bloody Stools     having red bloody stools        HPI  Raj Sharpe is a 41 y.o. male here for evaluation of near syncope.  The pt states he 'almost passed out' four days ago. He has no fever/chills.  No vomiting. He was at work, and 'slumped' over, but did not completely pass out, and he did not strike his head.  No cp, no sob, no vomiting.  He is an alcohol drinker, and sometimes 'does this when I stop drinking.'  He has no abdominal pain.  He does have a history of brb stool with drinking, but no abdominal pain or cramping.       ROS  See HPI for further details, o/w negative.     PAST MEDICAL HISTORY   has a past medical history of ETOH abuse and No pertinent past medical history.    SOCIAL HISTORY  Social History     Tobacco Use   • Smoking status: Current Every Day Smoker     Packs/day: 1.00     Years: 15.00     Pack years: 15.00   • Smokeless tobacco: Never Used   Vaping Use   • Vaping Use: Every day   • Substances: Nicotine   Substance and Sexual Activity   • Alcohol use: Yes     Alcohol/week: 1.2 oz     Types: 2 Cans of beer per week   • Drug use: Never   • Sexual activity: Yes     Partners: Female     Birth control/protection: None       Family History  No bleeding disorders     SURGICAL HISTORY  patient denies any surgical history    CURRENT MEDICATIONS  Home Medications     Reviewed by Terra Gambino R.N. (Registered Nurse) on 11/09/21 at 1428  Med List Status: Partial   Medication Last Dose Status   acyclovir (ZOVIRAX) 400 MG tablet Not Taking Active   clindamycin (CLEOCIN) 1 % Solution Not Taking Active   hydrocortisone 2.5 % Cream topical cream Not Taking Active                ALLERGIES  No Known Allergies    REVIEW OF SYSTEMS  See HPI for further details. Review of systems as  above, otherwise all other systems are negative.     PHYSICAL EXAM  Constitutional: Well developed, well nourished. No acute distress.  HEENT: Normocephalic, atraumatic. Posterior pharynx clear and moist.  Eyes:  EOMI. Normal sclera.  Neck: Supple, Full range of motion, nontender.  Chest/Pulmonary: clear to ausculation. Symmetrical expansion.   Cardio: Regular rate and rhythm with no murmur.   Abdomen: Soft, nontender. No peritoneal signs. No guarding. No palpable masses.  Musculoskeletal: No deformity, no edema, neurovascular intact.   Neuro: Clear speech, appropriate, cooperative, cranial nerves II-XII grossly intact. No tremors.   Psych: Normal mood and affect    PROCEDURES     MEDICAL RECORD  I have reviewed patient's medical record and pertinent results are listed.    COURSE & MEDICAL DECISION MAKING  I have reviewed any medical record information, laboratory studies and radiographic results as noted above.      Results for orders placed or performed during the hospital encounter of 11/09/21   CBC WITH DIFFERENTIAL   Result Value Ref Range    WBC 6.3 4.8 - 10.8 K/uL    RBC 4.29 (L) 4.70 - 6.10 M/uL    Hemoglobin 14.2 14.0 - 18.0 g/dL    Hematocrit 41.7 (L) 42.0 - 52.0 %    MCV 97.2 81.4 - 97.8 fL    MCH 33.1 (H) 27.0 - 33.0 pg    MCHC 34.1 33.7 - 35.3 g/dL    RDW 46.0 35.9 - 50.0 fL    Platelet Count 177 164 - 446 K/uL    MPV 9.6 9.0 - 12.9 fL    Neutrophils-Polys 39.60 (L) 44.00 - 72.00 %    Lymphocytes 44.20 (H) 22.00 - 41.00 %    Monocytes 12.00 0.00 - 13.40 %    Eosinophils 2.50 0.00 - 6.90 %    Basophils 1.40 0.00 - 1.80 %    Immature Granulocytes 0.30 0.00 - 0.90 %    Nucleated RBC 0.00 /100 WBC    Neutrophils (Absolute) 2.51 1.82 - 7.42 K/uL    Lymphs (Absolute) 2.80 1.00 - 4.80 K/uL    Monos (Absolute) 0.76 0.00 - 0.85 K/uL    Eos (Absolute) 0.16 0.00 - 0.51 K/uL    Baso (Absolute) 0.09 0.00 - 0.12 K/uL    Immature Granulocytes (abs) 0.02 0.00 - 0.11 K/uL    NRBC (Absolute) 0.00 K/uL   COMP METABOLIC  PANEL   Result Value Ref Range    Sodium 141 135 - 145 mmol/L    Potassium 3.7 3.6 - 5.5 mmol/L    Chloride 99 96 - 112 mmol/L    Co2 23 20 - 33 mmol/L    Anion Gap 19.0 (H) 7.0 - 16.0    Glucose 102 (H) 65 - 99 mg/dL    Bun 6 (L) 8 - 22 mg/dL    Creatinine 0.96 0.50 - 1.40 mg/dL    Calcium 9.7 8.4 - 10.2 mg/dL    AST(SGOT) 144 (H) 12 - 45 U/L    ALT(SGPT) 101 (H) 2 - 50 U/L    Alkaline Phosphatase 96 30 - 99 U/L    Total Bilirubin 0.3 0.1 - 1.5 mg/dL    Albumin 4.6 3.2 - 4.9 g/dL    Total Protein 7.2 6.0 - 8.2 g/dL    Globulin 2.6 1.9 - 3.5 g/dL    A-G Ratio 1.8 g/dL   LIPASE   Result Value Ref Range    Lipase 64 (H) 7 - 58 U/L   URINALYSIS    Specimen: Blood   Result Value Ref Range    Color Yellow     Character Clear     Specific Gravity <=1.005 <1.035    Ph 7.0 5.0 - 8.0    Glucose Negative Negative mg/dL    Ketones Negative Negative mg/dL    Protein Negative Negative mg/dL    Bilirubin Negative Negative    Nitrite Negative Negative    Leukocyte Esterase Negative Negative    Occult Blood Negative Negative    Micro Urine Req see below    ESTIMATED GFR   Result Value Ref Range    GFR If African American >60 >60 mL/min/1.73 m 2    GFR If Non African American >60 >60 mL/min/1.73 m 2   EKG   Result Value Ref Range    Report       [facility]    Test Date:  2021  Pt Name:    SAMAN HERNANDEZ          Department: [facility]  MRN:        [id number]                      [facility]   Gender:     Male                         Technician: [id number]  :        1980                   Requested By:JHOANA GIVENS  Order #:    [id number]                    Jay MD:    Measurements  Intervals                                Axis  Rate:       80                           P:          49  VT:         143                          QRS:        79  QRSD:       83                           T:          63  QT:         363  QTc:        419    Interpretive Statements  Sinus  rhythm  Probable left atrial enlargement  Probable left ventricular hypertrophy  Baseline wander in lead(s) II,III,aVF,V3  No previous ECG available for comparison        Ekg;  nsr 80. No st elevation, no st depression, qtc 80.  No comparison ekg.     HYDRATION: Based on the patient's presentation of Dehydration the patient was given IV fluids. IV Hydration was used because oral hydration was not adequate alone. Upon recheck following hydration, the patient was improved.    6:35 PM  The pt is nontoxic appearing, comfortable, and afebrile. At this time, the pt will be treated with a rally bag and ativan to avoid withdrawal.  He will follow up and will return for another issue or any concerns.       If you have had any blood pressure issues while here in the emergency department, please see your doctor for a further evaluation or work up.    Differential diagnoses include but not limited to: etoh abuse, mi, ptx,     This patient presents with near syncope/etoh abuse .  At this time, I have counseled the patient/family regarding their medications, pain control, and follow up.  They will continue their medications, if any, as prescribed.  They will return immediately for any worsening symptoms and/or any other medical concerns.  They will see their doctor, or contact the doctor provided, in 1-2 days for follow up.       FINAL IMPRESSION  Near syncope  Alcohol abuse    Electronically signed by: Joss May D.O., 11/9/2021 6:25 PM         Quality 130: Documentation Of Current Medications In The Medical Record: Current Medications Documented Quality 431: Preventive Care And Screening: Unhealthy Alcohol Use - Screening: Patient not identified as an unhealthy alcohol user when screened for unhealthy alcohol use using a systematic screening method Quality 47: Advance Care Plan: Advance Care Planning discussed and documented; advance care plan or surrogate decision maker documented in the medical record. Detail Level: Detailed Quality 226: Preventive Care And Screening: Tobacco Use: Screening And Cessation Intervention: Patient screened for tobacco use and is an ex/non-smoker No abnormalities